# Patient Record
Sex: MALE | Race: WHITE | Employment: OTHER | RURAL
[De-identification: names, ages, dates, MRNs, and addresses within clinical notes are randomized per-mention and may not be internally consistent; named-entity substitution may affect disease eponyms.]

---

## 2017-01-31 ENCOUNTER — OFFICE VISIT (OUTPATIENT)
Dept: FAMILY MEDICINE CLINIC | Age: 62
End: 2017-01-31

## 2017-01-31 VITALS
DIASTOLIC BLOOD PRESSURE: 90 MMHG | TEMPERATURE: 98.2 F | HEART RATE: 88 BPM | RESPIRATION RATE: 22 BRPM | HEIGHT: 73 IN | BODY MASS INDEX: 41.75 KG/M2 | SYSTOLIC BLOOD PRESSURE: 149 MMHG | WEIGHT: 315 LBS | OXYGEN SATURATION: 91 %

## 2017-01-31 DIAGNOSIS — J01.41 ACUTE RECURRENT PANSINUSITIS: ICD-10-CM

## 2017-01-31 DIAGNOSIS — E78.5 HYPERLIPIDEMIA, UNSPECIFIED HYPERLIPIDEMIA TYPE: ICD-10-CM

## 2017-01-31 DIAGNOSIS — J02.9 PHARYNGITIS, UNSPECIFIED ETIOLOGY: ICD-10-CM

## 2017-01-31 DIAGNOSIS — I10 ESSENTIAL HYPERTENSION: ICD-10-CM

## 2017-01-31 DIAGNOSIS — E66.01 MORBID OBESITY DUE TO EXCESS CALORIES (HCC): ICD-10-CM

## 2017-01-31 DIAGNOSIS — R05.9 COUGH: ICD-10-CM

## 2017-01-31 RX ORDER — CODEINE PHOSPHATE AND GUAIFENESIN 10; 100 MG/5ML; MG/5ML
5 SOLUTION ORAL
Qty: 180 ML | Refills: 0 | Status: SHIPPED | OUTPATIENT
Start: 2017-01-31 | End: 2017-10-27

## 2017-01-31 RX ORDER — VERAPAMIL HYDROCHLORIDE 180 MG/1
180 TABLET, EXTENDED RELEASE ORAL
Qty: 90 TAB | Refills: 1 | Status: SHIPPED | OUTPATIENT
Start: 2017-01-31 | End: 2017-04-20 | Stop reason: SDUPTHER

## 2017-01-31 RX ORDER — AMOXICILLIN AND CLAVULANATE POTASSIUM 875; 125 MG/1; MG/1
1 TABLET, FILM COATED ORAL 2 TIMES DAILY
Qty: 20 TAB | Refills: 0 | Status: SHIPPED | OUTPATIENT
Start: 2017-01-31 | End: 2017-02-10

## 2017-01-31 RX ORDER — FLUTICASONE PROPIONATE 50 MCG
2 SPRAY, SUSPENSION (ML) NASAL DAILY
Qty: 1 BOTTLE | Refills: 3 | Status: SHIPPED | OUTPATIENT
Start: 2017-01-31 | End: 2018-01-25

## 2017-01-31 RX ORDER — METOPROLOL TARTRATE 50 MG/1
50 TABLET ORAL 2 TIMES DAILY
Qty: 180 TAB | Refills: 2 | Status: SHIPPED | OUTPATIENT
Start: 2017-01-31 | End: 2018-02-27 | Stop reason: SDUPTHER

## 2017-01-31 RX ORDER — LISINOPRIL AND HYDROCHLOROTHIAZIDE 20; 25 MG/1; MG/1
1 TABLET ORAL DAILY
Qty: 90 TAB | Refills: 1 | Status: SHIPPED | OUTPATIENT
Start: 2017-01-31 | End: 2017-10-27 | Stop reason: SDUPTHER

## 2017-01-31 RX ORDER — METFORMIN HYDROCHLORIDE 1000 MG/1
1000 TABLET ORAL 2 TIMES DAILY WITH MEALS
Qty: 180 TAB | Refills: 3 | Status: SHIPPED | OUTPATIENT
Start: 2017-01-31 | End: 2018-05-22 | Stop reason: SDUPTHER

## 2017-01-31 NOTE — PROGRESS NOTES
Jovanny Traore is an 63 y.o. male who presents with chief concern of IDDM, sick visit. URI:  For 1 month, he has had productive yellow cough with blood tinged. Bi-modal in nature after improvement 2 weeks ago. He admits pleurisy with cough, on occasion.   + Fevers (subjective with +chills), +nasal congestion, pain and sinus pressure. Doneta Citlaly Nyquil with some relief. Wife is also sick. Diabetes:  Last seen 1 year ago, he has uncontrolled IDDM. He states he takes all of his medications, but one of his BP medications is . The refills were for daily x 180 tabswith 2 refills. This may have  prior to completion due to large volume of medications. No denies any mention of hypoglycemia or symptoms. He needs HCTZ/lisinopril, metoprolol, metformin, verapamil. Diet: Little of everything, Eats fried foods. Admits some baked foods. Fruits and vegetables (1-2 per day) . Exercise: Work as a , welding. No Regular exercise. Detailed ROS below. Review of Systems, positives are bolded, strike through if denied. GEN:    /Fever/Chills/CV:      Pulm:    Cough/ExpectorationAbd/GI:   Psych:    Neuro:     ENT:    Sinus pain,   Endocrine:     :      MSK:      Skin:    Lower Ext:           Current and past medical information:  I personally reviewed and included updated list below.     Past Medical History   Diagnosis Date    Hypercholesterolemia     Hypertension        Allergies   Allergen Reactions    Cyclobenzaprine Other (comments)     Leg cramps    Percocet [Oxycodone-Acetaminophen] Nausea Only       Past Surgical History   Procedure Laterality Date    Hx orthopaedic  2004     rt shoulder surgery    Hx orthopaedic       fx lft arm       Social History     Social History    Marital status:      Spouse name: N/A    Number of children: N/A    Years of education: N/A     Social History Main Topics    Smoking status: Former Smoker     Quit date: 1992    Smokeless tobacco: Never Used    Alcohol use No    Drug use: No    Sexual activity: Yes     Partners: Female     Other Topics Concern    None     Social History Narrative           Physical Exam, Abnormal/pertinent findings bolded,     Visit Vitals    /90    Pulse 88    Temp 98.2 °F (36.8 °C) (Oral)    Resp 22    Ht 6' 1\" (1.854 m)    Wt 317 lb (143.8 kg)    SpO2 91%    BMI 41.82 kg/m2          GEN:    Alert and Oriented, No acute distress  Psych:  Mood appropriate, No pressured speech, linear thoughts  CV:    Regular Rhythm, S1 and S2 audible, no MRG, no palpable thrills  Pulm:    CTA B/L, no wheezes/rubs, Cough with deep inspiration. GI/Abd:   Non-tender to palpation, normal bowel sounds x4, no masses, (-) involuntary or voluntary guarding  Neuro:   Lucid, No focal deficits  ENT:    Tender and boggy frontal and maxillary sinuses, nasal turbinates inflamed with drainage. Throat erythematous, voice hoarse, TM mildly retracted and hyperemic, injected bilaterally. EOMI, Non-icteric sclera, MMM  Neck:   Trachea midline, no lymphadenopathy  :    No suprapubic tenderness  MSK:  Normal gait, FROM in all four extremities  Skin:   No visible Rash, Ecchymosis, or Excoriations  Lower Ext: No edema, No tenderness to palpation, No palpable cords        Assessment/PLAN    1. Uncontrolled type 2 diabetes mellitus without complication, with long-term current use of insulin ([address]  - he is taking his NPH over the counter BID 23 units without checking glucose. He denies any lows. His last A1c was >9 but he has not been seen in ~1 year. He endorses taking his medications as prescribed but due to self pay has not been able to make visits. He is not on any diet and is active at work but not on a routine exercise plan.   These were discussed in detail.   - CBC W/O DIFF  - METABOLIC PANEL, COMPREHENSIVE  - HEMOGLOBIN A1C WITH EAG  - LIPID PANEL  - TSH 3RD GENERATION  - MICROALBUMIN, UR, RAND W/ MICROALBUMIN/CREA RATIO  - metFORMIN (GLUCOPHAGE) 1,000 mg tablet; Take 1 Tab by mouth two (2) times daily (with meals). Dispense: 180 Tab; Refill: 3    2. Acute recurrent pansinusitis  - Bi-modal improvement and worsening. Fevers and chills with jaw pain. Considering his diabetes with treat with Abx.   - fluticasone (FLONASE) 50 mcg/actuation nasal spray; 2 Sprays by Both Nostrils route daily. Dispense: 1 Bottle; Refill: 3  - amoxicillin-clavulanate (AUGMENTIN) 875-125 mg per tablet; Take 1 Tab by mouth two (2) times a day for 10 days. Dispense: 20 Tab; Refill: 0    3. Morbid obesity due to excess calories (HCC)  Body mass index is 41.82 kg/(m^2). Discussion of diet and exercise. 4. Essential hypertension  Not at goal, refill his medications. Goal <140/90.  - verapamil ER (CALAN-SR) 180 mg CR tablet; Take 1 Tab by mouth nightly. Dispense: 90 Tab; Refill: 1  - lisinopril-hydroCHLOROthiazide (PRINZIDE, ZESTORETIC) 20-25 mg per tablet; Take 1 Tab by mouth daily. Dispense: 90 Tab; Refill: 1  - metoprolol tartrate (LOPRESSOR) 50 mg tablet; Take 1 Tab by mouth two (2) times a day. Dispense: 180 Tab; Refill: 2    5. Hyperlipidemia, unspecified hyperlipidemia type  - Follow up lipid panel. 6. Pharyngitis, unspecified etiology  - URI and sinusitis likely multi factorial of post nasal, sinus infection.   - guaiFENesin-codeine (ROBITUSSIN AC) 100-10 mg/5 mL solution; Take 5 mL by mouth three (3) times daily as needed for Cough. Max Daily Amount: 15 mL. Indications: COUGH  Dispense: 180 mL; Refill: 0    7. Cough  As above.   - guaiFENesin-codeine (ROBITUSSIN AC) 100-10 mg/5 mL solution; Take 5 mL by mouth three (3) times daily as needed for Cough. Max Daily Amount: 15 mL. Indications: COUGH  Dispense: 180 mL; Refill: 0    8. Uncontrolled type 2 diabetes mellitus with other specified complication, with long-term current use of insulin ([address]  As above. - metFORMIN (GLUCOPHAGE) 1,000 mg tablet;  Take 1 Tab by mouth two (2) times daily (with meals). Dispense: 180 Tab; Refill: 3      Body mass index is 41.82 kg/(m^2). I have reviewed/discussed the above normal BMI with the patient and spouse. I have recommended the following interventions: lifestyle education regarding diet, nutrition/feeding management and prescribed activity/exercise education . The plan is as follows: I have counseled this patient on diet and exercise regimens. .      Follow-up Disposition:  Return in about 3 months (around 4/30/2017). For next visit follow up diabetes and weight management    Plan of care:  Discussed diagnoses in detail with patient. Medication risks/benefits/side effects discussed with patient. All of the patient's questions were addressed. The patient understands and agrees with our plan of care. The patient knows to call back if they are unsure of or forget any changes we discussed today or if the symptoms change. The patient received an After-Visit Summary which contains VS, orders, medication list and allergy list. This can be used as a \"mini-medical record\" should they have to seek medical care while out of town. Warden Quinn DO  Resident note, PGY-3  Patient discussed with Precept Physician, Rae Burleson MD    No future appointments. Current Medications after this visit[de-identified]       Current Outpatient Prescriptions   Medication Sig    fluticasone (FLONASE) 50 mcg/actuation nasal spray 2 Sprays by Both Nostrils route daily.  amoxicillin-clavulanate (AUGMENTIN) 875-125 mg per tablet Take 1 Tab by mouth two (2) times a day for 10 days.  guaiFENesin-codeine (ROBITUSSIN AC) 100-10 mg/5 mL solution Take 5 mL by mouth three (3) times daily as needed for Cough. Max Daily Amount: 15 mL. Indications: COUGH    verapamil ER (CALAN-SR) 180 mg CR tablet Take 1 Tab by mouth nightly.  lisinopril-hydroCHLOROthiazide (PRINZIDE, ZESTORETIC) 20-25 mg per tablet Take 1 Tab by mouth daily.     metFORMIN (GLUCOPHAGE) 1,000 mg tablet Take 1 Tab by mouth two (2) times daily (with meals).  metoprolol tartrate (LOPRESSOR) 50 mg tablet Take 1 Tab by mouth two (2) times a day.  pravastatin (PRAVACHOL) 40 mg tablet Take 1 Tab by mouth nightly.  insulin NPH (NOVOLIN N, HUMULIN N) 100 unit/mL injection Inject 23 units twice a day for DM-2 (250.00)    insulin syringe-needle U-100 Dispense microfine 1 mL syringes with 30 gauge needle. Inject 23 units NPH twice a day for DM-2 (250.00)    Insulin Needles, Disposable, 31 X 5/16 \" ndle Inject hs for DM-2 250.00    glucose blood VI test strips (BLOOD GLUCOSE TEST) strip Test TID for DM-2 treated with insulin. (Patient taking differently: Test once a day for DM-2 treated with insulin.)    Lancets misc Test TID for DM-2 treated with insulin (250.00)    triamcinolone (KENALOG) 0.1 % lotion Apply  to affected area two (2) times daily as needed for Skin Irritation or Itching. use thin layer    omega-3 fatty acids-vitamin e (FISH OIL) 1,000 mg cap Take 2 Caps by mouth two (2) times a day.  nicotinic acid (NIACIN) 500 mg tablet Take 1 Tab by mouth Daily (before breakfast).  aspirin (ASPIRIN) 325 mg tablet Take 1 Tab by mouth daily. No current facility-administered medications for this visit.

## 2017-01-31 NOTE — PATIENT INSTRUCTIONS

## 2017-01-31 NOTE — MR AVS SNAPSHOT
Visit Information Date & Time Provider Department Dept. Phone Encounter #  
 1/31/2017  9:30 AM Rikki Galindo, [address] [phone] [id number] Upcoming Health Maintenance Date Due Pneumococcal 19-64 Medium Risk (1 of 1 - PPSV23) 6/7/1976 DTaP/Tdap/Td series (1 - Tdap) 1/22/2011 FOBT Q 1 YEAR AGE 50-75 9/26/2015 FOOT EXAM Q1 6/24/2016 HEMOGLOBIN A1C Q6M 8/19/2016 MICROALBUMIN Q1 2/19/2017 LIPID PANEL Q1 2/19/2017 EYE EXAM RETINAL OR DILATED Q1 12/28/2017 Allergies as of 1/31/2017  Review Complete On: 1/31/2017 By: Dipak Rosa Severity Noted Reaction Type Reactions Cyclobenzaprine  09/26/2014   Side Effect Other (comments) Leg cramps Percocet [Oxycodone-acetaminophen]  02/25/2010    Nausea Only Current Immunizations  Reviewed on 12/22/2010 Name Date Influenza Vaccine Split 12/22/2010 TD Vaccine 1/21/2011 Not reviewed this visit You Were Diagnosed With   
  
 Codes Comments Uncontrolled type 2 diabetes mellitus without complication, with long-term current use of insulin ([facility]    -  Primary ICD-10-CM: E11.65, Z79.4 ICD-9-CM: 250.02, V58.67 Acute recurrent pansinusitis     ICD-10-CM: J01.41 
ICD-9-CM: 461.8 Morbid obesity due to excess calories (HCC)     ICD-10-CM: E66.01 
ICD-9-CM: 278.01 Essential hypertension     ICD-10-CM: I10 
ICD-9-CM: 401.9 Hyperlipidemia, unspecified hyperlipidemia type     ICD-10-CM: E78.5 ICD-9-CM: 272.4 Pharyngitis, unspecified etiology     ICD-10-CM: J02.9 ICD-9-CM: 423 Cough     ICD-10-CM: R05 ICD-9-CM: 786.2 Uncontrolled type 2 diabetes mellitus with other specified complication, with long-term current use of insulin (HCC)     ICD-10-CM: E11.69, E11.65, Z79.4 Vitals BP Pulse Temp Resp Height(growth percentile) Weight(growth percentile) 149/90 88 98.2 °F (36.8 °C) (Oral) 22 6' 1\" (1.854 m) 317 lb (143.8 kg) SpO2 BMI Smoking Status 91% 41.82 kg/m2 Former Smoker Vitals History BMI and BSA Data Body Mass Index Body Surface Area  
 41.82 kg/m 2 2.72 m 2 Preferred Pharmacy Pharmacy Name Phone [facility] [address] [address] [phone] Your Updated Medication List  
  
   
This list is accurate as of: 1/31/17 10:15 AM.  Always use your most recent med list.  
  
  
  
  
 amoxicillin-clavulanate 875-125 mg per tablet Commonly known as:  AUGMENTIN Take 1 Tab by mouth two (2) times a day for 10 days. aspirin 325 mg tablet Commonly known as:  ASPIRIN Take 1 Tab by mouth daily. fluticasone 50 mcg/actuation nasal spray Commonly known as:  [place] 2 Sprays by Both Nostrils route daily. glucose blood VI test strips strip Commonly known as:  blood glucose test  
Test TID for DM-2 treated with insulin. guaiFENesin-codeine 100-10 mg/5 mL solution Commonly known as:  ROBITUSSIN AC Take 5 mL by mouth three (3) times daily as needed for Cough. Max Daily Amount: 15 mL. Indications: COUGH Insulin Needles (Disposable) 31 gauge x 5/16\" Ndle Inject hs for DM-2 250.00  
  
 insulin  unit/mL injection Commonly known as:  Nanine Able Inject 23 units twice a day for DM-2 (250.00) insulin syringe-needle U-100 Dispense microfine 1 mL syringes with 30 gauge needle. Inject 23 units NPH twice a day for DM-2 (250.00) Lancets Misc Test TID for DM-2 treated with insulin (250.00)  
  
 lisinopril-hydroCHLOROthiazide 20-25 mg per tablet Commonly known as:  Tim Jimenez Take 1 Tab by mouth daily. metFORMIN 1,000 mg tablet Commonly known as:  GLUCOPHAGE Take 1 Tab by mouth two (2) times daily (with meals). metoprolol tartrate 50 mg tablet Commonly known as:  LOPRESSOR Take 1 Tab by mouth two (2) times a day. nicotinic acid 500 mg tablet Commonly known as:  NIACIN Take 1 Tab by mouth Daily (before breakfast). omega-3 fatty acids-vitamin e 1,000 mg Cap Commonly known as:  FISH OIL Take 2 Caps by mouth two (2) times a day. pravastatin 40 mg tablet Commonly known as:  PRAVACHOL Take 1 Tab by mouth nightly. triamcinolone 0.1 % lotion Commonly known as:  KENALOG Apply  to affected area two (2) times daily as needed for Skin Irritation or Itching. use thin layer  
  
 verapamil  mg CR tablet Commonly known as:  CALAN-SR Take 1 Tab by mouth nightly. Prescriptions Printed Refills  
 guaiFENesin-codeine (ROBITUSSIN AC) 100-10 mg/5 mL solution 0 Sig: Take 5 mL by mouth three (3) times daily as needed for Cough. Max Daily Amount: 15 mL. Indications: COUGH Class: Print Route: Oral  
  
Prescriptions Sent to Pharmacy Refills  
 fluticasone (FLONASE) 50 mcg/actuation nasal spray 3 Si Sprays by Both Nostrils route daily. Class: Normal  
 Pharmacy: [place] [address] Ph #: [phone] Route: Both Nostrils  
 amoxicillin-clavulanate (AUGMENTIN) 875-125 mg per tablet 0 Sig: Take 1 Tab by mouth two (2) times a day for 10 days. Class: Normal  
 Pharmacy: [place] [address] Ph #: [phone] Route: Oral  
 verapamil ER (CALAN-SR) 180 mg CR tablet 1 Sig: Take 1 Tab by mouth nightly. Class: Normal  
 Pharmacy: [place] [address] Ph #: [phone] Route: Oral  
 lisinopril-hydroCHLOROthiazide (PRINZIDE, ZESTORETIC) 20-25 mg per tablet 1 Sig: Take 1 Tab by mouth daily. Class: Normal  
 Pharmacy: [place] [address] Ph #: [phone] Route: Oral  
 metFORMIN (GLUCOPHAGE) 1,000 mg tablet 3 Sig: Take 1 Tab by mouth two (2) times daily (with meals).   
 Class: Normal  
 Pharmacy: [id number] [place] [address] Ph #: [phone] Route: Oral  
 metoprolol tartrate (LOPRESSOR) 50 mg tablet 2 Sig: Take 1 Tab by mouth two (2) times a day. Class: Normal  
 Pharmacy: [id number] [place] [address] Ph #: [phone] Route: Oral  
  
We Performed the Following CBC W/O DIFF [29251 CPT(R)] HEMOGLOBIN A1C WITH EAG [23851 CPT(R)] LIPID PANEL [88253 CPT(R)] METABOLIC PANEL, COMPREHENSIVE [51275 CPT(R)] MICROALBUMIN, UR, RAND W/ MICROALBUMIN/CREA RATIO [id number] CPT(R)] TSH 3RD GENERATION [14969 CPT(R)] Patient Instructions Nutrition Tips for Diabetes: After Your Visit Your Care Instructions A healthy diet is important to manage diabetes. It helps you lose weight (if you need to) and keep it off. It gives you the nutrition and energy your body needs and helps prevent heart disease. But a diet for diabetes does not mean that you have to eat special foods. You can eat what your family eats, including occasional sweets and other favorites. But you do have to pay attention to how often you eat and how much you eat of certain foods. The right plan for you will give you meals that help you keep your blood sugar at healthy levels. Try to eat a variety of foods and to spread carbohydrate throughout the day. Carbohydrate raises blood sugar higher and more quickly than any other nutrient does. Carbohydrate is found in sugar, breads and cereals, fruit, starchy vegetables such as potatoes and corn, and milk and yogurt. You may want to work with a dietitian or diabetes educator to help you plan meals and snacks. A dietitian or diabetes educator also can help you lose weight if that is one of your goals. The following tips can help you enjoy your meals and stay healthy. Follow-up care is a key part of your treatment and safety.  Be sure to make and go to all appointments, and call your doctor if you are having problems. Its also a good idea to know your test results and keep a list of the medicines you take. How can you care for yourself at home? · Learn which foods have carbohydrate and how much carbohydrate to eat. A dietitian or diabetes educator can help you learn to keep track of how much carbohydrate you eat. · Spread carbohydrate throughout the day. Eat some carbohydrate at all meals, but do not eat too much at any one time. · Plan meals to include food from all the food groups. These are the food groups and some example portion sizes: ¨ Grains: 1 slice of bread (1 ounce), ½ cup of cooked cereal, and 1/3 cup of cooked pasta or rice. These have about 15 grams of carbohydrate in a serving. Choose whole grains such as whole wheat bread or crackers, oatmeal, and brown rice more often than refined grains. ¨ Fruit: 1 small fresh fruit, such as an apple or orange; ½ of a banana; ½ cup of chopped, cooked, or canned fruit; ½ cup of fruit juice; 1 cup of melon or raspberries; and 2 tablespoons of dried fruit. These have about 15 grams of carbohydrate in a serving. ¨ Dairy: 1 cup of nonfat or low-fat milk and 2/3 cup of plain yogurt. These have about 15 grams of carbohydrate in a serving. ¨ Protein foods: Beef, chicken, turkey, fish, eggs, tofu, cheese, cottage cheese, and peanut butter. A serving size of meat is 3 ounces, which is about the size of a deck of cards. Examples of meat substitute serving sizes (equal to 1 ounce of meat) are 1/4 cup of cottage cheese, 1 egg, 1 tablespoon of peanut butter, and ½ cup of tofu. These have very little or no carbohydrate per serving. ¨ Vegetables: Starchy vegetables such as ½ cup of cooked dried beans, peas, potatoes, or corn have about 15 grams of carbohydrate.  Nonstarchy vegetables have very little carbohydrate, such as 1 cup of raw leafy vegetables (such as spinach), ½ cup of other vegetables (cooked or chopped), and 3/4 cup of vegetable juice. · Use the plate format to plan meals. It is a good, quick way to make sure that you have a balanced meal. It also helps you spread carbohydrate throughout the day. You divide your plate by types of foods. Put vegetables on half the plate, meat or meat substitutes on one-quarter of the plate, and a grain or starchy vegetable (such as brown rice or a potato) in the final quarter of the plate. To this you can add a small piece of fruit and 1 cup of milk or yogurt, depending on how much carbohydrate you are supposed to eat at a meal. 
· Talk to your dietitian or diabetes educator about ways to add limited amounts of sweets into your meal plan. You can eat these foods now and then, as long as you include the amount of carbohydrate they have in your daily carbohydrate allowance. · If you drink alcohol, limit it to no more than 1 drink a day for women and 2 drinks a day for men. If you are pregnant, no amount of alcohol is known to be safe. · Protein, fat, and fiber do not raise blood sugar as much as carbohydrate does. If you eat a lot of these nutrients in a meal, your blood sugar will rise more slowly than it would otherwise. · Limit saturated fats, such as those from meat and dairy products. Try to replace it with monounsaturated fat, such as olive oil. This is a healthier choice because people who have diabetes are at higher-than-average risk of heart disease. But use a modest amount of olive oil. A tablespoon of olive oil has 14 grams of fat and 120 calories. · Exercise lowers blood sugar. If you take insulin by shots or pump, you can use less than you would if you were not exercising. Keep in mind that timing matters. If you exercise within 1 hour after a meal, your body may need less insulin for that meal than it would if you exercised 3 hours after the meal. Test your blood sugar to find out how exercise affects your need for insulin. · Exercise on most days of the week. Aim for at least 30 minutes. Exercise helps you stay at a healthy weight and helps your body use insulin. Walking is an easy way to get exercise. Gradually increase the amount you walk every day. You also may want to swim, bike, or do other activities. When you eat out · Learn to estimate the serving sizes of foods that have carbohydrate. If you measure food at home, it will be easier to estimate the amount in a serving of restaurant food. · If the meal you order has too much carbohydrate (such as potatoes, corn, or baked beans), ask to have a low-carbohydrate food instead. Ask for a salad or green vegetables. · If you use insulin, check your blood sugar before and after eating out to help you plan how much to eat in the future. · If you eat more carbohydrate at a meal than you had planned, take a walk or do other exercise. This will help lower your blood sugar. Where can you learn more? Go to [employer] Enter O654 in the search box to learn more about \"Nutrition Tips for Diabetes: After Your Visit. \"  
© 6585-1849 [employer]wise, Incorporated. Care instructions adapted under license by Riky Hirsch (which disclaims liability or warranty for this information). This care instruction is for use with your licensed healthcare professional. If you have questions about a medical condition or this instruction, always ask your healthcare professional. [address] any warranty or liability for your use of this information. Content Version: 02.5.622191; Current as of: June 4, 2014 Introducing [address]! Riky Hirsch introduces [email or web address] patient portal. Now you can access parts of your medical record, email your doctor's office, and request medication refills online. 1. In your internet browser, go to [email or web address] 2. Click on the First Time User? Click Here link in the Sign In box.  You will see the New Member Sign Up page. 3. Enter your [employer] Access Code exactly as it appears below. You will not need to use this code after youve completed the sign-up process. If you do not sign up before the expiration date, you must request a new code. · [employer] Access Code: [email or web address] Expires: 5/1/2017  9:13 AM 
 
4. Enter the last four digits of your Social Security Number (xxxx) and Date of Birth (mm/dd/yyyy) as indicated and click Submit. You will be taken to the next sign-up page. 5. Create a [employer] ID. This will be your [employer] login ID and cannot be changed, so think of one that is secure and easy to remember. 6. Create a [employer] password. You can change your password at any time. 7. Enter your Password Reset Question and Answer. This can be used at a later time if you forget your password. 8. Enter your e-mail address. You will receive e-mail notification when new information is available in [address]. 9. Click Sign Up. You can now view and download portions of your medical record. 10. Click the Download Summary menu link to download a portable copy of your medical information. If you have questions, please visit the Frequently Asked Questions section of the [employer] website. Remember, [employer] is NOT to be used for urgent needs. For medical emergencies, dial 911. Now available from your iPhone and Android! Please provide this summary of care documentation to your next provider. Your primary care clinician is listed as Cayla Pena. If you have any questions after today's visit, please call [phone].

## 2017-01-31 NOTE — PROGRESS NOTES
Reviewed record in preparation for visit and have necessary documentation    Body mass index is 41.82 kg/(m^2).     Health Maintenance Due   Topic Date Due    Pneumococcal 19-64 Medium Risk (1 of 1 - PPSV23) 06/07/1976    DTaP/Tdap/Td series (1 - Tdap) 01/22/2011    FOBT Q 1 YEAR AGE 50-75  09/26/2015    FOOT EXAM Q1  06/24/2016    HEMOGLOBIN A1C Q6M  08/19/2016    MICROALBUMIN Q1  02/19/2017    LIPID PANEL Q1  02/19/2017

## 2017-02-01 LAB
ALBUMIN SERPL-MCNC: 3.8 G/DL (ref 3.5–5.5)
ALBUMIN/CREAT UR: 78.6 MG/G CREAT (ref 0–30)
ALBUMIN/GLOB SERPL: 1.2 {RATIO} (ref 1.1–2.5)
ALP SERPL-CCNC: 86 IU/L (ref 39–117)
ALT SERPL-CCNC: 27 IU/L (ref 0–44)
AST SERPL-CCNC: 22 IU/L (ref 0–40)
BILIRUB SERPL-MCNC: 0.2 MG/DL (ref 0–1.2)
BUN SERPL-MCNC: 14 MG/DL (ref 6–24)
BUN/CREAT SERPL: 14 (ref 9–20)
CALCIUM SERPL-MCNC: 9.9 MG/DL (ref 8.7–10.2)
CHLORIDE SERPL-SCNC: 98 MMOL/L (ref 96–106)
CHOLEST SERPL-MCNC: 163 MG/DL (ref 100–199)
CO2 SERPL-SCNC: 23 MMOL/L (ref 18–29)
CREAT SERPL-MCNC: 1 MG/DL (ref 0.76–1.27)
CREAT UR-MCNC: 193.7 MG/DL
ERYTHROCYTE [DISTWIDTH] IN BLOOD BY AUTOMATED COUNT: 13.4 % (ref 12.3–15.4)
EST. AVERAGE GLUCOSE BLD GHB EST-MCNC: 286 MG/DL
GLOBULIN SER CALC-MCNC: 3.1 G/DL (ref 1.5–4.5)
GLUCOSE SERPL-MCNC: 222 MG/DL (ref 65–99)
HBA1C MFR BLD: 11.6 % (ref 4.8–5.6)
HCT VFR BLD AUTO: 45.8 % (ref 37.5–51)
HDLC SERPL-MCNC: 41 MG/DL
HGB BLD-MCNC: 15.6 G/DL (ref 12.6–17.7)
LDLC SERPL CALC-MCNC: 94 MG/DL (ref 0–99)
MCH RBC QN AUTO: 30.4 PG (ref 26.6–33)
MCHC RBC AUTO-ENTMCNC: 34.1 G/DL (ref 31.5–35.7)
MCV RBC AUTO: 89 FL (ref 79–97)
MICROALBUMIN UR-MCNC: 152.2 UG/ML
PLATELET # BLD AUTO: 387 X10E3/UL (ref 150–379)
POTASSIUM SERPL-SCNC: 4.3 MMOL/L (ref 3.5–5.2)
PROT SERPL-MCNC: 6.9 G/DL (ref 6–8.5)
RBC # BLD AUTO: 5.14 X10E6/UL (ref 4.14–5.8)
SODIUM SERPL-SCNC: 138 MMOL/L (ref 134–144)
TRIGL SERPL-MCNC: 142 MG/DL (ref 0–149)
TSH SERPL DL<=0.005 MIU/L-ACNC: 0.58 UIU/ML (ref 0.45–4.5)
VLDLC SERPL CALC-MCNC: 28 MG/DL (ref 5–40)
WBC # BLD AUTO: 11.1 X10E3/UL (ref 3.4–10.8)

## 2017-02-07 NOTE — PROGRESS NOTES
CBC stable  CMP without renal or liver damage, elevated fasting glucose. Hemoglobin A1c worse but stable for him. Taking 23 units NPH over the counter BID, not checking POC glucoses. Will advise to increase to 30 mg BID. Microalbumin showing signs of early kidney disease. Lipid panel is stable. TSH stable.

## 2017-04-20 DIAGNOSIS — I10 ESSENTIAL HYPERTENSION: ICD-10-CM

## 2017-04-21 RX ORDER — VERAPAMIL HYDROCHLORIDE 180 MG/1
180 TABLET, EXTENDED RELEASE ORAL
Qty: 90 TAB | Refills: 1 | Status: SHIPPED | OUTPATIENT
Start: 2017-04-21 | End: 2018-01-25 | Stop reason: SDUPTHER

## 2017-10-18 DIAGNOSIS — I10 ESSENTIAL HYPERTENSION: ICD-10-CM

## 2017-10-19 RX ORDER — LISINOPRIL AND HYDROCHLOROTHIAZIDE 20; 25 MG/1; MG/1
1 TABLET ORAL DAILY
Qty: 90 TAB | Refills: 1 | OUTPATIENT
Start: 2017-10-19

## 2017-10-27 ENCOUNTER — OFFICE VISIT (OUTPATIENT)
Dept: FAMILY MEDICINE CLINIC | Age: 62
End: 2017-10-27

## 2017-10-27 VITALS
HEIGHT: 73 IN | BODY MASS INDEX: 41.75 KG/M2 | TEMPERATURE: 97.7 F | RESPIRATION RATE: 20 BRPM | WEIGHT: 315 LBS | OXYGEN SATURATION: 92 % | HEART RATE: 76 BPM | DIASTOLIC BLOOD PRESSURE: 90 MMHG | SYSTOLIC BLOOD PRESSURE: 142 MMHG

## 2017-10-27 DIAGNOSIS — L08.9 SKIN INFECTION, BACTERIAL: Primary | ICD-10-CM

## 2017-10-27 DIAGNOSIS — B96.89 SKIN INFECTION, BACTERIAL: Primary | ICD-10-CM

## 2017-10-27 DIAGNOSIS — E78.5 HYPERLIPIDEMIA, UNSPECIFIED HYPERLIPIDEMIA TYPE: ICD-10-CM

## 2017-10-27 DIAGNOSIS — I10 ESSENTIAL HYPERTENSION: ICD-10-CM

## 2017-10-27 LAB — HBA1C MFR BLD HPLC: 9.9 %

## 2017-10-27 RX ORDER — CEPHALEXIN 500 MG/1
500 CAPSULE ORAL 4 TIMES DAILY
Qty: 40 CAP | Refills: 0 | Status: SHIPPED | OUTPATIENT
Start: 2017-10-27 | End: 2017-11-06

## 2017-10-27 RX ORDER — LISINOPRIL AND HYDROCHLOROTHIAZIDE 20; 25 MG/1; MG/1
1 TABLET ORAL DAILY
Qty: 90 TAB | Refills: 0 | Status: SHIPPED | OUTPATIENT
Start: 2017-10-27 | End: 2018-01-22 | Stop reason: SDUPTHER

## 2017-10-27 NOTE — MR AVS SNAPSHOT
Visit Information Date & Time Provider Department Dept. Phone Encounter #  
 10/27/2017  8:40 AM Diane Frank MD  [facility] [place] [id number] Upcoming Health Maintenance Date Due Pneumococcal 19-64 Medium Risk (1 of 1 - PPSV23) 6/7/1976 DTaP/Tdap/Td series (1 - Tdap) 1/22/2011 FOBT Q 1 YEAR AGE 50-75 9/26/2015 FOOT EXAM Q1 6/24/2016 ZOSTER VACCINE AGE 60> 4/7/2017 HEMOGLOBIN A1C Q6M 7/31/2017 EYE EXAM RETINAL OR DILATED Q1 12/28/2017 MICROALBUMIN Q1 1/31/2018 LIPID PANEL Q1 1/31/2018 Allergies as of 10/27/2017  Review Complete On: 10/27/2017 By: Jean-Paul Camarena Severity Noted Reaction Type Reactions Cyclobenzaprine  09/26/2014   Side Effect Other (comments) Leg cramps Percocet [Oxycodone-acetaminophen]  02/25/2010    Nausea Only Current Immunizations  Reviewed on 12/22/2010 Name Date Influenza Vaccine Split 12/22/2010 TD Vaccine 1/21/2011 Not reviewed this visit You Were Diagnosed With   
  
 Codes Comments Skin infection, bacterial    -  Primary ICD-10-CM: L08.9, B96.89 
ICD-9-CM: 686.9, 041.9 Uncontrolled type 2 diabetes mellitus without complication, with long-term current use of insulin (HCC)     ICD-10-CM: E11.65, Z79.4 ICD-9-CM: 250.02, V58.67 Essential hypertension     ICD-10-CM: I10 
ICD-9-CM: 401.9 Hyperlipidemia, unspecified hyperlipidemia type     ICD-10-CM: E78.5 ICD-9-CM: 272.4 Vitals BP Pulse Temp Resp Height(growth percentile) Weight(growth percentile) 142/90 (BP 1 Location: Right arm, BP Patient Position: Sitting) 76 97.7 °F (36.5 °C) (Oral) 20 6' 1\" (1.854 m) 325 lb (147.4 kg) SpO2 BMI Smoking Status 92% 42.88 kg/m2 Former Smoker Vitals History BMI and BSA Data Body Mass Index Body Surface Area  
 42.88 kg/m 2 2.76 m 2 Preferred Pharmacy Pharmacy Name Phone [facility] [address] [address] [phone] Your Updated Medication List  
  
   
This list is accurate as of: 10/27/17  9:36 AM.  Always use your most recent med list.  
  
  
  
  
 aspirin 325 mg tablet Commonly known as:  ASPIRIN Take 1 Tab by mouth daily. cephALEXin 500 mg capsule Commonly known as:  Addie Officer Take 1 Cap by mouth four (4) times daily for 10 days. fluticasone 50 mcg/actuation nasal spray Commonly known as:  Ginna Reges 2 Sprays by Both Nostrils route daily. glucose blood VI test strips strip Commonly known as:  blood glucose test  
Test TID for DM-2 treated with insulin. Insulin Needles (Disposable) 31 gauge x 5/16\" Ndle Inject hs for DM-2 250.00  
  
 insulin  unit/mL injection Commonly known as:  Bronwyn Gains Inject 23 units twice a day for DM-2 (250.00) insulin syringe-needle U-100 Dispense microfine 1 mL syringes with 30 gauge needle. Inject 23 units NPH twice a day for DM-2 (250.00) Lancets Misc Test TID for DM-2 treated with insulin (250.00)  
  
 lisinopril-hydroCHLOROthiazide 20-25 mg per tablet Commonly known as:  Katie Mcardle Take 1 Tab by mouth daily. metFORMIN 1,000 mg tablet Commonly known as:  GLUCOPHAGE Take 1 Tab by mouth two (2) times daily (with meals). metoprolol tartrate 50 mg tablet Commonly known as:  LOPRESSOR Take 1 Tab by mouth two (2) times a day. omega-3 fatty acids-vitamin e 1,000 mg Cap Commonly known as:  FISH OIL Take 2 Caps by mouth two (2) times a day. pravastatin 40 mg tablet Commonly known as:  PRAVACHOL Take 1 Tab by mouth nightly. triamcinolone 0.1 % lotion Commonly known as:  KENALOG Apply  to affected area two (2) times daily as needed for Skin Irritation or Itching. use thin layer  
  
 verapamil  mg CR tablet Commonly known as:  CALAN-SR Take 1 Tab by mouth nightly. Prescriptions Sent to Pharmacy Refills  
 lisinopril-hydroCHLOROthiazide (PRINZIDE, ZESTORETIC) 20-25 mg per tablet 0 Sig: Take 1 Tab by mouth daily. Class: Normal  
 Pharmacy: [place] [address] Ph #: [phone] Route: Oral  
 cephALEXin (KEFLEX) 500 mg capsule 0 Sig: Take 1 Cap by mouth four (4) times daily for 10 days. Class: Normal  
 Pharmacy: [place] [address] Ph #: [phone] Route: Oral  
  
We Performed the Following AMB POC HEMOGLOBIN A1C [31999 CPT(R)] LIPID PANEL [65311 CPT(R)] METABOLIC PANEL, COMPREHENSIVE [00389 CPT(R)] TSH 3RD GENERATION [54258 CPT(R)] Patient Instructions Diabetes Foot Health: Care Instructions Your Care Instructions When you have diabetes, your feet need extra care and attention. Diabetes can damage the nerve endings and blood vessels in your feet, making you less likely to notice when your feet are injured. Diabetes also limits your body's ability to fight infection and get blood to areas that need it. If you get a minor foot injury, it could become an ulcer or a serious infection. With good foot care, you can prevent most of these problems. Caring for your feet can be quick and easy. Most of the care can be done when you are bathing or getting ready for bed. Follow-up care is a key part of your treatment and safety. Be sure to make and go to all appointments, and call your doctor if you are having problems. It's also a good idea to know your test results and keep a list of the medicines you take. How can you care for yourself at home? · Keep your blood sugar close to normal by watching what and how much you eat, monitoring blood sugar, taking medicines if prescribed, and getting regular exercise. · Do not smoke.  Smoking affects blood flow and can make foot problems worse. If you need help quitting, talk to your doctor about stop-smoking programs and medicines. These can increase your chances of quitting for good. · Eat a diet that is low in fats. High fat intake can cause fat to build up in your blood vessels and decrease blood flow. · Inspect your feet daily for blisters, cuts, cracks, or sores. If you cannot see well, use a mirror or have someone help you. · Take care of your feet: 
[facility] your feet every day. Use warm (not hot) water. Check the water temperature with your wrists or other part of your body, not your feet. ¨ Dry your feet well. Pat them dry. Do not rub the skin on your feet too hard. Dry well between your toes. If the skin on your feet stays moist, bacteria or a fungus can grow, which can lead to infection. ¨ Keep your skin soft. Use moisturizing skin cream to keep the skin on your feet soft and prevent calluses and cracks. But do not put the cream between your toes, and stop using any cream that causes a rash. ¨ Clean underneath your toenails carefully. Do not use a sharp object to clean underneath your toenails. Use the blunt end of a nail file or other rounded tool. ¨ Trim and file your toenails straight across to prevent ingrown toenails. Use a nail clipper, not scissors. Use an emery board to smooth the edges. · Change socks daily. Socks without seams are best, because seams often rub the feet. You can find socks for people with diabetes from specialty catalogs. · Look inside your shoes every day for things like gravel or torn linings, which could cause blisters or sores. · Buy shoes that fit well: 
¨ Look for shoes that have plenty of space around the toes. This helps prevent bunions and blisters. ¨ Try on shoes while wearing the kind of socks you will usually wear with the shoes. ¨ Avoid plastic shoes. They may rub your feet and cause blisters.  Good shoes should be made of materials that are flexible and breathable, such as leather or cloth. ¨ Break in new shoes slowly by wearing them for no more than an hour a day for several days. Take extra time to check your feet for red areas, blisters, or other problems after you wear new shoes. · Do not go barefoot. Do not wear sandals, and do not wear shoes with very thin soles. Thin soles are easy to puncture. They also do not protect your feet from hot pavement or cold weather. · Have your doctor check your feet during each visit. If you have a foot problem, see your doctor. Do not try to treat an early foot problem at home. Home remedies or treatments that you can buy without a prescription (such as corn removers) can be harmful. · Always get early treatment for foot problems. A minor irritation can lead to a major problem if not properly cared for early. When should you call for help? Call your doctor now or seek immediate medical care if: 
? · You have a foot sore, an ulcer or break in the skin that is not healing after 4 days, bleeding corns or calluses, or an ingrown toenail. ? · You have blue or black areas, which can mean bruising or blood flow problems. ? · You have peeling skin or tiny blisters between your toes or cracking or oozing of the skin. ? · You have a fever for more than 24 hours and a foot sore. ? · You have new numbness or tingling in your feet that does not go away after you move your feet or change positions. ? · You have unexplained or unusual swelling of the foot or ankle. ? Watch closely for changes in your health, and be sure to contact your doctor if: 
? · You cannot do proper foot care. Where can you learn more? Go to http://live-carmen.info/. Enter A739 in the search box to learn more about \"Diabetes Foot Health: Care Instructions. \" Current as of: March 13, 2017 Content Version: 11.4 © 0554-1673 [employer].  Care instructions adapted under license by [address] (which disclaims liability or warranty for this information). If you have questions about a medical condition or this instruction, always ask your healthcare professional. [address] any warranty or liability for your use of this information. Introducing [facility]! [facility] introduces SupportLocal patient portal. Now you can access parts of your medical record, email your doctor's office, and request medication refills online. 1. In your internet browser, go to [email or web address] 2. Click on the First Time User? Click Here link in the Sign In box. You will see the New Member Sign Up page. 3. Enter your SupportLocal Access Code exactly as it appears below. You will not need to use this code after youve completed the sign-up process. If you do not sign up before the expiration date, you must request a new code. · SupportLocal Access Code: [email or web address] Expires: 1/25/2018  9:35 AM 
 
4. Enter the last four digits of your Social Security Number (xxxx) and Date of Birth (mm/dd/yyyy) as indicated and click Submit. You will be taken to the next sign-up page. 5. Create a SupportLocal ID. This will be your SupportLocal login ID and cannot be changed, so think of one that is secure and easy to remember. 6. Create a SupportLocal password. You can change your password at any time. 7. Enter your Password Reset Question and Answer. This can be used at a later time if you forget your password. 8. Enter your e-mail address. You will receive e-mail notification when new information is available in [address] 9. Click Sign Up. You can now view and download portions of your medical record. 10. Click the Download Summary menu link to download a portable copy of your medical information. If you have questions, please visit the Frequently Asked Questions section of the SupportLocal website.  Remember, SupportLocal is NOT to be used for urgent needs. For medical emergencies, dial 911. Now available from your iPhone and Android! Please provide this summary of care documentation to your next provider. If you have any questions after today's visit, please call [phone].

## 2017-10-27 NOTE — PATIENT INSTRUCTIONS
Diabetes Foot Health: Care Instructions  Your Care Instructions    When you have diabetes, your feet need extra care and attention. Diabetes can damage the nerve endings and blood vessels in your feet, making you less likely to notice when your feet are injured. Diabetes also limits your body's ability to fight infection and get blood to areas that need it. If you get a minor foot injury, it could become an ulcer or a serious infection. With good foot care, you can prevent most of these problems. Caring for your feet can be quick and easy. Most of the care can be done when you are bathing or getting ready for bed. Follow-up care is a key part of your treatment and safety. Be sure to make and go to all appointments, and call your doctor if you are having problems. It's also a good idea to know your test results and keep a list of the medicines you take. How can you care for yourself at home? · Keep your blood sugar close to normal by watching what and how much you eat, monitoring blood sugar, taking medicines if prescribed, and getting regular exercise. · Do not smoke. Smoking affects blood flow and can make foot problems worse. If you need help quitting, talk to your doctor about stop-smoking programs and medicines. These can increase your chances of quitting for good. · Eat a diet that is low in fats. High fat intake can cause fat to build up in your blood vessels and decrease blood flow. · Inspect your feet daily for blisters, cuts, cracks, or sores. If you cannot see well, use a mirror or have someone help you. · Take care of your feet:  [facility] your feet every day. Use warm (not hot) water. Check the water temperature with your wrists or other part of your body, not your feet. ¨ Dry your feet well. Pat them dry. Do not rub the skin on your feet too hard. Dry well between your toes. If the skin on your feet stays moist, bacteria or a fungus can grow, which can lead to infection.   ¨ Keep your skin soft. Use moisturizing skin cream to keep the skin on your feet soft and prevent calluses and cracks. But do not put the cream between your toes, and stop using any cream that causes a rash. ¨ Clean underneath your toenails carefully. Do not use a sharp object to clean underneath your toenails. Use the blunt end of a nail file or other rounded tool. ¨ Trim and file your toenails straight across to prevent ingrown toenails. Use a nail clipper, not scissors. Use an emery board to smooth the edges. · Change socks daily. Socks without seams are best, because seams often rub the feet. You can find socks for people with diabetes from specialty catalogs. · Look inside your shoes every day for things like gravel or torn linings, which could cause blisters or sores. · Buy shoes that fit well:  ¨ Look for shoes that have plenty of space around the toes. This helps prevent bunions and blisters. ¨ Try on shoes while wearing the kind of socks you will usually wear with the shoes. ¨ Avoid plastic shoes. They may rub your feet and cause blisters. Good shoes should be made of materials that are flexible and breathable, such as leather or cloth. ¨ Break in new shoes slowly by wearing them for no more than an hour a day for several days. Take extra time to check your feet for red areas, blisters, or other problems after you wear new shoes. · Do not go barefoot. Do not wear sandals, and do not wear shoes with very thin soles. Thin soles are easy to puncture. They also do not protect your feet from hot pavement or cold weather. · Have your doctor check your feet during each visit. If you have a foot problem, see your doctor. Do not try to treat an early foot problem at home. Home remedies or treatments that you can buy without a prescription (such as corn removers) can be harmful. · Always get early treatment for foot problems. A minor irritation can lead to a major problem if not properly cared for early.   When should you call for help? Call your doctor now or seek immediate medical care if:  ? · You have a foot sore, an ulcer or break in the skin that is not healing after 4 days, bleeding corns or calluses, or an ingrown toenail. ? · You have blue or black areas, which can mean bruising or blood flow problems. ? · You have peeling skin or tiny blisters between your toes or cracking or oozing of the skin. ? · You have a fever for more than 24 hours and a foot sore. ? · You have new numbness or tingling in your feet that does not go away after you move your feet or change positions. ? · You have unexplained or unusual swelling of the foot or ankle. ? Watch closely for changes in your health, and be sure to contact your doctor if:  ? · You cannot do proper foot care. Where can you learn more? Go to http://live-carmen.info/. Enter A739 in the search box to learn more about \"Diabetes Foot Health: Care Instructions. \"  Current as of: March 13, 2017  Content Version: 11.4  © 9112-9083 [employer]. Care instructions adapted under license by [employer] (which disclaims liability or warranty for this information). If you have questions about a medical condition or this instruction, always ask your healthcare professional. [address] any warranty or liability for your use of this information.

## 2017-10-27 NOTE — PROGRESS NOTES
Reviewed record in preparation for visit and have necessary documentation  Pt did not bring medication to office visit for review    Goals that were addressed and/or need to be completed during or after this appointment include   Health Maintenance Due   Topic Date Due    Pneumococcal 19-64 Medium Risk (1 of 1 - PPSV23) 06/07/1976    DTaP/Tdap/Td series (1 - Tdap) 01/22/2011    FOBT Q 1 YEAR AGE 50-75  09/26/2015    FOOT EXAM Q1  06/24/2016    ZOSTER VACCINE AGE 60>  04/07/2017    HEMOGLOBIN A1C Q6M  07/31/2017

## 2017-10-28 LAB
ALBUMIN SERPL-MCNC: 3.9 G/DL (ref 3.6–4.8)
ALBUMIN/GLOB SERPL: 1.4 {RATIO} (ref 1.2–2.2)
ALP SERPL-CCNC: 59 IU/L (ref 39–117)
ALT SERPL-CCNC: 35 IU/L (ref 0–44)
AST SERPL-CCNC: 17 IU/L (ref 0–40)
BILIRUB SERPL-MCNC: 0.3 MG/DL (ref 0–1.2)
BUN SERPL-MCNC: 17 MG/DL (ref 8–27)
BUN/CREAT SERPL: 17 (ref 10–24)
CALCIUM SERPL-MCNC: 9.5 MG/DL (ref 8.6–10.2)
CHLORIDE SERPL-SCNC: 100 MMOL/L (ref 96–106)
CHOLEST SERPL-MCNC: 176 MG/DL (ref 100–199)
CO2 SERPL-SCNC: 25 MMOL/L (ref 18–29)
CREAT SERPL-MCNC: 1.03 MG/DL (ref 0.76–1.27)
GFR SERPLBLD CREATININE-BSD FMLA CKD-EPI: 79 ML/MIN/1.73
GFR SERPLBLD CREATININE-BSD FMLA CKD-EPI: 91 ML/MIN/1.73
GLOBULIN SER CALC-MCNC: 2.7 G/DL (ref 1.5–4.5)
GLUCOSE SERPL-MCNC: 219 MG/DL (ref 65–99)
HDLC SERPL-MCNC: 44 MG/DL
LDLC SERPL CALC-MCNC: 101 MG/DL (ref 0–99)
POTASSIUM SERPL-SCNC: 4.8 MMOL/L (ref 3.5–5.2)
PROT SERPL-MCNC: 6.6 G/DL (ref 6–8.5)
SODIUM SERPL-SCNC: 141 MMOL/L (ref 134–144)
TRIGL SERPL-MCNC: 155 MG/DL (ref 0–149)
TSH SERPL DL<=0.005 MIU/L-ACNC: 0.58 UIU/ML (ref 0.45–4.5)
VLDLC SERPL CALC-MCNC: 31 MG/DL (ref 5–40)

## 2017-10-29 NOTE — PROGRESS NOTES
Patient: Angie Carter MRN: [id number]  SSN: [id number]    YOB: 1955  Age: 63 y.o. Sex: male        Subjective:     Chief Complaint   Patient presents with    Skin Problem     knot on right side of back       HPI: he is a 63y.o. year old male new to me who presents for painful lesion on right mid-back. Concerned about insect bite. Patient with hx of uncontrolled T2D, HLD, HTN and morbid obesity. Patient sans health insurance. He is past due for lab work. He does need medication refills. Patient denies HA, dizziness, SOB, CP, abdominal pain, dysuria, myalgias or arthralgias. Encounter Diagnoses   Name Primary?  Skin infection, bacterial Yes    Uncontrolled type 2 diabetes mellitus without complication, with long-term current use of insulin (HCC)     Essential hypertension     Hyperlipidemia, unspecified hyperlipidemia type      Diabetes: This patient is being treating under a comprehensive plan of care for diabetes. Overall the patient feels well with good energy level. Insulin dependence: no   Pertinent Labs:     Lab Results   Component Value Date/Time    Hemoglobin A1c 11.6 01/31/2017 10:20 AM    Hemoglobin A1c (POC) 9.9 10/27/2017 09:46 AM         Body mass index is 42.88 kg/(m^2). Lab Results   Component Value Date/Time    LDL, calculated 101 10/27/2017 12:39 PM           Wt Readings from Last 3 Encounters:   10/27/17 325 lb (147.4 kg)   01/31/17 317 lb (143.8 kg)   02/19/16 326 lb (147.9 kg)        History   Smoking Status    Former Smoker    Quit date: 6/1/1992   Smokeless Tobacco    Never Used        Medications, diet and exercise as means of diabetic control with a goal of an A1C of less than 7.0% discussed. Diabetic foot care and annual eye exam discussed as well. Check blood sugars while fasting just before breakfast on most days and occasionally before dinner. Write down readings in a diabetic log book and bring them to the next visit.     Call the office for fasting sugars over 200 or below 75 on two or more occasions. Call immediately if having symptoms of high sugar (frequent urination, always thirsty) or low sugar (dizzy, lethargic, sweaty, nauseated, headache). Our overall goal is to reduce or eliminate the long term consequences of poorly controlled diabetes. Patient expresses understanding and agreement with our plan of care. Hypertension:  The patient reports:  taking medications as instructed, no medication side effects noted, no TIA's, no chest pain on exertion, no dyspnea on exertion, no swelling of ankles. Lifestyle modification/social history: nonsmoker     BP Readings from Last 3 Encounters:   10/27/17 142/90   01/31/17 149/90   02/19/16 131/79     Lab Results   Component Value Date/Time    Sodium 141 10/27/2017 12:39 PM    Potassium 4.8 10/27/2017 12:39 PM    Chloride 100 10/27/2017 12:39 PM    CO2 25 10/27/2017 12:39 PM    Anion gap 11 06/28/2010 11:03 AM    Glucose 219 10/27/2017 12:39 PM    BUN 17 10/27/2017 12:39 PM    Creatinine 1.03 10/27/2017 12:39 PM    BUN/Creatinine ratio 17 10/27/2017 12:39 PM    GFR est AA 91 10/27/2017 12:39 PM    GFR est non-AA 79 10/27/2017 12:39 PM    Calcium 9.5 10/27/2017 12:39 PM     Patient advised to log blood pressures at home weekly and bring to next visit. Call office as soon as possible if BP's over 140/90 on multiple occasions or with symptoms of dizziness, chest pain, shortness of breath, headache or ankle swelling. Our goal is to normalize the blood pressure to decrease the risks of strokes and heart attacks. The patient is in agreement with the plan. Current and past medical information:    Current Medications after this visit[de-identified]     Current Outpatient Prescriptions   Medication Sig    lisinopril-hydroCHLOROthiazide (PRINZIDE, ZESTORETIC) 20-25 mg per tablet Take 1 Tab by mouth daily.  cephALEXin (KEFLEX) 500 mg capsule Take 1 Cap by mouth four (4) times daily for 10 days.     verapamil ER (CALAN-SR) 180 mg CR tablet Take 1 Tab by mouth nightly.  metFORMIN (GLUCOPHAGE) 1,000 mg tablet Take 1 Tab by mouth two (2) times daily (with meals).  metoprolol tartrate (LOPRESSOR) 50 mg tablet Take 1 Tab by mouth two (2) times a day.  pravastatin (PRAVACHOL) 40 mg tablet Take 1 Tab by mouth nightly.  insulin NPH (NOVOLIN N, HUMULIN N) 100 unit/mL injection Inject 23 units twice a day for DM-2 (250.00)    insulin syringe-needle U-100 Dispense microfine 1 mL syringes with 30 gauge needle. Inject 23 units NPH twice a day for DM-2 (250.00)    Insulin Needles, Disposable, 31 X 5/16 \" ndle Inject hs for DM-2 250.00    glucose blood VI test strips (BLOOD GLUCOSE TEST) strip Test TID for DM-2 treated with insulin. (Patient taking differently: Test once a day for DM-2 treated with insulin.)    Lancets misc Test TID for DM-2 treated with insulin (250.00)    triamcinolone (KENALOG) 0.1 % lotion Apply  to affected area two (2) times daily as needed for Skin Irritation or Itching. use thin layer    omega-3 fatty acids-vitamin e (FISH OIL) 1,000 mg cap Take 2 Caps by mouth two (2) times a day.  aspirin (ASPIRIN) 325 mg tablet Take 1 Tab by mouth daily.  fluticasone (FLONASE) 50 mcg/actuation nasal spray 2 Sprays by Both Nostrils route daily. No current facility-administered medications for this visit.         Patient Active Problem List    Diagnosis Date Noted    Diabetes type 2, uncontrolled ([facility] [address] 05/28/2014    Lumbar compression fracture ([facility] [address] 06/15/2011    HTN (hypertension) 06/18/2010    Hyperlipidemia 06/18/2010    Obesity 16/98/0746    Dysmetabolic syndrome X 74/93/0736       Past Medical History:   Diagnosis Date    Hypercholesterolemia     Hypertension        Allergies   Allergen Reactions    Cyclobenzaprine Other (comments)     Leg cramps    Percocet [Oxycodone-Acetaminophen] Nausea Only       Past Surgical History:   Procedure Laterality Date    HX ORTHOPAEDIC  2004    rt shoulder surgery    HX ORTHOPAEDIC      fx lft arm       Social History     Social History    Marital status:      Spouse name: N/A    Number of children: N/A    Years of education: N/A     Social History Main Topics    Smoking status: Former Smoker     Quit date: 6/1/1992    Smokeless tobacco: Never Used    Alcohol use No    Drug use: No    Sexual activity: Yes     Partners: Female     Other Topics Concern    None     Social History Narrative         Objective:     Review of Systems:  Constitutional: Negative for fatigue or malaise  Derm: see HPI  HEENT: Negative for acute hearing or vision changes  Cardiovascular: Negative for dizziness, chest pain or palpitations  Respiratory: Negative for cough, wheezing or SOB  Gastreintestinal: Negative for nausea or abdominal pain  Genital/urinary: Negative for dysuria or voiding dysfunction  Muscoloskeletal: Negative for acute myalgias or arthralgias   Neurological: Negative for headache, weakness or paresthesia  Psychological: Negative for depression or anxiety      Vitals:    10/27/17 0834 10/27/17 0934   BP: (!) 164/95 142/90   Pulse: 76    Resp: 20    Temp: 97.7 °F (36.5 °C)    TempSrc: Oral    SpO2: 92%    Weight: 325 lb (147.4 kg)    Height: 6' 1\" (1.854 m)       Body mass index is 42.88 kg/(m^2).     Physical Exam:  Constitutional: Obese, in no acute distress  Skin: 2.0 cm area of erythema and edema right mid-back, TTP  Head: normocephalic, atraumatic  Eyes: sclera clear, EOMI  Neck: normal range of motion  Cardiovascular: normal S1, S2, regular rate and rhythm  Respiratory: clear to auscultation bilaterally with symmetrical effort  Abdomen: soft, BS normal  Extremities: full range of motion  Neurology: normal strength and sensation  Psych: active, alert and oriented, affect appropriate       Health Maintenance Due   Topic Date Due    Pneumococcal 19-64 Medium Risk (1 of 1 - PPSV23) 06/07/1976    DTaP/Tdap/Td series (1 - Tdap) 01/22/2011    FOBT Q 1 YEAR AGE 50-75  09/26/2015    FOOT EXAM Q1  06/24/2016    ZOSTER VACCINE AGE 60>  04/07/2017       Risk, benefits and potential costs of recommended health maintenance discussed. Patient expressed understanding and declined at this time. Assessment and orders:       ICD-10-CM ICD-9-CM    1. Skin infection, bacterial L08.9 686.9 cephALEXin (KEFLEX) 500 mg capsule    B96.89 041.9 COLLECTION CAPILLARY BLOOD SPECIMEN   2. Uncontrolled type 2 diabetes mellitus without complication, with long-term current use of insulin (HCC) E11.65 250.02 LIPID PANEL    O13.1 G46.57 METABOLIC PANEL, COMPREHENSIVE      TSH 3RD GENERATION      AMB POC HEMOGLOBIN A1C      COLLECTION CAPILLARY BLOOD SPECIMEN   3. Essential hypertension I10 401.9 lisinopril-hydroCHLOROthiazide (PRINZIDE, ZESTORETIC) 20-25 mg per tablet      METABOLIC PANEL, COMPREHENSIVE      TSH 3RD GENERATION   4. Hyperlipidemia, unspecified hyperlipidemia type E78.5 272.4 LIPID PANEL      METABOLIC PANEL, COMPREHENSIVE         Plan of care:  Diagnoses were discussed in detail with patient. Medication risks/benefits/side effects discussed with patient. All of the patient's questions were addressed and answered to apparent satisfaction. The patient understands and agrees with our plan of care. The patient knows to call back if they have questions about the plan of care or if symptoms change. The patient received an After-Visit Summary which contains VS, diagnoses, orders, allergy and medication lists. Patient Care Team:  Nai Gill RN as Ambulatory Care Navigator [facility])    Follow-up Disposition:  Return in about 3 months (around 1/27/2018), or if symptoms worsen or fail to improve.     Future Appointments  Date Time Provider Shay Carlos   1/26/2018 8:00 AM Walter Whitfield MD [facility] SCHED       Signed By: Jeferson Berrios MD     October 29, 2017

## 2017-11-27 DIAGNOSIS — E78.5 HYPERLIPIDEMIA, UNSPECIFIED HYPERLIPIDEMIA TYPE: ICD-10-CM

## 2017-11-29 RX ORDER — PRAVASTATIN SODIUM 40 MG/1
40 TABLET ORAL
Qty: 90 TAB | Refills: 0 | Status: SHIPPED | OUTPATIENT
Start: 2017-11-29 | End: 2018-01-25 | Stop reason: SDUPTHER

## 2018-01-22 DIAGNOSIS — I10 ESSENTIAL HYPERTENSION: ICD-10-CM

## 2018-01-24 RX ORDER — LISINOPRIL AND HYDROCHLOROTHIAZIDE 20; 25 MG/1; MG/1
TABLET ORAL
Qty: 90 TAB | Refills: 0 | Status: SHIPPED | OUTPATIENT
Start: 2018-01-24 | End: 2018-01-25 | Stop reason: SDUPTHER

## 2018-01-25 ENCOUNTER — OFFICE VISIT (OUTPATIENT)
Dept: FAMILY MEDICINE CLINIC | Age: 63
End: 2018-01-25

## 2018-01-25 VITALS
HEART RATE: 72 BPM | WEIGHT: 315 LBS | SYSTOLIC BLOOD PRESSURE: 160 MMHG | DIASTOLIC BLOOD PRESSURE: 93 MMHG | HEIGHT: 73 IN | RESPIRATION RATE: 18 BRPM | OXYGEN SATURATION: 96 % | TEMPERATURE: 98.3 F | BODY MASS INDEX: 41.75 KG/M2

## 2018-01-25 DIAGNOSIS — I10 ESSENTIAL HYPERTENSION: ICD-10-CM

## 2018-01-25 DIAGNOSIS — Z12.11 SCREENING FOR MALIGNANT NEOPLASM OF COLON: ICD-10-CM

## 2018-01-25 DIAGNOSIS — E66.01 OBESITY, MORBID (HCC): ICD-10-CM

## 2018-01-25 DIAGNOSIS — E78.5 HYPERLIPIDEMIA, UNSPECIFIED HYPERLIPIDEMIA TYPE: ICD-10-CM

## 2018-01-25 PROBLEM — E11.21 TYPE 2 DIABETES MELLITUS WITH NEPHROPATHY (HCC): Status: RESOLVED | Noted: 2018-01-25 | Resolved: 2018-01-25

## 2018-01-25 PROBLEM — E11.21 TYPE 2 DIABETES MELLITUS WITH NEPHROPATHY (HCC): Status: ACTIVE | Noted: 2018-01-25

## 2018-01-25 RX ORDER — GLUCOSAMINE/CHONDR SU A SOD 167-133 MG
500 CAPSULE ORAL
COMMUNITY

## 2018-01-25 RX ORDER — PRAVASTATIN SODIUM 40 MG/1
40 TABLET ORAL
Qty: 90 TAB | Refills: 1 | Status: SHIPPED | OUTPATIENT
Start: 2018-01-25 | End: 2018-10-08 | Stop reason: SDUPTHER

## 2018-01-25 RX ORDER — VERAPAMIL HYDROCHLORIDE 180 MG/1
180 TABLET, EXTENDED RELEASE ORAL
Qty: 90 TAB | Refills: 1 | Status: SHIPPED | OUTPATIENT
Start: 2018-01-25 | End: 2018-10-08 | Stop reason: SDUPTHER

## 2018-01-25 RX ORDER — LISINOPRIL AND HYDROCHLOROTHIAZIDE 20; 25 MG/1; MG/1
1 TABLET ORAL DAILY
Qty: 90 TAB | Refills: 1 | Status: SHIPPED | OUTPATIENT
Start: 2018-01-25 | End: 2018-10-08 | Stop reason: SDUPTHER

## 2018-01-25 NOTE — PROGRESS NOTES
Lola Saxena  63 y.o. male  1955  [address] [facility]  [id number]     [id number]: Progress Note       Encounter Date: 1/25/2018    Chief Complaint   Patient presents with    Diabetes    Cholesterol Problem    Hypertension       Patient was accompanied by patient  History of Present Illness   Lola Saxena is a 63 y.o. male who presents to clinic today for:    Diabetes Follow up: Uncontrolled   Overall the patient feels he is well   Diabetic Consultants:Endocrinologist - N/A   Therapy:oral agent (monotherapy): metformin (generic)  insulin injections: NPH: 30 units BID   Medication compliance:Good   Frequency of home glucose testing: Daily   Blood Sugar range at home: 160-180   Polyuria, polyphagia or polydipsia: NO   Low blood sugar symptoms: No   Dietary compliance: Good   On ASA: No:   Pertinent Labs:      Hemoglobin A1c   Date Value Ref Range Status   01/31/2017 11.6 (H) 4.8 - 5.6 % Final     Comment:              Pre-diabetes: 5.7 - 6.4           Diabetes: >6.4           Glycemic control for adults with diabetes: <7.0     02/19/2016 9.3 (H) 4.8 - 5.6 % Final     Comment:              Pre-diabetes: 5.7 - 6.4           Diabetes: >6.4           Glycemic control for adults with diabetes: <7.0     06/24/2015 9.8 (H) 4.8 - 5.6 % Final     Comment:              Increased risk for diabetes: 5.7 - 6.4           Diabetes: >6.4           Glycemic control for adults with diabetes: <7.0       Estimated average glucose   Date Value Ref Range Status   01/31/2017 286 mg/dL Final     Cholesterol, total   Date Value Ref Range Status   10/27/2017 176 100 - 199 mg/dL Final     HDL Cholesterol   Date Value Ref Range Status   10/27/2017 44 >39 mg/dL Final        Wt Readings from Last 3 Encounters:   01/25/18 324 lb (147 kg)   10/27/17 325 lb (147.4 kg)   01/31/17 317 lb (143.8 kg)        History   Smoking Status    Former Smoker    Quit date: 6/1/1992   Smokeless Tobacco    Never Used Hypertension: Uncontrolled   BP Readings from Last 3 Encounters:   01/25/18 (!) 160/93   10/27/17 142/90   01/31/17 149/90     The patient reports:  taking medications as instructed (patient had run out of lisinopril-HCTZ for 1 week prior), no medication side effects noted, no TIA's, no chest pain on exertion, no dyspnea on exertion, no swelling of ankles. Sodium   Date Value Ref Range Status   10/27/2017 141 134 - 144 mmol/L Final     Potassium   Date Value Ref Range Status   10/27/2017 4.8 3.5 - 5.2 mmol/L Final     Chloride   Date Value Ref Range Status   10/27/2017 100 96 - 106 mmol/L Final     Creatinine   Date Value Ref Range Status   10/27/2017 1.03 0.76 - 1.27 mg/dL Final   01/31/2017 1.00 0.76 - 1.27 mg/dL Final   02/19/2016 1.22 0.76 - 1.27 mg/dL Final     GFR est AA   Date Value Ref Range Status   10/27/2017 91 >59 mL/min/1.73 Final   01/31/2017 95 >59 mL/min/1.73 Final   02/19/2016 75 >59 mL/min/1.73 Final     GFR est non-AA   Date Value Ref Range Status   10/27/2017 79 >59 mL/min/1.73 Final   01/31/2017 82 >59 mL/min/1.73 Final   02/19/2016 65 >59 mL/min/1.73 Final       Our goal is to normalize the blood pressure to decrease the risks of strokes and heart attacks. The patient is in agreement with the plan. Hyperlipidemia:  Stable  Cardiovascular risks for him are: diabetic  hypertension  hyperlipidemia  obese. Currently he takes Pravachol (pravastatin)   Myalgias: No    Cholesterol, total   Date Value Ref Range Status   10/27/2017 176 100 - 199 mg/dL Final   01/31/2017 163 100 - 199 mg/dL Final   02/19/2016 186 100 - 199 mg/dL Final     HDL Cholesterol   Date Value Ref Range Status   10/27/2017 44 >39 mg/dL Final   01/31/2017 41 >39 mg/dL Final   02/19/2016 30 (L) >39 mg/dL Final     Comment:     According to ATP-III Guidelines, HDL-C >59 mg/dL is considered a  negative risk factor for CHD.        Triglyceride   Date Value Ref Range Status   10/27/2017 155 (H) 0 - 149 mg/dL Final 01/31/2017 142 0 - 149 mg/dL Final   02/19/2016 553 (HH) 0 - 149 mg/dL Final     ALT (SGPT)   Date Value Ref Range Status   10/27/2017 35 0 - 44 IU/L Final     AST (SGOT)   Date Value Ref Range Status   10/27/2017 17 0 - 40 IU/L Final     Alk. phosphatase   Date Value Ref Range Status   10/27/2017 59 39 - 117 IU/L Final       Wt Readings from Last 3 Encounters:   01/25/18 324 lb (147 kg)   10/27/17 325 lb (147.4 kg)   01/31/17 317 lb (143.8 kg)             Health Maintenance  Release signed for records    Health Maintenance Due   Topic Date Due    Pneumococcal 19-64 Medium Risk (1 of 1 - PPSV23) 06/07/1976    DTaP/Tdap/Td series (1 - Tdap) 01/22/2011    FOBT Q 1 YEAR AGE 50-75  09/26/2015    FOOT EXAM Q1  06/24/2016    ZOSTER VACCINE AGE 60>  04/07/2017    EYE EXAM RETINAL OR DILATED Q1  12/28/2017    MICROALBUMIN Q1  01/31/2018     Review of Systems   Review of Systems   Constitutional: Negative for chills, diaphoresis and fever. HENT: Negative for congestion, sinus pain and sore throat. Eyes: Negative for discharge and redness. Respiratory: Negative for cough, sputum production and shortness of breath. Cardiovascular: Negative for chest pain, palpitations and leg swelling. Gastrointestinal: Negative for abdominal pain, blood in stool, constipation, diarrhea, nausea and vomiting. Musculoskeletal: Negative for falls. Skin: Negative for itching and rash. Neurological: Negative for dizziness, focal weakness, seizures, loss of consciousness, weakness and headaches. Vitals/Objective:     Vitals:    01/25/18 0804 01/25/18 0826   BP: (!) 165/95 (!) 160/93   Pulse: 71 72   Resp: 18    Temp: 98.3 °F (36.8 °C)    TempSrc: Oral    SpO2: 96%    Weight: 324 lb (147 kg)    Height: 6' 1\" (1.854 m)      Body mass index is 42.75 kg/(m^2). Physical Exam   Constitutional: He is oriented to person, place, and time. He appears well-developed and well-nourished. No distress.    HENT:   Head: Normocephalic and atraumatic. Eyes: Right eye exhibits no discharge. Left eye exhibits no discharge. No scleral icterus. Neck: Normal range of motion. Neck supple. Carotid bruit is not present. Cardiovascular: Normal rate and regular rhythm. No murmur heard. Pulmonary/Chest: Effort normal and breath sounds normal. No respiratory distress. He has no wheezes. He has no rales. Musculoskeletal: He exhibits no edema or tenderness. Lymphadenopathy:     He has no cervical adenopathy. Neurological: He is alert and oriented to person, place, and time. Skin: Skin is warm and dry. No rash noted. He is not diaphoretic. No erythema. Diabetic foot exam:     Left foot:   Reflexes  present  Vibratory sensation  present   Proprioception  present  Sharp/dull discrimination  present  Filament test normal sensation with micro filament   Pulse DP: 2+   Pulse PT: 1+ (weak)    Deformities: normal toes without deformities  Right foot:   Reflexes  present  Vibratory sensation  present   Proprioception  present  Sharp/dull discrimination  present  Filament test normal sensation with micro filament   Pulse DP: 2+   Pulse PT: 1+ (weak)    Deformities:normal toes without deformities        No results found for this or any previous visit (from the past 24 hour(s)). Assessment and Plan:     Encounter Diagnoses     ICD-10-CM ICD-9-CM   1. Uncontrolled type 2 diabetes mellitus with diabetic nephropathy, with long-term current use of insulin (HCC) E11.21 250.42    E11.65 583.81    Z79.4 V58.67   2. Essential hypertension I10 401.9   3. Hyperlipidemia, unspecified hyperlipidemia type E78.5 272.4   4. Obesity, morbid ([facility] [address] E66.01 278.01   5. Screening for malignant neoplasm of colon Z12.11 V76.51       1. Uncontrolled type 2 diabetes mellitus with diabetic nephropathy, with long-term current use of insulin (HCC)  Last HbA1c was much worse though reports home BS much improved.  Will recheck A1c today.   - HEMOGLOBIN A1C WITH EAG  - MICROALBUMIN, UR, RAND W/ MICROALBUMIN/CREA RATIO  -  DIABETES FOOT EXAM  - insulin NPH (NOVOLIN N, HUMULIN N) 100 unit/mL injection; Inject 30 units twice a day for DM-2 (250.00)  Indications: type 2 diabetes mellitus  Dispense: 3 Vial; Refill: 3    2. Essential hypertension  Not at goal as patient has been without medication. He does not have a home monitor.   - lisinopril-hydroCHLOROthiazide (PRINZIDE, ZESTORETIC) 20-25 mg per tablet; Take 1 Tab by mouth daily. Dispense: 90 Tab; Refill: 1  - verapamil ER (CALAN-SR) 180 mg CR tablet; Take 1 Tab by mouth nightly. Dispense: 90 Tab; Refill: 1  - METABOLIC PANEL, BASIC    3. Hyperlipidemia, unspecified hyperlipidemia type  Tolerating well without issues. - pravastatin (PRAVACHOL) 40 mg tablet; Take 1 Tab by mouth nightly. Dispense: 90 Tab; Refill: 1  - LIPID PANEL    4. Obesity, morbid ([facility] [address]  Discussed the patient's BMI with him. The BMI follow up plan is as follows:     dietary management education, guidance, and counseling  encourage exercise  monitor weight  prescribed dietary intake    An After Visit Summary was printed and given to the patient. 5. Screening for malignant neoplasm of colon  - OCCULT BLOOD, IMMUNOASSAY (FIT)      I have discussed the diagnosis with the patient and the intended plan as seen in the above orders. he has expressed understanding. The patient has received an after-visit summary and questions were answered concerning future plans. I have discussed medication side effects and warnings with the patient as well. Electronically Signed: Reyes Blanks, MD     History/Allergies   Patients past medical, surgical and family histories were reviewed and updated.     Past Medical History:   Diagnosis Date    Hypercholesterolemia     Hypertension       Past Surgical History:   Procedure Laterality Date    HX ORTHOPAEDIC  2004    rt shoulder surgery    HX ORTHOPAEDIC      fx lft arm     Family History   Problem Relation Age of Onset    Diabetes Father     Hypertension Father      Social History     Social History    Marital status:      Spouse name: N/A    Number of children: N/A    Years of education: N/A     Occupational History    Not on file. Social History Main Topics    Smoking status: Former Smoker     Quit date: 6/1/1992    Smokeless tobacco: Never Used    Alcohol use No    Drug use: No    Sexual activity: Yes     Partners: Female     Other Topics Concern    Not on file     Social History Narrative         Allergies   Allergen Reactions    Cyclobenzaprine Other (comments)     Leg cramps    Percocet [Oxycodone-Acetaminophen] Nausea Only       Disposition     Follow-up Disposition:  Return in about 3 months (around 4/25/2018) for Routine. .    No future appointments. Current Medications after this visit     Current Outpatient Prescriptions   Medication Sig    nicotinic acid (NIACIN) 500 mg tablet Take 500 mg by mouth Daily (before breakfast).  lisinopril-hydroCHLOROthiazide (PRINZIDE, ZESTORETIC) 20-25 mg per tablet Take 1 Tab by mouth daily.  verapamil ER (CALAN-SR) 180 mg CR tablet Take 1 Tab by mouth nightly.  pravastatin (PRAVACHOL) 40 mg tablet Take 1 Tab by mouth nightly.  insulin NPH (NOVOLIN N, HUMULIN N) 100 unit/mL injection Inject 30 units twice a day for DM-2 (250.00)  Indications: type 2 diabetes mellitus    metFORMIN (GLUCOPHAGE) 1,000 mg tablet Take 1 Tab by mouth two (2) times daily (with meals).  metoprolol tartrate (LOPRESSOR) 50 mg tablet Take 1 Tab by mouth two (2) times a day.  insulin syringe-needle U-100 Dispense microfine 1 mL syringes with 30 gauge needle. Inject 23 units NPH twice a day for DM-2 (250.00)    Insulin Needles, Disposable, 31 X 5/16 \" ndle Inject hs for DM-2 250.00    glucose blood VI test strips (BLOOD GLUCOSE TEST) strip Test TID for DM-2 treated with insulin.  (Patient taking differently: Test once a day for DM-2 treated with insulin.)  Lancets misc Test TID for DM-2 treated with insulin (250.00)    omega-3 fatty acids-vitamin e (FISH OIL) 1,000 mg cap Take 2 Caps by mouth two (2) times a day.  aspirin (ASPIRIN) 325 mg tablet Take 1 Tab by mouth daily. No current facility-administered medications for this visit.       Medications Discontinued During This Encounter   Medication Reason    triamcinolone (KENALOG) 0.1 % lotion Not A Current Medication    fluticasone (FLONASE) 50 mcg/actuation nasal spray Not A Current Medication    lisinopril-hydroCHLOROthiazide (PRINZIDE, ZESTORETIC) 20-25 mg per tablet Reorder    verapamil ER (CALAN-SR) 180 mg CR tablet Reorder    pravastatin (PRAVACHOL) 40 mg tablet Reorder    insulin NPH (NOVOLIN N, HUMULIN N) 100 unit/mL injection Reorder

## 2018-01-25 NOTE — PATIENT INSTRUCTIONS
Fecal Immunochemical Test (FIT): About This Test  What is it? A fecal immunochemical test, or FIT, checks for hidden blood in the stool. Your doctor gives you a kit that contains everything you need. At home, you follow simple steps to collect a small amount of stool. You return the kit to the doctor or to a lab. Why is this test done? This test is done to check for colorectal cancer and other types of gastrointestinal problems, such as hemorrhoids, anal fissures, and colon polyps, that can cause blood in the stools. How can you prepare for the test?  · Don't do the test during your menstrual period or if you are having bleeding from hemorrhoids. What happens during the test?  There are different types of home tests available. It is important to follow the instructions provided with any test.  Here are some general instructions:  · Check the expiration date on the package. Don't use a test kit after its expiration date. · Follow the instructions exactly. Do all the steps, in order, without skipping any of them. · After you finish your test, follow the instructions that you were given for returning the test.  There is a FIT test that shows the results right away. If your test shows that blood was found in your stool sample, call your doctor as soon as possible. Follow-up care is a key part of your treatment and safety. Be sure to make and go to all appointments, and call your doctor if you are having problems. It's also a good idea to keep a list of the medicines you take. Ask your doctor when you can expect to have your test results. Where can you learn more? Go to http://live-carmen.info/. Enter U300 in the search box to learn more about \"Fecal Immunochemical Test (FIT): About This Test.\"  Current as of: May 12, 2017  Content Version: 11.4  © 3728-2047 [employer].  Care instructions adapted under license by [employer] (which disclaims liability or warranty for this information). If you have questions about a medical condition or this instruction, always ask your healthcare professional. [address] any warranty or liability for your use of this information. Body Mass Index: Care Instructions  Your Care Instructions    Body mass index (BMI) can help you see if your weight is raising your risk for health problems. It uses a formula to compare how much you weigh with how tall you are. · A BMI lower than 18.5 is considered underweight. · A BMI between 18.5 and 24.9 is considered healthy. · A BMI between 25 and 29.9 is considered overweight. A BMI of 30 or higher is considered obese. If your BMI is in the normal range, it means that you have a lower risk for weight-related health problems. If your BMI is in the overweight or obese range, you may be at increased risk for weight-related health problems, such as high blood pressure, heart disease, stroke, arthritis or joint pain, and diabetes. If your BMI is in the underweight range, you may be at increased risk for health problems such as fatigue, lower protection (immunity) against illness, muscle loss, bone loss, hair loss, and hormone problems. BMI is just one measure of your risk for weight-related health problems. You may be at higher risk for health problems if you are not active, you eat an unhealthy diet, or you drink too much alcohol or use tobacco products. Follow-up care is a key part of your treatment and safety. Be sure to make and go to all appointments, and call your doctor if you are having problems. It's also a good idea to know your test results and keep a list of the medicines you take. How can you care for yourself at home? · Practice healthy eating habits. This includes eating plenty of fruits, vegetables, whole grains, lean protein, and low-fat dairy. · If your doctor recommends it, get more exercise. Walking is a good choice.  Bit by bit, increase the amount you walk every day. Try for at least 30 minutes on most days of the week. · Do not smoke. Smoking can increase your risk for health problems. If you need help quitting, talk to your doctor about stop-smoking programs and medicines. These can increase your chances of quitting for good. · Limit alcohol to 2 drinks a day for men and 1 drink a day for women. Too much alcohol can cause health problems. If you have a BMI higher than 25  · Your doctor may do other tests to check your risk for weight-related health problems. This may include measuring the distance around your waist. A waist measurement of more than 40 inches in men or 35 inches in women can increase the risk of weight-related health problems. · Talk with your doctor about steps you can take to stay healthy or improve your health. You may need to make lifestyle changes to lose weight and stay healthy, such as changing your diet and getting regular exercise. If you have a BMI lower than 18.5  · Your doctor may do other tests to check your risk for health problems. · Talk with your doctor about steps you can take to stay healthy or improve your health. You may need to make lifestyle changes to gain or maintain weight and stay healthy, such as getting more healthy foods in your diet and doing exercises to build muscle. Where can you learn more? Go to http://live-carmen.info/. Enter S176 in the search box to learn more about \"Body Mass Index: Care Instructions. \"  Current as of: October 13, 2016  Content Version: 11.4  © 7356-6047 [employer]. Care instructions adapted under license by [employer] (which disclaims liability or warranty for this information). If you have questions about a medical condition or this instruction, always ask your healthcare professional. [place] [address] any warranty or liability for your use of this information.

## 2018-01-25 NOTE — MR AVS SNAPSHOT
[facility] 
 
 
 [address] [address] 
[phone] Patient: Julissa Boland MRN: [id number] ZKM:[birth year] Visit Information Date & Time Provider Department Dept. Phone Encounter #  
 1/25/2018  8:00 AM Cailin Rich MD [address] [id number] Follow-up Instructions Return in about 3 months (around 4/25/2018) for Routine. [place] Upcoming Health Maintenance Date Due Pneumococcal 19-64 Medium Risk (1 of 1 - PPSV23) 6/7/1976 DTaP/Tdap/Td series (1 - Tdap) 1/22/2011 FOBT Q 1 YEAR AGE 50-75 9/26/2015 FOOT EXAM Q1 6/24/2016 ZOSTER VACCINE AGE 60> 4/7/2017 EYE EXAM RETINAL OR DILATED Q1 12/28/2017 MICROALBUMIN Q1 1/31/2018 HEMOGLOBIN A1C Q6M 4/27/2018 LIPID PANEL Q1 10/27/2018 Allergies as of 1/25/2018  Review Complete On: 1/25/2018 By: Leonor Capellan LPN Severity Noted Reaction Type Reactions Cyclobenzaprine  09/26/2014   Side Effect Other (comments) Leg cramps Percocet [Oxycodone-acetaminophen]  02/25/2010    Nausea Only Current Immunizations  Reviewed on 12/22/2010 Name Date Influenza Vaccine Split 12/22/2010 TD Vaccine 1/21/2011 Not reviewed this visit You Were Diagnosed With   
  
 Codes Comments Uncontrolled type 2 diabetes mellitus with diabetic nephropathy, with long-term current use of insulin ([facility].)    -  Primary ICD-10-CM: E11.21, E11.65, Z79.4 ICD-9-CM: 250.42, 583.81, V58.67 Essential hypertension     ICD-10-CM: I10 
ICD-9-CM: 401.9 Hyperlipidemia, unspecified hyperlipidemia type     ICD-10-CM: E78.5 ICD-9-CM: 272.4 Obesity, morbid ([facility]     ICD-10-CM: E66.01 
ICD-9-CM: 278.01 Screening for malignant neoplasm of colon     ICD-10-CM: Z12.11 ICD-9-CM: V76.51 Vitals BP Pulse Temp Resp Height(growth percentile) Weight(growth percentile) (!) 165/95 71 98.3 °F (36.8 °C) (Oral) 18 6' 1\" (1.854 m) 324 lb (147 kg) SpO2 BMI Smoking Status 96% 42.75 kg/m2 Former Smoker Vitals History BMI and BSA Data Body Mass Index Body Surface Area 42.75 kg/m 2 2.75 m 2 Preferred Pharmacy Pharmacy Name Phone [address] [place] [address] [phone] Your Updated Medication List  
  
   
This list is accurate as of: 1/25/18  8:26 AM.  Always use your most recent med list.  
  
  
  
  
 aspirin 325 mg tablet Commonly known as:  ASPIRIN Take 1 Tab by mouth daily. glucose blood VI test strips strip Commonly known as:  blood glucose test  
Test TID for DM-2 treated with insulin. Insulin Needles (Disposable) 31 gauge x 5/16\" Ndle Inject hs for DM-2 250.00  
  
 insulin  unit/mL injection Commonly known as:  Jessica Ear Inject 30 units twice a day for DM-2 (250.00)  Indications: type 2 diabetes mellitus  
  
 insulin syringe-needle U-100 Dispense microfine 1 mL syringes with 30 gauge needle. Inject 23 units NPH twice a day for DM-2 (250.00) Lancets Misc Test TID for DM-2 treated with insulin (250.00)  
  
 lisinopril-hydroCHLOROthiazide 20-25 mg per tablet Commonly known as:  Cristian Hastings Take 1 Tab by mouth daily. metFORMIN 1,000 mg tablet Commonly known as:  GLUCOPHAGE Take 1 Tab by mouth two (2) times daily (with meals). metoprolol tartrate 50 mg tablet Commonly known as:  LOPRESSOR Take 1 Tab by mouth two (2) times a day. nicotinic acid 500 mg tablet Commonly known as:  NIACIN Take 500 mg by mouth Daily (before breakfast). omega-3 fatty acids-vitamin e 1,000 mg Cap Commonly known as:  FISH OIL Take 2 Caps by mouth two (2) times a day. pravastatin 40 mg tablet Commonly known as:  PRAVACHOL Take 1 Tab by mouth nightly. verapamil  mg CR tablet Commonly known as:  CALAN-SR Take 1 Tab by mouth nightly. Prescriptions Sent to Pharmacy Refills  
 lisinopril-hydroCHLOROthiazide (PRINZIDE, ZESTORETIC) 20-25 mg per tablet 1 Sig: Take 1 Tab by mouth daily. Class: Normal  
 Pharmacy: [address] [place] [address] Ph #: [phone] Route: Oral  
 verapamil ER (CALAN-SR) 180 mg CR tablet 1 Sig: Take 1 Tab by mouth nightly. Class: Normal  
 Pharmacy: [address] [place] [address] Ph #: [phone] Route: Oral  
 pravastatin (PRAVACHOL) 40 mg tablet 1 Sig: Take 1 Tab by mouth nightly. Class: Normal  
 Pharmacy: [address] [place] [address] Ph #: [phone] Route: Oral  
  
We Performed the Following HEMOGLOBIN A1C WITH EAG [39215 CPT(R)]  DIABETES FOOT EXAM [7 Custom] LIPID PANEL [28125 CPT(R)] METABOLIC PANEL, BASIC [42674 CPT(R)] MICROALBUMIN, UR, RAND W/ MICROALBUMIN/CREA RATIO [id number] CPT(R)] OCCULT BLOOD, IMMUNOASSAY (FIT) [id number] CPT(R)] Follow-up Instructions Return in about 3 months (around 4/25/2018) for Routine. .  
  
  
Patient Instructions Fecal Immunochemical Test (FIT): About This Test 
What is it? A fecal immunochemical test, or FIT, checks for hidden blood in the stool. Your doctor gives you a kit that contains everything you need. At home, you follow simple steps to collect a small amount of stool. You return the kit to the doctor or to a lab. Why is this test done? This test is done to check for colorectal cancer and other types of gastrointestinal problems, such as hemorrhoids, anal fissures, and colon polyps, that can cause blood in the stools. How can you prepare for the test? 
· Don't do the test during your menstrual period or if you are having bleeding from hemorrhoids.  
What happens during the test? 
 There are different types of home tests available. It is important to follow the instructions provided with any test. 
Here are some general instructions: 
· Check the expiration date on the package. Don't use a test kit after its expiration date. · Follow the instructions exactly. Do all the steps, in order, without skipping any of them. · After you finish your test, follow the instructions that you were given for returning the test. 
There is a FIT test that shows the results right away. If your test shows that blood was found in your stool sample, call your doctor as soon as possible. Follow-up care is a key part of your treatment and safety. Be sure to make and go to all appointments, and call your doctor if you are having problems. It's also a good idea to keep a list of the medicines you take. Ask your doctor when you can expect to have your test results. Where can you learn more? Go to http://live-carmen.info/. Enter Y914 in the search box to learn more about \"Fecal Immunochemical Test (FIT): About This Test.\" Current as of: May 12, 2017 Content Version: 11.4 © 6959-3715 [employer]. Care instructions adapted under license by [employer] (which disclaims liability or warranty for this information). If you have questions about a medical condition or this instruction, always ask your healthcare professional. [address] any warranty or liability for your use of this information. Introducing [facility]! [facility] introduces [employer] patient portal. Now you can access parts of your medical record, email your doctor's office, and request medication refills online. 1. In your internet browser, go to [email or web address] audience 2. Click on the First Time User? Click Here link in the Sign In box. You will see the New Member Sign Up page. 3. Enter your [employer] Access Code exactly as it appears below.  You will not need to use this code after youve completed the sign-up process. If you do not sign up before the expiration date, you must request a new code. · Sp[employer] Wave Access Code: [email or web address] Expires: 1/25/2018  8:35 AM 
 
4. Enter the last four digits of your Social Security Number (xxxx) and Date of Birth (mm/dd/yyyy) as indicated and click Submit. You will be taken to the next sign-up page. 5. Create a Spine Wave ID. This will be your Sp[employer] Wave login ID and cannot be changed, so think of one that is secure and easy to remember. 6. Create a Sp[employer] Wave password. You can change your password at any time. 7. Enter your Password Reset Question and Answer. This can be used at a later time if you forget your password. 8. Enter your e-mail address. You will receive e-mail notification when new information is available in [address]. 9. Click Sign Up. You can now view and download portions of your medical record. 10. Click the Download Summary menu link to download a portable copy of your medical information. If you have questions, please visit the Frequently Asked Questions section of the [employer] Wave website. Remember, [employer] Wave is NOT to be used for urgent needs. For medical emergencies, dial 911. Now available from your iPhone and Android! Please provide this summary of care documentation to your next provider. If you have any questions after today's visit, please call [phone].

## 2018-01-25 NOTE — PROGRESS NOTES
1. Have you been to the ER, urgent care clinic, or been hospitalized since your last visit? No     2. Have you seen or consulted any other health care providers outside of the [address] [place] [address] since your last visit?   No     Reviewed record in preparation for visit and have necessary documentation  Pt did not bring medication to office visit for review  Information was given to pt on Advanced Directives, Living Will  Information was given on Shingles Vaccine  opportunity was given for questions  Goals that were addressed and/or need to be completed during or after this appointment include   Health Maintenance Due   Topic Date Due    Pneumococcal 19-64 Medium Risk (1 of 1 - PPSV23) 06/07/1976    DTaP/Tdap/Td series (1 - Tdap) 01/22/2011    FOBT Q 1 YEAR AGE 50-75  09/26/2015    FOOT EXAM Q1  06/24/2016    ZOSTER VACCINE AGE 60>  04/07/2017    EYE EXAM RETINAL OR DILATED Q1  12/28/2017    MICROALBUMIN Q1  01/31/2018

## 2018-01-26 LAB
ALBUMIN/CREAT UR: 87.5 MG/G CREAT (ref 0–30)
BUN SERPL-MCNC: 18 MG/DL (ref 8–27)
BUN/CREAT SERPL: 18 (ref 10–24)
CALCIUM SERPL-MCNC: 9.6 MG/DL (ref 8.6–10.2)
CHLORIDE SERPL-SCNC: 101 MMOL/L (ref 96–106)
CHOLEST SERPL-MCNC: 130 MG/DL (ref 100–199)
CO2 SERPL-SCNC: 24 MMOL/L (ref 18–29)
CREAT SERPL-MCNC: 0.98 MG/DL (ref 0.76–1.27)
CREAT UR-MCNC: 74.5 MG/DL
EST. AVERAGE GLUCOSE BLD GHB EST-MCNC: 243 MG/DL
GFR SERPLBLD CREATININE-BSD FMLA CKD-EPI: 83 ML/MIN/1.73
GFR SERPLBLD CREATININE-BSD FMLA CKD-EPI: 96 ML/MIN/1.73
GLUCOSE SERPL-MCNC: 136 MG/DL (ref 65–99)
HBA1C MFR BLD: 10.1 % (ref 4.8–5.6)
HDLC SERPL-MCNC: 40 MG/DL
LDLC SERPL CALC-MCNC: 67 MG/DL (ref 0–99)
Lab: NORMAL
MICROALBUMIN UR-MCNC: 65.2 UG/ML
POTASSIUM SERPL-SCNC: 4.3 MMOL/L (ref 3.5–5.2)
SODIUM SERPL-SCNC: 141 MMOL/L (ref 134–144)
TRIGL SERPL-MCNC: 113 MG/DL (ref 0–149)
VLDLC SERPL CALC-MCNC: 23 MG/DL (ref 5–40)

## 2018-01-31 ENCOUNTER — TELEPHONE (OUTPATIENT)
Dept: FAMILY MEDICINE CLINIC | Age: 63
End: 2018-01-31

## 2018-01-31 NOTE — TELEPHONE ENCOUNTER
Advised per lab letter: Your HbA1c is improving but it is still not at goal. I recommend increasing your insulin to 35 units twice a day. And calling with your blood sugars 2 weeks later.      Verbalized understanding

## 2018-02-27 DIAGNOSIS — I10 ESSENTIAL HYPERTENSION: ICD-10-CM

## 2018-02-28 RX ORDER — METOPROLOL TARTRATE 50 MG/1
50 TABLET ORAL 2 TIMES DAILY
Qty: 180 TAB | Refills: 2 | Status: SHIPPED | OUTPATIENT
Start: 2018-02-28 | End: 2019-01-08 | Stop reason: SDUPTHER

## 2018-05-23 RX ORDER — METFORMIN HYDROCHLORIDE 1000 MG/1
1000 TABLET ORAL 2 TIMES DAILY WITH MEALS
Qty: 60 TAB | Refills: 0 | Status: SHIPPED | OUTPATIENT
Start: 2018-05-23 | End: 2018-05-29 | Stop reason: SDUPTHER

## 2018-05-29 ENCOUNTER — OFFICE VISIT (OUTPATIENT)
Dept: FAMILY MEDICINE CLINIC | Age: 63
End: 2018-05-29

## 2018-05-29 VITALS
BODY MASS INDEX: 41.75 KG/M2 | RESPIRATION RATE: 22 BRPM | HEART RATE: 75 BPM | DIASTOLIC BLOOD PRESSURE: 85 MMHG | OXYGEN SATURATION: 95 % | WEIGHT: 315 LBS | TEMPERATURE: 97.9 F | SYSTOLIC BLOOD PRESSURE: 133 MMHG | HEIGHT: 73 IN

## 2018-05-29 DIAGNOSIS — R53.83 FATIGUE, UNSPECIFIED TYPE: ICD-10-CM

## 2018-05-29 DIAGNOSIS — I10 ESSENTIAL HYPERTENSION: ICD-10-CM

## 2018-05-29 DIAGNOSIS — E66.01 OBESITY, MORBID (HCC): ICD-10-CM

## 2018-05-29 DIAGNOSIS — Z12.11 SCREENING FOR MALIGNANT NEOPLASM OF COLON: ICD-10-CM

## 2018-05-29 DIAGNOSIS — R61 ABNORMAL FLUSHING AND SWEATING: ICD-10-CM

## 2018-05-29 DIAGNOSIS — R23.2 ABNORMAL FLUSHING AND SWEATING: ICD-10-CM

## 2018-05-29 RX ORDER — METFORMIN HYDROCHLORIDE 1000 MG/1
1000 TABLET ORAL 2 TIMES DAILY WITH MEALS
Qty: 180 TAB | Refills: 1 | Status: SHIPPED | OUTPATIENT
Start: 2018-05-29 | End: 2018-10-08 | Stop reason: SDUPTHER

## 2018-05-29 NOTE — MR AVS SNAPSHOT
[address] [address] 
[phone] Patient: Sid Caban MRN: [id number] UDR:2/5/4435 Visit Information Date & Time Provider Department Dept. Phone Encounter #  
 5/29/2018  9:10 AM Lloyd Huang MD  [facility] [place] [id number] Follow-up Instructions Return in about 3 months (around 8/29/2018) for Routine. [place] Upcoming Health Maintenance Date Due Pneumococcal 19-64 Medium Risk (1 of 1 - PPSV23) 6/7/1976 DTaP/Tdap/Td series (1 - Tdap) 1/22/2011 FOBT Q 1 YEAR AGE 50-75 9/26/2015 ZOSTER VACCINE AGE 60> 4/7/2017 EYE EXAM RETINAL OR DILATED Q1 12/28/2017 HEMOGLOBIN A1C Q6M 7/25/2018 FOOT EXAM Q1 1/25/2019 MICROALBUMIN Q1 1/25/2019 LIPID PANEL Q1 1/25/2019 Allergies as of 5/29/2018  Review Complete On: 5/29/2018 By: Lloyd Huang MD  
  
 Severity Noted Reaction Type Reactions Cyclobenzaprine  09/26/2014   Side Effect Other (comments) Leg cramps Percocet [Oxycodone-acetaminophen]  02/25/2010    Nausea Only Current Immunizations  Reviewed on 12/22/2010 Name Date Influenza Vaccine Split 12/22/2010 TD Vaccine 1/21/2011 Not reviewed this visit You Were Diagnosed With   
  
 Codes Comments Uncontrolled type 2 diabetes mellitus with diabetic nephropathy, with long-term current use of insulin ([facility]    -  Primary ICD-10-CM: E11.21, E11.65, Z79.4 ICD-9-CM: 250.42, 583.81, V58.67 Screening for malignant neoplasm of colon     ICD-10-CM: Z12.11 ICD-9-CM: V76.51 Essential hypertension     ICD-10-CM: I10 
ICD-9-CM: 401.9 Abnormal flushing and sweating     ICD-10-CM: R23.2 ICD-9-CM: 782.62 Vitals BP Pulse Temp Resp Height(growth percentile) Weight(growth percentile)  133/85 (BP 1 Location: Left arm, BP Patient Position: Sitting) 75 97.9 °F (36.6 °C) (Oral) 22 6' 1\" (1.854 m) 316 lb (143.3 kg) SpO2 BMI Smoking Status 95% 41.69 kg/m2 Former Smoker Vitals History BMI and BSA Data Body Mass Index Body Surface Area  
 41.69 kg/m 2 2.72 m 2 Preferred Pharmacy Pharmacy Name Phone [address] [place] [address], [address] [place], [place] [address] [phone] Your Updated Medication List  
  
   
This list is accurate as of 5/29/18  9:48 AM.  Always use your most recent med list.  
  
  
  
  
 aspirin 325 mg tablet Commonly known as:  ASPIRIN Take 1 Tab by mouth daily. glucose blood VI test strips strip Commonly known as:  blood glucose test  
Test TID for DM-2 treated with insulin. Insulin Needles (Disposable) 31 gauge x 5/16\" Ndle Inject hs for DM-2 250.00  
  
 insulin  unit/mL injection Commonly known as:  Jacolyn Sheets Inject 35 units twice a day for DM-2 (250.00)  Indications: type 2 diabetes mellitus  
  
 insulin syringe-needle U-100 Dispense microfine 1 mL syringes with 30 gauge needle. Inject 23 units NPH twice a day for DM-2 (250.00) Lancets Misc Test TID for DM-2 treated with insulin (250.00)  
  
 lisinopril-hydroCHLOROthiazide 20-25 mg per tablet Commonly known as:  José Sinning Take 1 Tab by mouth daily. metFORMIN 1,000 mg tablet Commonly known as:  GLUCOPHAGE Take 1 Tab by mouth two (2) times daily (with meals). metoprolol tartrate 50 mg tablet Commonly known as:  LOPRESSOR Take 1 Tab by mouth two (2) times a day. nicotinic acid 500 mg tablet Commonly known as:  NIACIN Take 500 mg by mouth Daily (before breakfast). omega-3 fatty acids-vitamin e 1,000 mg Cap Commonly known as:  FISH OIL Take 2 Caps by mouth two (2) times a day. pravastatin 40 mg tablet Commonly known as:  PRAVACHOL Take 1 Tab by mouth nightly. verapamil  mg CR tablet Commonly known as:  CALAN-SR  
 Take 1 Tab by mouth nightly. Prescriptions Sent to Pharmacy Refills  
 metFORMIN (GLUCOPHAGE) 1,000 mg tablet 1 Sig: Take 1 Tab by mouth two (2) times daily (with meals). Class: Normal  
 Pharmacy: [facility] [address] RAMY ALVAREZ [address] [address] Ph #: [phone] Route: Oral  
 insulin NPH (NOVOLIN N, HUMULIN N) 100 unit/mL injection 3 Sig: Inject 35 units twice a day for DM-2 (250.00)  Indications: type 2 diabetes mellitus Class: Normal  
 Pharmacy: [facility] [address] RAMY LAVARE[address] [address] Ph #: [phone] We Performed the Following AMB POC EKG ROUTINE W/ 12 LEADS, INTER & REP [44036 CPT(R)] HEMOGLOBIN A1C WITH EAG [77286 CPT(R)]  DIABETES FOOT EXAM [7 Custom] METABOLIC PANEL, BASIC [95889 CPT(R)] OCCULT BLOOD, IMMUNOASSAY (FIT) W509084 CPT(R)] TSH 3RD GENERATION [21148 CPT(R)] VITAMIN B12 [id number] CPT(R)] VITAMIN D, 25 HYDROXY [id number] CPT(R)] Follow-up Instructions Return in about 3 months (around 8/29/2018) for Routine. .  
  
  
Patient Instructions Colon Cancer Screening: Care Instructions Your Care Instructions Colorectal cancer occurs in the colon or rectum. That's the lower part of your digestive system. It is the second-leading cause of cancer deaths in the [place]. It often starts with small growths called polyps in the colon or rectum. Polyps are usually found with screening tests. Depending on the type of test, any polyps found may be removed during the tests. Colorectal cancer usually does not cause symptoms at first. But regular tests can help find it early, before it spreads and becomes harder to treat. Experts advise routine tests for colon cancer for people starting at age 48. And they advise people with a higher risk of colon cancer to get tested sooner. Talk with your doctor about when you should start testing. Discuss which tests you need. Follow-up care is a key part of your treatment and safety. Be sure to make and go to all appointments, and call your doctor if you are having problems. It's also a good idea to know your test results and keep a list of the medicines you take. What are the main screening tests for colon cancer? · Stool tests. These include the fecal immunochemical test (FIT) and the fecal occult blood test (FOBT). These tests check stool samples for signs of cancer. If your test is positive, you will need to have a colonoscopy. · Sigmoidoscopy. This test lets your doctor look at the lining of your rectum and the lowest part of your colon. Your doctor uses a lighted tube called a sigmoidoscope. This test can't find cancers or polyps in the upper part of your colon. In some cases, polyps that are found can be removed. But if your doctor finds polyps, you will need to have a colonoscopy to check the upper part of your colon. · Colonoscopy. This test lets your doctor look at the lining of your rectum and your entire colon. The doctor uses a thin, flexible tool called a colonoscope. It can also be used to remove polyps or get a tissue sample (biopsy). What tests do you need? The following guidelines are for people age 48 and over who are not at high risk for colorectal cancer. You may have at least one of these tests as directed by your doctor. · Fecal immunochemical test (FIT) or fecal occult blood test (FOBT) every year · Sigmoidoscopy every 5 years · Colonoscopy every 10 years If you are age 68 to 80, you can work with your doctor to decide if screening is a good option. If you are age 80 or older, your doctor will likely advise that screening is not helpful. Talk with your doctor about when you need to be tested. And discuss which tests are right for you. Your doctor may recommend earlier or more frequent testing if you: 
· Have had colorectal cancer before. · Have had colon polyps. · Have symptoms of colorectal cancer. These include blood in your stool and changes in your bowel habits. · Have a parent, brother or sister, or child with colon polyps or colorectal cancer. · Have a bowel disease. This includes ulcerative colitis and Crohn's disease. · Have a rare polyp syndrome that runs in families, such as familial adenomatous polyposis (FAP). · Have had radiation treatments to the belly or pelvis. When should you call for help? Watch closely for changes in your health, and be sure to contact your doctor if: 
? · You have any changes in your bowel habits. ? · You have any problems. Where can you learn more? Go to http://live-carmen.info/. Enter M541 in the search box to learn more about \"Colon Cancer Screening: Care Instructions. \" Current as of: May 12, 2017 Content Version: 11.4 © 1724-9321 [employer]. Care instructions adapted under license by [employer] (which disclaims liability or warranty for this information). If you have questions about a medical condition or this instruction, always ask your healthcare professional. [address] [address] any warranty or liability for your use of this information. Introducing hospitals & HEALTH SERVICES! [facility] introduces Leonar3Do patient portal. Now you can access parts of your medical record, email your doctor's office, and request medication refills online. 1. In your internet browser, go to [email or web address] 2. Click on the First Time User? Click Here link in the Sign In box. You will see the New Member Sign Up page. 3. Enter your Leonar3Do Access Code exactly as it appears below. You will not need to use this code after youve completed the sign-up process. If you do not sign up before the expiration date, you must request a new code. · Leonar3Do Access Code: [email or web address] Expires: 8/27/2018  8:51 AM 
 
 4. Enter the last four digits of your Social Security Number (xxxx) and Date of Birth (mm/dd/yyyy) as indicated and click Submit. You will be taken to the next sign-up page. 5. Create a DataParenting ID. This will be your DataParenting login ID and cannot be changed, so think of one that is secure and easy to remember. 6. Create a DataParenting password. You can change your password at any time. 7. Enter your Password Reset Question and Answer. This can be used at a later time if you forget your password. 8. Enter your e-mail address. You will receive e-mail notification when new information is available in [address] 9. Click Sign Up. You can now view and download portions of your medical record. 10. Click the Download Summary menu link to download a portable copy of your medical information. If you have questions, please visit the Frequently Asked Questions section of the DataParenting website. Remember, DataParenting is NOT to be used for urgent needs. For medical emergencies, dial 911. Now available from your iPhone and Android! Please provide this summary of care documentation to your next provider. If you have any questions after today's visit, please call [phone].

## 2018-05-29 NOTE — PROGRESS NOTES
aDndy Tsai  63 y.o. male  1955 [address] [facility] [place] [address]  [id number]     [id number]: Progress Note       Encounter Date: 5/29/2018    Chief Complaint   Patient presents with    Labs     Fasting    Diabetes       History provided by patient  History of Present Illness   Dandy Tsai is a 63 y.o. male who presents to clinic today for:    Diabetes Follow up: Uncontrolled   Overall the patient feels he is fatigued.     Diabetic Consultants:Endocrinologist - N/A   Therapy:oral agent (monotherapy): metformin (generic)  insulin injections: NPH: 35 units BID   Medication compliance:Good   Frequency of home glucose testing: weekly   Blood Sugar range at home: 140-180   Polyuria, polyphagia or polydipsia: NO   Dietary compliance: Fair   On ASA: Yes   Pertinent Labs:      Hemoglobin A1c   Date Value Ref Range Status   01/25/2018 10.1 (H) 4.8 - 5.6 % Final     Comment:              Pre-diabetes: 5.7 - 6.4           Diabetes: >6.4           Glycemic control for adults with diabetes: <7.0     01/31/2017 11.6 (H) 4.8 - 5.6 % Final     Comment:              Pre-diabetes: 5.7 - 6.4           Diabetes: >6.4           Glycemic control for adults with diabetes: <7.0     02/19/2016 9.3 (H) 4.8 - 5.6 % Final     Comment:              Pre-diabetes: 5.7 - 6.4           Diabetes: >6.4           Glycemic control for adults with diabetes: <7.0       Estimated average glucose   Date Value Ref Range Status   01/25/2018 243 mg/dL Final   01/31/2017 286 mg/dL Final     Cholesterol, total   Date Value Ref Range Status   01/25/2018 130 100 - 199 mg/dL Final     HDL Cholesterol   Date Value Ref Range Status   01/25/2018 40 >39 mg/dL Final        Wt Readings from Last 3 Encounters:   05/29/18 316 lb (143.3 kg)   01/25/18 324 lb (147 kg)   10/27/17 325 lb (147.4 kg)        History   Smoking Status    Former Smoker    Quit date: 6/1/1992   Smokeless Tobacco    Never Used        Hypertension: Stable   BP Readings from Last 3 Encounters:   05/29/18 133/85   01/25/18 (!) 160/93   10/27/17 142/90     The patient reports:  taking medications as instructed, no medication side effects noted, no TIA's, no chest pain on exertion, no dyspnea on exertion, no swelling of ankles. Sodium   Date Value Ref Range Status   01/25/2018 141 134 - 144 mmol/L Final     Potassium   Date Value Ref Range Status   01/25/2018 4.3 3.5 - 5.2 mmol/L Final     Chloride   Date Value Ref Range Status   01/25/2018 101 96 - 106 mmol/L Final     Creatinine   Date Value Ref Range Status   01/25/2018 0.98 0.76 - 1.27 mg/dL Final   10/27/2017 1.03 0.76 - 1.27 mg/dL Final   01/31/2017 1.00 0.76 - 1.27 mg/dL Final     GFR est AA   Date Value Ref Range Status   01/25/2018 96 >59 mL/min/1.73 Final   10/27/2017 91 >59 mL/min/1.73 Final   01/31/2017 95 >59 mL/min/1.73 Final     GFR est non-AA   Date Value Ref Range Status   01/25/2018 83 >59 mL/min/1.73 Final   10/27/2017 79 >59 mL/min/1.73 Final   01/31/2017 82 >59 mL/min/1.73 Final       Our goal is to normalize the blood pressure to decrease the risks of strokes and heart attacks. The patient is in agreement with the plan. Episode of sweating and \"feel like I could melt. \" Reports that BP and BS were good. Denies CP, SOB, palpations. Has had 3-4 episodes in the past 2-3 years. Health Maintenance  Request sent for Eye Exam.     Health Maintenance Due   Topic Date Due    Pneumococcal 19-64 Medium Risk (1 of 1 - PPSV23) 06/07/1976    DTaP/Tdap/Td series (1 - Tdap) 01/22/2011    FOBT Q 1 YEAR AGE 50-75  09/26/2015    ZOSTER VACCINE AGE 60>  04/07/2017    EYE EXAM RETINAL OR DILATED Q1  12/28/2017     Review of Systems   Review of Systems   Constitutional: Positive for diaphoresis and malaise/fatigue. Negative for chills, fever and weight loss. Respiratory: Negative for cough, sputum production and shortness of breath.     Cardiovascular: Negative for chest pain, palpitations, leg swelling and PND. Gastrointestinal: Negative for abdominal pain, constipation, diarrhea, nausea and vomiting. Skin: Negative for itching and rash. Neurological: Negative for dizziness, tingling, focal weakness, seizures and headaches. Vitals/Objective:     Vitals:    05/29/18 0905   BP: 133/85   Pulse: 75   Resp: 22   Temp: 97.9 °F (36.6 °C)   TempSrc: Oral   SpO2: 95%   Weight: 316 lb (143.3 kg)   Height: 6' 1\" (1.854 m)     Body mass index is 41.69 kg/(m^2). Wt Readings from Last 3 Encounters:   05/29/18 316 lb (143.3 kg)   01/25/18 324 lb (147 kg)   10/27/17 325 lb (147.4 kg)       Physical Exam   Constitutional: He is oriented to person, place, and time. He appears well-developed and well-nourished. HENT:   Head: Normocephalic and atraumatic. Eyes: EOM are normal. Pupils are equal, round, and reactive to light. Cardiovascular: Normal rate and regular rhythm. No murmur heard. Pulmonary/Chest: Effort normal and breath sounds normal. No respiratory distress. He has no wheezes. He has no rales. Musculoskeletal: He exhibits no edema. Neurological: He is alert and oriented to person, place, and time. Skin: No rash noted. No erythema. No pallor. Diabetic foot exam:     Left Foot:   Visual Exam: normal    Pulse DP: 1+ (weak)   Filament test: normal sensation    Vibratory sensation: normal       Right Foot:   Visual Exam: normal    Pulse DP: 1+ (weak)   Filament test: normal sensation    Vibratory sensation: normal    No results found for this or any previous visit (from the past 24 hour(s)). Assessment and Plan:     Encounter Diagnoses     ICD-10-CM ICD-9-CM   1. Uncontrolled type 2 diabetes mellitus with diabetic nephropathy, with long-term current use of insulin ([facility]) E11.21 250.42    E11.65 583.81    Z79.4 V58.67   2. Essential hypertension I10 401.9   3. Abnormal flushing and sweating R23.2 782.62   4. Obesity, morbid ([address] E66.01 278.01   5. Fatigue, unspecified type R53.83 780.79   6. Screening for malignant neoplasm of colon Z12.11 V76.51       1. Uncontrolled type 2 diabetes mellitus with diabetic nephropathy, with long-term current use of insulin ([facility]  Recheck blood work. Per patient, level have been much improved. - metFORMIN (GLUCOPHAGE) 1,000 mg tablet; Take 1 Tab by mouth two (2) times daily (with meals). Dispense: 180 Tab; Refill: 1  - insulin NPH (NOVOLIN N, HUMULIN N) 100 unit/mL injection; Inject 35 units twice a day for DM-2 (250.00)  Indications: type 2 diabetes mellitus  Dispense: 3 Vial; Refill: 3  - HM DIABETES FOOT EXAM  - HEMOGLOBIN A1C WITH EAG  - METABOLIC PANEL, BASIC  - VITAMIN D, 25 HYDROXY  - VITAMIN B12  - FUNDUS PHOTOGRAPHY    2. Essential hypertension  3. Abnormal flushing and sweating  4. Obesity, morbid ([address]  5. Fatigue, unspecified type  I personally reviewed the EKG and noted NSR, no acute changes or abnormalities. Will check vitamin levels, TSH, and sent referral to [facility] to screen for JOHANA. - AMB POC EKG ROUTINE W/ 12 LEADS, INTER & REP  - TSH 3RD GENERATION    6. Screening for malignant neoplasm of colon  - OCCULT BLOOD, IMMUNOASSAY (FIT)    I have discussed the diagnosis with the patient and the intended plan as seen in the above orders. he has expressed understanding. The patient has received an after-visit summary and questions were answered concerning future plans. I have discussed medication side effects and warnings with the patient as well. Electronically Signed: Yoly Hendrix MD     History/Allergies   Patients past medical, surgical and family histories were reviewed and updated.     Past Medical History:   Diagnosis Date    Hypercholesterolemia     Hypertension       Past Surgical History:   Procedure Laterality Date    HX ORTHOPAEDIC  2004    rt shoulder surgery    HX ORTHOPAEDIC      fx lft arm     Family History   Problem Relation Age of Onset    Diabetes Father     Hypertension Father      Social History     Social History  Marital status:      Spouse name: N/A    Number of children: N/A    Years of education: N/A     Occupational History    Not on file. Social History Main Topics    Smoking status: Former Smoker     Quit date: 6/1/1992    Smokeless tobacco: Never Used    Alcohol use No    Drug use: No    Sexual activity: Yes     Partners: Female     Other Topics Concern    Not on file     Social History Narrative         Allergies   Allergen Reactions    Cyclobenzaprine Other (comments)     Leg cramps    Percocet [Oxycodone-Acetaminophen] Nausea Only       Disposition     Follow-up Disposition:  Return in about 3 months (around 8/29/2018) for Routine. .    Future Appointments  Date Time Provider Shay Breanna   7/2/2018 8:00 AM [facility]            Current Medications after this visit     Current Outpatient Prescriptions   Medication Sig    metFORMIN (GLUCOPHAGE) 1,000 mg tablet Take 1 Tab by mouth two (2) times daily (with meals).  insulin NPH (NOVOLIN N, HUMULIN N) 100 unit/mL injection Inject 35 units twice a day for DM-2 (250.00)  Indications: type 2 diabetes mellitus    metoprolol tartrate (LOPRESSOR) 50 mg tablet Take 1 Tab by mouth two (2) times a day.  nicotinic acid (NIACIN) 500 mg tablet Take 500 mg by mouth Daily (before breakfast).  lisinopril-hydroCHLOROthiazide (PRINZIDE, ZESTORETIC) 20-25 mg per tablet Take 1 Tab by mouth daily.  verapamil ER (CALAN-SR) 180 mg CR tablet Take 1 Tab by mouth nightly.  pravastatin (PRAVACHOL) 40 mg tablet Take 1 Tab by mouth nightly.  insulin syringe-needle U-100 Dispense microfine 1 mL syringes with 30 gauge needle. Inject 23 units NPH twice a day for DM-2 (250.00)    Insulin Needles, Disposable, 31 X 5/16 \" ndle Inject hs for DM-2 250.00    glucose blood VI test strips (BLOOD GLUCOSE TEST) strip Test TID for DM-2 treated with insulin.  (Patient taking differently: Test once a day for DM-2 treated with insulin.)    omega-3 fatty acids-vitamin e (FISH OIL) 1,000 mg cap Take 2 Caps by mouth two (2) times a day.  aspirin (ASPIRIN) 325 mg tablet Take 1 Tab by mouth daily.  Lancets misc Test TID for DM-2 treated with insulin (250.00)     No current facility-administered medications for this visit.       Medications Discontinued During This Encounter   Medication Reason    metFORMIN (GLUCOPHAGE) 1,000 mg tablet Reorder    insulin NPH (NOVOLIN N, HUMULIN N) 100 unit/mL injection Reorder

## 2018-05-29 NOTE — PROGRESS NOTES
Chief Complaint   Patient presents with    Labs     Fasting    Diabetes   . Body mass index is 41.69 kg/(m^2). 1. Have you been to the ER, urgent care clinic since your last visit? Hospitalized since your last visit? No    2. Have you seen or consulted any other health care providers outside of the [address] [place] [address] since your last visit? Include any pap smears or colon screening. No    Reviewed record in preparation for visit and have necessary documentation  Pt did not bring medication to office visit for review  Information was given to pt on Advanced Directives, Living Will  Opportunity was given for questions  Goals that were addressed and/or need to be completed after this appointment include:     Health Maintenance Due   Topic Date Due    Pneumococcal 19-64 Medium Risk (1 of 1 - PPSV23) 06/07/1976    DTaP/Tdap/Td series (1 - Tdap) 01/22/2011    FOBT Q 1 YEAR AGE 50-75  09/26/2015    ZOSTER VACCINE AGE 60>  04/07/2017    EYE EXAM RETINAL OR DILATED Q1  12/28/2017     - check for functional glucose monitor and record keeping system: Yes, Checks sugars once a week  Pt was given BS record log to document home readings and return to office for review  Diabetic Bundle:  LDL- 67  A1C- 10.1  BP- 133/85  Smoking? No  Anticoagulation medication? Yes  Eye exam dilated?  Good -- [facility]  Foot exam? Due

## 2018-05-29 NOTE — PATIENT INSTRUCTIONS
Colon Cancer Screening: Care Instructions  Your Care Instructions    Colorectal cancer occurs in the colon or rectum. That's the lower part of your digestive system. It is the second-leading cause of cancer deaths in the [place]. It often starts with small growths called polyps in the colon or rectum. Polyps are usually found with screening tests. Depending on the type of test, any polyps found may be removed during the tests. Colorectal cancer usually does not cause symptoms at first. But regular tests can help find it early, before it spreads and becomes harder to treat. Experts advise routine tests for colon cancer for people starting at age 48. And they advise people with a higher risk of colon cancer to get tested sooner. Talk with your doctor about when you should start testing. Discuss which tests you need. Follow-up care is a key part of your treatment and safety. Be sure to make and go to all appointments, and call your doctor if you are having problems. It's also a good idea to know your test results and keep a list of the medicines you take. What are the main screening tests for colon cancer? · Stool tests. These include the fecal immunochemical test (FIT) and the fecal occult blood test (FOBT). These tests check stool samples for signs of cancer. If your test is positive, you will need to have a colonoscopy. · Sigmoidoscopy. This test lets your doctor look at the lining of your rectum and the lowest part of your colon. Your doctor uses a lighted tube called a sigmoidoscope. This test can't find cancers or polyps in the upper part of your colon. In some cases, polyps that are found can be removed. But if your doctor finds polyps, you will need to have a colonoscopy to check the upper part of your colon. · Colonoscopy. This test lets your doctor look at the lining of your rectum and your entire colon. The doctor uses a thin, flexible tool called a colonoscope.  It can also be used to remove polyps or get a tissue sample (biopsy). What tests do you need? The following guidelines are for people age 48 and over who are not at high risk for colorectal cancer. You may have at least one of these tests as directed by your doctor. · Fecal immunochemical test (FIT) or fecal occult blood test (FOBT) every year  · Sigmoidoscopy every 5 years  · Colonoscopy every 10 years  If you are age 68 to 80, you can work with your doctor to decide if screening is a good option. If you are age 80 or older, your doctor will likely advise that screening is not helpful. Talk with your doctor about when you need to be tested. And discuss which tests are right for you. Your doctor may recommend earlier or more frequent testing if you:  · Have had colorectal cancer before. · Have had colon polyps. · Have symptoms of colorectal cancer. These include blood in your stool and changes in your bowel habits. · Have a parent, brother or sister, or child with colon polyps or colorectal cancer. · Have a bowel disease. This includes ulcerative colitis and Crohn's disease. · Have a rare polyp syndrome that runs in families, such as familial adenomatous polyposis (FAP). · Have had radiation treatments to the belly or pelvis. When should you call for help? Watch closely for changes in your health, and be sure to contact your doctor if:  ? · You have any changes in your bowel habits. ? · You have any problems. Where can you learn more? Go to http://live-carmen.info/. Enter M541 in the search box to learn more about \"Colon Cancer Screening: Care Instructions. \"  Current as of: May 12, 2017  Content Version: 11.4  © 4948-4611 [employer]. Care instructions adapted under license by [employer] (which disclaims liability or warranty for this information).  If you have questions about a medical condition or this instruction, always ask your healthcare professional. Alisa Laura disclaims any warranty or liability for your use of this information.

## 2018-05-30 LAB
25(OH)D3+25(OH)D2 SERPL-MCNC: 28.1 NG/ML (ref 30–100)
BUN SERPL-MCNC: 24 MG/DL (ref 8–27)
BUN/CREAT SERPL: 22 (ref 10–24)
CALCIUM SERPL-MCNC: 9.7 MG/DL (ref 8.6–10.2)
CHLORIDE SERPL-SCNC: 101 MMOL/L (ref 96–106)
CO2 SERPL-SCNC: 22 MMOL/L (ref 18–29)
CREAT SERPL-MCNC: 1.07 MG/DL (ref 0.76–1.27)
EST. AVERAGE GLUCOSE BLD GHB EST-MCNC: 240 MG/DL
GFR SERPLBLD CREATININE-BSD FMLA CKD-EPI: 75 ML/MIN/1.73
GFR SERPLBLD CREATININE-BSD FMLA CKD-EPI: 87 ML/MIN/1.73
GLUCOSE SERPL-MCNC: 179 MG/DL (ref 65–99)
HBA1C MFR BLD: 10 % (ref 4.8–5.6)
POTASSIUM SERPL-SCNC: 5 MMOL/L (ref 3.5–5.2)
SODIUM SERPL-SCNC: 139 MMOL/L (ref 134–144)
TSH SERPL DL<=0.005 MIU/L-ACNC: 0.56 UIU/ML (ref 0.45–4.5)
VIT B12 SERPL-MCNC: 399 PG/ML (ref 232–1245)

## 2018-05-31 ENCOUNTER — TELEPHONE (OUTPATIENT)
Dept: FAMILY MEDICINE CLINIC | Age: 63
End: 2018-05-31

## 2018-05-31 NOTE — TELEPHONE ENCOUNTER
I have reviewed the patient's latest blood work. \"Electrolyte and kidney function are normal. The vitamin D level is low and that can be supplemented with over the counter Vitamin D3 1,000 international units once a day. The hemoglobin A1c or average sugar is still elevated however. Please keep a log of your blood sugars mornings and 2 hours after eating.  Bring this log to an earlier appointment at the end of next month to evaluate your control\"    Sherwin Patel MD

## 2018-05-31 NOTE — TELEPHONE ENCOUNTER
Advised of lab results. Patient verbalized understanding. Offered earlier appointment for the end of June but patient states he already has appointment scheduled for July 2 and would like to keep that appointment.

## 2018-06-30 LAB — HEMOCCULT STL QL IA: NEGATIVE

## 2018-07-02 ENCOUNTER — OFFICE VISIT (OUTPATIENT)
Dept: FAMILY MEDICINE CLINIC | Age: 63
End: 2018-07-02

## 2018-07-02 VITALS
HEART RATE: 78 BPM | BODY MASS INDEX: 41.75 KG/M2 | WEIGHT: 315 LBS | DIASTOLIC BLOOD PRESSURE: 82 MMHG | HEIGHT: 73 IN | TEMPERATURE: 98.2 F | RESPIRATION RATE: 18 BRPM | SYSTOLIC BLOOD PRESSURE: 136 MMHG | OXYGEN SATURATION: 97 %

## 2018-07-02 DIAGNOSIS — I10 ESSENTIAL HYPERTENSION: ICD-10-CM

## 2018-07-02 DIAGNOSIS — E55.9 VITAMIN D DEFICIENCY: ICD-10-CM

## 2018-07-02 DIAGNOSIS — E78.5 HYPERLIPIDEMIA, UNSPECIFIED HYPERLIPIDEMIA TYPE: ICD-10-CM

## 2018-07-02 RX ORDER — GLIPIZIDE 5 MG/1
5 TABLET ORAL 2 TIMES DAILY
Qty: 30 TAB | Refills: 0 | Status: SHIPPED | OUTPATIENT
Start: 2018-07-02 | End: 2018-10-08 | Stop reason: DRUGHIGH

## 2018-07-02 RX ORDER — GLIPIZIDE 10 MG/1
10 TABLET ORAL 2 TIMES DAILY
Qty: 60 TAB | Refills: 2 | Status: SHIPPED | OUTPATIENT
Start: 2018-07-16 | End: 2018-10-08 | Stop reason: SDUPTHER

## 2018-07-02 RX ORDER — MELATONIN
1000 DAILY
Qty: 30 TAB | Refills: 3 | COMMUNITY
Start: 2018-07-02 | End: 2021-03-04

## 2018-07-02 NOTE — PROGRESS NOTES
Zafar King  66 y.o. male  1955  [address] [facility] [place] [address] [facility]  [id number]     [id number]: Progress Note       Encounter Date: 7/2/2018    Chief Complaint   Patient presents with    Diabetes    Cholesterol Problem    Hypertension       History provided by patient  History of Present Illness   Zafar King is a 66 y.o. male who presents to clinic today for:    Diabetes Follow up: Uncontrolled   Overall the patient feels he is fine. He has not noticed a difference in his blood sugar.    Diabetic Consultants:Endocrinologist - N/A   Therapy:oral agent (monotherapy): metformin (generic)  insulin injections: NPH: 35 units   Medication compliance:Good   Frequency of home glucose testing: Daily   Blood Sugar range at home: 140-200   Polyuria, polyphagia or polydipsia: NO   Retinopathy: NO   Neuropathy SX: NO   Low blood sugar symptoms: No   Dietary compliance: Fair   On ASA: Yes   Pertinent Labs:      Hemoglobin A1c   Date Value Ref Range Status   05/29/2018 10.0 (H) 4.8 - 5.6 % Final     Comment:              Pre-diabetes: 5.7 - 6.4           Diabetes: >6.4           Glycemic control for adults with diabetes: <7.0     01/25/2018 10.1 (H) 4.8 - 5.6 % Final     Comment:              Pre-diabetes: 5.7 - 6.4           Diabetes: >6.4           Glycemic control for adults with diabetes: <7.0     01/31/2017 11.6 (H) 4.8 - 5.6 % Final     Comment:              Pre-diabetes: 5.7 - 6.4           Diabetes: >6.4           Glycemic control for adults with diabetes: <7.0       Estimated average glucose   Date Value Ref Range Status   05/29/2018 240 mg/dL Final   01/25/2018 243 mg/dL Final   01/31/2017 286 mg/dL Final     Cholesterol, total   Date Value Ref Range Status   01/25/2018 130 100 - 199 mg/dL Final     HDL Cholesterol   Date Value Ref Range Status   01/25/2018 40 >39 mg/dL Final        Wt Readings from Last 3 Encounters:   07/02/18 319 lb (144.7 kg)   05/29/18 316 lb (143.3 kg)   01/25/18 324 lb (147 kg)        History   Smoking Status    Former Smoker    Quit date: 6/1/1992   Smokeless Tobacco    Never Used          Hypertension: Controlled   BP Readings from Last 3 Encounters:   07/02/18 136/82   05/29/18 133/85   01/25/18 (!) 160/93     The patient reports:  taking medications as instructed, no medication side effects noted, no TIA's, no chest pain on exertion, no dyspnea on exertion, no swelling of ankles. Sodium   Date Value Ref Range Status   05/29/2018 139 134 - 144 mmol/L Final     Potassium   Date Value Ref Range Status   05/29/2018 5.0 3.5 - 5.2 mmol/L Final     Chloride   Date Value Ref Range Status   05/29/2018 101 96 - 106 mmol/L Final     Creatinine   Date Value Ref Range Status   05/29/2018 1.07 0.76 - 1.27 mg/dL Final   01/25/2018 0.98 0.76 - 1.27 mg/dL Final   10/27/2017 1.03 0.76 - 1.27 mg/dL Final     GFR est AA   Date Value Ref Range Status   05/29/2018 87 >59 mL/min/1.73 Final   01/25/2018 96 >59 mL/min/1.73 Final   10/27/2017 91 >59 mL/min/1.73 Final     GFR est non-AA   Date Value Ref Range Status   05/29/2018 75 >59 mL/min/1.73 Final   01/25/2018 83 >59 mL/min/1.73 Final   10/27/2017 79 >59 mL/min/1.73 Final       Our goal is to normalize the blood pressure to decrease the risks of strokes and heart attacks. The patient is in agreement with the plan. Hyperlipidemia:  Improved  Cardiovascular risks for him are: diabetic  hypertension  hyperlipidemia  obese  sedentary lifestyle.    Currently he takes Pravachol (pravastatin)   Myalgias: No  Cholesterol, total   Date Value Ref Range Status   01/25/2018 130 100 - 199 mg/dL Final   10/27/2017 176 100 - 199 mg/dL Final   01/31/2017 163 100 - 199 mg/dL Final     HDL Cholesterol   Date Value Ref Range Status   01/25/2018 40 >39 mg/dL Final   10/27/2017 44 >39 mg/dL Final   01/31/2017 41 >39 mg/dL Final     Triglyceride   Date Value Ref Range Status   01/25/2018 113 0 - 149 mg/dL Final   10/27/2017 155 (H) 0 - 149 mg/dL Final 01/31/2017 142 0 - 149 mg/dL Final     ALT (SGPT)   Date Value Ref Range Status   10/27/2017 35 0 - 44 IU/L Final     AST (SGOT)   Date Value Ref Range Status   10/27/2017 17 0 - 40 IU/L Final     Alk. phosphatase   Date Value Ref Range Status   10/27/2017 59 39 - 117 IU/L Final       Wt Readings from Last 3 Encounters:   07/02/18 319 lb (144.7 kg)   05/29/18 316 lb (143.3 kg)   01/25/18 324 lb (147 kg)     Vitamin D Deficiency:Improved  Patient is currently taking Vitamin D3 1,000 units. Report that he is feeling well on current supplementation. He states that he is not at 100% yet but iverall is much improved. Vitamin D 25-Hydroxy   Date Value Ref Range Status   06/15/2011 29.5 (L) 32.0 - 100.0 ng/mL Final     Comment:     Recent studies consider the lower limit of 32.0 ng/mL to be a  threshold for optimal health. Dillon PADILLA. J Nutr. 2005 Feb;135(2):317-22. Performed At: [address]  [id number]  Maikel Duncan MD  [id number]     VITAMIN D, 25-HYDROXY   Date Value Ref Range Status   05/29/2018 28.1 (L) 30.0 - 100.0 ng/mL Final     Comment:     Vitamin D deficiency has been defined by the [place] of  Medicine and an Endocrine Society practice guideline as a  level of serum 25-OH vitamin D less than 20 ng/mL (1,2). The Endocrine Society went on to further define vitamin D  insufficiency as a level between 21 and 29 ng/mL (2). 1. IOM ([place] of Medicine). 2010. Dietary reference     intakes for calcium and D. [facility]: The     Protonet. 2. Valentina MF, Bernice NC, Natalia-Abe WORTHINGTON, et al.     Evaluation, treatment, and prevention of vitamin D     deficiency: an Endocrine Society clinical practice     guideline. JCEM.  2011 Jul; 96(7):1911-30.     01/05/2015 21.0 (L) 30.0 - 100.0 ng/mL Final     Comment:     Vitamin D deficiency has been defined by the [address] [place] [address] practice guideline as a  level of serum 25-OH vitamin D less than 20 ng/mL (1,2). The Endocrine Society went on to further define vitamin D  insufficiency as a level between 21 and 29 ng/mL (2). 1. IOM ([place] of Medicine). 2010. Dietary reference     intakes for calcium and D. [facility]: The     [employer]. 2. Valentina MF, Bernice NC, Aristeo WORTHINGTON, et al.     Evaluation, treatment, and prevention of vitamin D     deficiency: an Endocrine Society clinical practice     guideline. JCEM. 2011 Jul; 96(7):1911-30. Health Maintenance  Patient followed by Eye doctor was seen several months ago. '    Health Maintenance Due   Topic Date Due    Pneumococcal 19-64 Medium Risk (1 of 1 - PPSV23) 06/07/1976    DTaP/Tdap/Td series (1 - Tdap) 01/22/2011    ZOSTER VACCINE AGE 60>  04/07/2017    EYE EXAM RETINAL OR DILATED Q1  12/28/2017     Review of Systems   Review of Systems   Constitutional: Negative for chills, diaphoresis, fever, malaise/fatigue and weight loss. Respiratory: Negative for cough, sputum production and shortness of breath. Cardiovascular: Negative for chest pain, palpitations, leg swelling and PND. Gastrointestinal: Negative for abdominal pain, constipation, diarrhea, nausea and vomiting. Skin: Negative for itching and rash. Neurological: Negative for dizziness, tingling, focal weakness, seizures and headaches. Vitals/Objective:     Vitals:    07/02/18 0801 07/02/18 0817   BP: 144/82 136/82   Pulse: 78 78   Resp: 18    Temp: 98.2 °F (36.8 °C)    TempSrc: Oral    SpO2: 97%    Weight: 319 lb (144.7 kg)    Height: 6' 1\" (1.854 m)      Body mass index is 42.09 kg/(m^2). Wt Readings from Last 3 Encounters:   07/02/18 319 lb (144.7 kg)   05/29/18 316 lb (143.3 kg)   01/25/18 324 lb (147 kg)       Physical Exam   Constitutional: He is oriented to person, place, and time. He appears well-developed and well-nourished. HENT:   Head: Normocephalic and atraumatic.    Eyes: EOM are normal. Pupils are equal, round, and reactive to light. Cardiovascular: Normal rate and regular rhythm. No murmur heard. Pulmonary/Chest: Effort normal and breath sounds normal. No respiratory distress. He has no wheezes. He has no rales. Musculoskeletal: He exhibits no edema. Neurological: He is alert and oriented to person, place, and time. Skin: No rash noted. No erythema. No pallor. No results found for this or any previous visit (from the past 24 hour(s)). Assessment and Plan:     Encounter Diagnoses     ICD-10-CM ICD-9-CM   1. Uncontrolled type 2 diabetes mellitus with diabetic nephropathy, with long-term current use of insulin ([facility]) E11.21 250.42    E11.65 583.81    Z79.4 V58.67   2. Essential hypertension I10 401.9   3. Hyperlipidemia, unspecified hyperlipidemia type E78.5 272.4   4. Vitamin D deficiency E55.9 268.9       1. Uncontrolled type 2 diabetes mellitus with diabetic nephropathy, with long-term current use of insulin ([facility].)  Due to patient's financial situation, choice of medications is limited. Will start a second oral agent and titrate up to full dose of glipizide.   - glipiZIDE (GLUCOTROL) 5 mg tablet; Take 1 Tab by mouth two (2) times a day. Dispense: 30 Tab; Refill: 0  - glipiZIDE (GLUCOTROL) 10 mg tablet; Take 1 Tab by mouth two (2) times a day. Dispense: 60 Tab; Refill: 2    2. Essential hypertension  At goal. Tolerating medications well. 3. Hyperlipidemia, unspecified hyperlipidemia type  Continue statin. 4. Vitamin D deficiency  Patient doing well on supplementation. - cholecalciferol (VITAMIN D3) 1,000 unit tablet; Take 1 Tab by mouth daily. Indications: Vitamin D Deficiency  Dispense: 30 Tab; Refill: 3    I have discussed the diagnosis with the patient and the intended plan as seen in the above orders. he has expressed understanding. The patient has received an after-visit summary and questions were answered concerning future plans.   I have discussed medication side effects and warnings with the patient as well. Electronically Signed: Rosa Wheatley MD     History/Allergies   Patients past medical, surgical and family histories were reviewed and updated. Past Medical History:   Diagnosis Date    Hypercholesterolemia     Hypertension       Past Surgical History:   Procedure Laterality Date    HX ORTHOPAEDIC  2004    rt shoulder surgery    HX ORTHOPAEDIC      fx lft arm     Family History   Problem Relation Age of Onset    Diabetes Father     Hypertension Father      Social History     Social History    Marital status:      Spouse name: N/A    Number of children: N/A    Years of education: N/A     Occupational History    Not on file. Social History Main Topics    Smoking status: Former Smoker     Quit date: 6/1/1992    Smokeless tobacco: Never Used    Alcohol use No    Drug use: No    Sexual activity: Yes     Partners: Female     Other Topics Concern    Not on file     Social History Narrative         Allergies   Allergen Reactions    Cyclobenzaprine Other (comments)     Leg cramps    Percocet [Oxycodone-Acetaminophen] Nausea Only       Disposition     Follow-up Disposition:  Return in about 3 months (around 10/2/2018) for Routine. .    Future Appointments  Date Time Provider Shay Balesisti   10/2/2018 8:00 AM Rosa Wheatley MD [facility] SCHED            Current Medications after this visit     Current Outpatient Prescriptions   Medication Sig    glipiZIDE (GLUCOTROL) 5 mg tablet Take 1 Tab by mouth two (2) times a day.  [START ON 7/16/2018] glipiZIDE (GLUCOTROL) 10 mg tablet Take 1 Tab by mouth two (2) times a day.  cholecalciferol (VITAMIN D3) 1,000 unit tablet Take 1 Tab by mouth daily. Indications: Vitamin D Deficiency    metFORMIN (GLUCOPHAGE) 1,000 mg tablet Take 1 Tab by mouth two (2) times daily (with meals).     insulin NPH (NOVOLIN N, HUMULIN N) 100 unit/mL injection Inject 35 units twice a day for DM-2 (250.00) Indications: type 2 diabetes mellitus    metoprolol tartrate (LOPRESSOR) 50 mg tablet Take 1 Tab by mouth two (2) times a day.  nicotinic acid (NIACIN) 500 mg tablet Take 500 mg by mouth Daily (before breakfast).  lisinopril-hydroCHLOROthiazide (PRINZIDE, ZESTORETIC) 20-25 mg per tablet Take 1 Tab by mouth daily.  verapamil ER (CALAN-SR) 180 mg CR tablet Take 1 Tab by mouth nightly.  pravastatin (PRAVACHOL) 40 mg tablet Take 1 Tab by mouth nightly.  insulin syringe-needle U-100 Dispense microfine 1 mL syringes with 30 gauge needle. Inject 23 units NPH twice a day for DM-2 (250.00)    Insulin Needles, Disposable, 31 X 5/16 \" ndle Inject hs for DM-2 250.00    glucose blood VI test strips (BLOOD GLUCOSE TEST) strip Test TID for DM-2 treated with insulin. (Patient taking differently: Test once a day for DM-2 treated with insulin.)    Lancets misc Test TID for DM-2 treated with insulin (250.00)    omega-3 fatty acids-vitamin e (FISH OIL) 1,000 mg cap Take 2 Caps by mouth two (2) times a day.  aspirin (ASPIRIN) 325 mg tablet Take 1 Tab by mouth daily. No current facility-administered medications for this visit. There are no discontinued medications.

## 2018-07-02 NOTE — PROGRESS NOTES
1. Have you been to the ER, urgent care clinic, or been hospitalized since your last visit? No     2. Have you seen or consulted any other health care providers outside of the [address] [place] [address] since your last visit?  No     Reviewed record in preparation for visit and have necessary documentation  opportunity was given for questions  Goals that were addressed and/or need to be completed during or after this appointment include     Health Maintenance Due   Topic Date Due    Pneumococcal 19-64 Medium Risk (1 of 1 - PPSV23) 06/07/1976    DTaP/Tdap/Td series (1 - Tdap) 01/22/2011    ZOSTER VACCINE AGE 60>  04/07/2017    EYE EXAM RETINAL OR DILATED Q1  12/28/2017

## 2018-07-02 NOTE — PATIENT INSTRUCTIONS

## 2018-07-02 NOTE — MR AVS SNAPSHOT
[address] [address] 
[phone] Patient: Олег Soriano MRN: [id number] CDU:3/6/0218 Visit Information Date & Time Provider Department Dept. Phone Encounter #  
 7/2/2018  8:00 AM Emily Hooper  [facility] [phone] [id number] Follow-up Instructions Return in about 3 months (around 10/2/2018) for Routine. [facility] Upcoming Health Maintenance Date Due Pneumococcal 19-64 Medium Risk (1 of 1 - PPSV23) 6/7/1976 DTaP/Tdap/Td series (1 - Tdap) 1/22/2011 ZOSTER VACCINE AGE 60> 4/7/2017 EYE EXAM RETINAL OR DILATED Q1 12/28/2017 HEMOGLOBIN A1C Q6M 11/29/2018 MICROALBUMIN Q1 1/25/2019 LIPID PANEL Q1 1/25/2019 FOOT EXAM Q1 5/29/2019 FOBT Q 1 YEAR AGE 50-75 6/25/2019 Allergies as of 7/2/2018  Review Complete On: 7/2/2018 By: Emily Hooper MD  
  
 Severity Noted Reaction Type Reactions Cyclobenzaprine  09/26/2014   Side Effect Other (comments) Leg cramps Percocet [Oxycodone-acetaminophen]  02/25/2010    Nausea Only Current Immunizations  Reviewed on 12/22/2010 Name Date Influenza Vaccine Split 12/22/2010 TD Vaccine 1/21/2011 Not reviewed this visit You Were Diagnosed With   
  
 Codes Comments Uncontrolled type 2 diabetes mellitus with diabetic nephropathy, with long-term current use of insulin ([facility]    -  Primary ICD-10-CM: E11.21, E11.65, Z79.4 ICD-9-CM: 250.42, 583.81, V58.67 Essential hypertension     ICD-10-CM: I10 
ICD-9-CM: 401.9 Hyperlipidemia, unspecified hyperlipidemia type     ICD-10-CM: E78.5 ICD-9-CM: 272.4 Vitals BP Pulse Temp Resp Height(growth percentile) Weight(growth percentile) 136/82 78 98.2 °F (36.8 °C) (Oral) 18 6' 1\" (1.854 m) 319 lb (144.7 kg) SpO2 BMI Smoking Status 97% 42.09 kg/m2 Former Smoker Vitals History BMI and BSA Data Body Mass Index Body Surface Area 42.09 kg/m 2 2.73 m 2 Preferred Pharmacy Pharmacy Name Phone [address] [place] [address] [phone] Your Updated Medication List  
  
   
This list is accurate as of 7/2/18  8:18 AM.  Always use your most recent med list.  
  
  
  
  
 aspirin 325 mg tablet Commonly known as:  ASPIRIN Take 1 Tab by mouth daily. * glipiZIDE 5 mg tablet Commonly known as:  Lidia Shoe Take 1 Tab by mouth two (2) times a day. * glipiZIDE 10 mg tablet Commonly known as:  Lidia Shoe Take 1 Tab by mouth two (2) times a day. Start taking on:  7/16/2018  
  
 glucose blood VI test strips strip Commonly known as:  blood glucose test  
Test TID for DM-2 treated with insulin. Insulin Needles (Disposable) 31 gauge x 5/16\" Ndle Inject hs for DM-2 250.00  
  
 insulin  unit/mL injection Commonly known as:  Cody Saras Inject 35 units twice a day for DM-2 (250.00)  Indications: type 2 diabetes mellitus  
  
 insulin syringe-needle U-100 Dispense microfine 1 mL syringes with 30 gauge needle. Inject 23 units NPH twice a day for DM-2 (250.00) Lancets Misc Test TID for DM-2 treated with insulin (250.00)  
  
 lisinopril-hydroCHLOROthiazide 20-25 mg per tablet Commonly known as:  Floyce Plenty Take 1 Tab by mouth daily. metFORMIN 1,000 mg tablet Commonly known as:  GLUCOPHAGE Take 1 Tab by mouth two (2) times daily (with meals). metoprolol tartrate 50 mg tablet Commonly known as:  LOPRESSOR Take 1 Tab by mouth two (2) times a day. nicotinic acid 500 mg tablet Commonly known as:  NIACIN Take 500 mg by mouth Daily (before breakfast). omega-3 fatty acids-vitamin e 1,000 mg Cap Commonly known as:  FISH OIL Take 2 Caps by mouth two (2) times a day. pravastatin 40 mg tablet Commonly known as:  PRAVACHOL Take 1 Tab by mouth nightly. verapamil  mg CR tablet Commonly known as:  CALAN-SR Take 1 Tab by mouth nightly. * Notice: This list has 2 medication(s) that are the same as other medications prescribed for you. Read the directions carefully, and ask your doctor or other care provider to review them with you. Prescriptions Sent to Pharmacy Refills  
 glipiZIDE (GLUCOTROL) 5 mg tablet 0 Sig: Take 1 Tab by mouth two (2) times a day. Class: Normal  
 Pharmacy: [facility] [address] [address] Ph #: [phone] Route: Oral  
 glipiZIDE (GLUCOTROL) 10 mg tablet 2 Starting on: 7/16/2018 Sig: Take 1 Tab by mouth two (2) times a day. Class: Normal  
 Pharmacy: [facility] [address] [address] Ph #: [phone] Route: Oral  
  
Follow-up Instructions Return in about 3 months (around 10/2/2018) for Routine. .  
  
  
Patient Instructions Learning About Diabetes Food Guidelines Your Care Instructions Meal planning is important to manage diabetes. It helps keep your blood sugar at a target level (which you set with your doctor). You don't have to eat special foods. You can eat what your family eats, including sweets once in a while. But you do have to pay attention to how often you eat and how much you eat of certain foods. You may want to work with a dietitian or a certified diabetes educator (CDE) to help you plan meals and snacks. A dietitian or CDE can also help you lose weight if that is one of your goals. What should you know about eating carbs? Managing the amount of carbohydrate (carbs) you eat is an important part of healthy meals when you have diabetes. Carbohydrate is found in many foods. · Learn which foods have carbs. And learn the amounts of carbs in different foods. ¨ Bread, cereal, pasta, and rice have about 15 grams of carbs in a serving.  A serving is 1 slice of bread (1 ounce), ½ cup of cooked cereal, or 1/3 cup of cooked pasta or rice. ¨ Fruits have 15 grams of carbs in a serving. A serving is 1 small fresh fruit, such as an apple or orange; ½ of a banana; ½ cup of cooked or canned fruit; ½ cup of fruit juice; 1 cup of melon or raspberries; or 2 tablespoons of dried fruit. ¨ Milk and no-sugar-added yogurt have 15 grams of carbs in a serving. A serving is 1 cup of milk or 2/3 cup of no-sugar-added yogurt. ¨ Starchy vegetables have 15 grams of carbs in a serving. A serving is ½ cup of mashed potatoes or sweet potato; 1 cup winter squash; ½ of a small baked potato; ½ cup of cooked beans; or ½ cup cooked corn or green peas. · Learn how much carbs to eat each day and at each meal. A dietitian or CDE can teach you how to keep track of the amount of carbs you eat. This is called carbohydrate counting. · If you are not sure how to count carbohydrate grams, use the Plate Method to plan meals. It is a good, quick way to make sure that you have a balanced meal. It also helps you spread carbs throughout the day. ¨ Divide your plate by types of foods. Put non-starchy vegetables on half the plate, meat or other protein food on one-quarter of the plate, and a grain or starchy vegetable in the final quarter of the plate. To this you can add a small piece of fruit and 1 cup of milk or yogurt, depending on how many carbs you are supposed to eat at a meal. 
· Try to eat about the same amount of carbs at each meal. Do not \"save up\" your daily allowance of carbs to eat at one meal. 
· Proteins have very little or no carbs per serving. Examples of proteins are beef, chicken, turkey, fish, eggs, tofu, cheese, cottage cheese, and peanut butter. A serving size of meat is 3 ounces, which is about the size of a deck of cards. Examples of meat substitute serving sizes (equal to 1 ounce of meat) are 1/4 cup of cottage cheese, 1 egg, 1 tablespoon of peanut butter, and ½ cup of tofu. How can you eat out and still eat healthy? · Learn to estimate the serving sizes of foods that have carbohydrate. If you measure food at home, it will be easier to estimate the amount in a serving of restaurant food. · If the meal you order has too much carbohydrate (such as potatoes, corn, or baked beans), ask to have a low-carbohydrate food instead. Ask for a salad or green vegetables. · If you use insulin, check your blood sugar before and after eating out to help you plan how much to eat in the future. · If you eat more carbohydrate at a meal than you had planned, take a walk or do other exercise. This will help lower your blood sugar. What else should you know? · Limit saturated fat, such as the fat from meat and dairy products. This is a healthy choice because people who have diabetes are at higher risk of heart disease. So choose lean cuts of meat and nonfat or low-fat dairy products. Use olive or canola oil instead of butter or shortening when cooking. · Don't skip meals. Your blood sugar may drop too low if you skip meals and take insulin or certain medicines for diabetes. · Check with your doctor before you drink alcohol. Alcohol can cause your blood sugar to drop too low. Alcohol can also cause a bad reaction if you take certain diabetes medicines. Follow-up care is a key part of your treatment and safety. Be sure to make and go to all appointments, and call your doctor if you are having problems. It's also a good idea to know your test results and keep a list of the medicines you take. Where can you learn more? Go to http://live-carmen.info/. Enter G361 in the search box to learn more about \"Learning About Diabetes Food Guidelines. \" Current as of: March 13, 2017 Content Version: 11.4 © 9184-4775 [employer]. Care instructions adapted under license by [employer] (which disclaims liability or warranty for this information).  If you have questions about a medical condition or this instruction, always ask your healthcare professional. [address] [address] any warranty or liability for your use of this information. Introducing [facility]! [employer] introduces MemberConnection patient portal. Now you can access parts of your medical record, email your doctor's office, and request medication refills online. 1. In your internet browser, go to [email or web address] 2. Click on the First Time User? Click Here link in the Sign In box. You will see the New Member Sign Up page. 3. Enter your MemberConnection Access Code exactly as it appears below. You will not need to use this code after youve completed the sign-up process. If you do not sign up before the expiration date, you must request a new code. · MemberConnection Access Code: [email or web address] Expires: 8/27/2018  8:51 AM 
 
4. Enter the last four digits of your Social Security Number (xxxx) and Date of Birth (mm/dd/yyyy) as indicated and click Submit. You will be taken to the next sign-up page. 5. Create a MemberConnection ID. This will be your MemberConnection login ID and cannot be changed, so think of one that is secure and easy to remember. 6. Create a MemberConnection password. You can change your password at any time. 7. Enter your Password Reset Question and Answer. This can be used at a later time if you forget your password. 8. Enter your e-mail address. You will receive e-mail notification when new information is available in [address] 9. Click Sign Up. You can now view and download portions of your medical record. 10. Click the Download Summary menu link to download a portable copy of your medical information. If you have questions, please visit the Frequently Asked Questions section of the MemberConnection website. Remember, MemberConnection is NOT to be used for urgent needs. For medical emergencies, dial 911. Now available from your iPhone and Android! Please provide this summary of care documentation to your next provider. Your primary care clinician is listed as Travis Ulloa. If you have any questions after today's visit, please call [phone].

## 2018-10-08 ENCOUNTER — OFFICE VISIT (OUTPATIENT)
Dept: FAMILY MEDICINE CLINIC | Age: 63
End: 2018-10-08

## 2018-10-08 VITALS
TEMPERATURE: 98.4 F | SYSTOLIC BLOOD PRESSURE: 155 MMHG | RESPIRATION RATE: 18 BRPM | BODY MASS INDEX: 41.75 KG/M2 | DIASTOLIC BLOOD PRESSURE: 82 MMHG | OXYGEN SATURATION: 97 % | WEIGHT: 315 LBS | HEART RATE: 86 BPM | HEIGHT: 73 IN

## 2018-10-08 DIAGNOSIS — E78.5 HYPERLIPIDEMIA, UNSPECIFIED HYPERLIPIDEMIA TYPE: ICD-10-CM

## 2018-10-08 DIAGNOSIS — G89.29 CHRONIC BILATERAL LOW BACK PAIN WITHOUT SCIATICA: ICD-10-CM

## 2018-10-08 DIAGNOSIS — M54.50 CHRONIC BILATERAL LOW BACK PAIN WITHOUT SCIATICA: ICD-10-CM

## 2018-10-08 DIAGNOSIS — F41.0 PANIC ATTACK: ICD-10-CM

## 2018-10-08 DIAGNOSIS — I10 ESSENTIAL HYPERTENSION: ICD-10-CM

## 2018-10-08 LAB — HBA1C MFR BLD HPLC: 9.1 %

## 2018-10-08 RX ORDER — METFORMIN HYDROCHLORIDE 1000 MG/1
1000 TABLET ORAL 2 TIMES DAILY WITH MEALS
Qty: 180 TAB | Refills: 1 | Status: SHIPPED | OUTPATIENT
Start: 2018-10-08 | End: 2019-06-14 | Stop reason: SDUPTHER

## 2018-10-08 RX ORDER — LISINOPRIL AND HYDROCHLOROTHIAZIDE 20; 25 MG/1; MG/1
1 TABLET ORAL DAILY
Qty: 90 TAB | Refills: 1 | Status: SHIPPED | OUTPATIENT
Start: 2018-10-08 | End: 2019-04-22 | Stop reason: SDUPTHER

## 2018-10-08 RX ORDER — VERAPAMIL HYDROCHLORIDE 180 MG/1
180 TABLET, EXTENDED RELEASE ORAL
Qty: 90 TAB | Refills: 1 | Status: SHIPPED | OUTPATIENT
Start: 2018-10-08 | End: 2019-01-08 | Stop reason: SDUPTHER

## 2018-10-08 RX ORDER — GLIPIZIDE 10 MG/1
10 TABLET ORAL 2 TIMES DAILY
Qty: 90 TAB | Refills: 1 | Status: SHIPPED | OUTPATIENT
Start: 2018-10-08 | End: 2019-04-22 | Stop reason: SDUPTHER

## 2018-10-08 RX ORDER — PRAVASTATIN SODIUM 40 MG/1
40 TABLET ORAL
Qty: 90 TAB | Refills: 1 | Status: SHIPPED | OUTPATIENT
Start: 2018-10-08 | End: 2019-04-22 | Stop reason: SDUPTHER
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
## 2018-10-08 NOTE — PROGRESS NOTES
1. Have you been to the ER, urgent care clinic, or been hospitalized since your last visit? No     2. Have you seen or consulted any other health care providers outside of the [address] [place] [address] since your last visit?   No     Reviewed record in preparation for visit and have necessary documentation  opportunity was given for questions  Goals that were addressed and/or need to be completed during or after this appointment include     Health Maintenance Due   Topic Date Due    Pneumococcal 19-64 Medium Risk (1 of 1 - PPSV23) 06/07/1976    Shingrix Vaccine Age 50> (1 of 2) 06/07/2007    DTaP/Tdap/Td series (1 - Tdap) 01/22/2011

## 2018-10-08 NOTE — PATIENT INSTRUCTIONS
Learning About Diabetes Food Guidelines  Your Care Instructions    Meal planning is important to manage diabetes. It helps keep your blood sugar at a target level (which you set with your doctor). You don't have to eat special foods. You can eat what your family eats, including sweets once in a while. But you do have to pay attention to how often you eat and how much you eat of certain foods. You may want to work with a dietitian or a certified diabetes educator (CDE) to help you plan meals and snacks. A dietitian or CDE can also help you lose weight if that is one of your goals. What should you know about eating carbs? Managing the amount of carbohydrate (carbs) you eat is an important part of healthy meals when you have diabetes. Carbohydrate is found in many foods. · Learn which foods have carbs. And learn the amounts of carbs in different foods. ¨ Bread, cereal, pasta, and rice have about 15 grams of carbs in a serving. A serving is 1 slice of bread (1 ounce), ½ cup of cooked cereal, or 1/3 cup of cooked pasta or rice. ¨ Fruits have 15 grams of carbs in a serving. A serving is 1 small fresh fruit, such as an apple or orange; ½ of a banana; ½ cup of cooked or canned fruit; ½ cup of fruit juice; 1 cup of melon or raspberries; or 2 tablespoons of dried fruit. ¨ Milk and no-sugar-added yogurt have 15 grams of carbs in a serving. A serving is 1 cup of milk or 2/3 cup of no-sugar-added yogurt. ¨ Starchy vegetables have 15 grams of carbs in a serving. A serving is ½ cup of mashed potatoes or sweet potato; 1 cup winter squash; ½ of a small baked potato; ½ cup of cooked beans; or ½ cup cooked corn or green peas. · Learn how much carbs to eat each day and at each meal. A dietitian or CDE can teach you how to keep track of the amount of carbs you eat. This is called carbohydrate counting. · If you are not sure how to count carbohydrate grams, use the Plate Method to plan meals.  It is a good, quick way to make sure that you have a balanced meal. It also helps you spread carbs throughout the day. ¨ Divide your plate by types of foods. Put non-starchy vegetables on half the plate, meat or other protein food on one-quarter of the plate, and a grain or starchy vegetable in the final quarter of the plate. To this you can add a small piece of fruit and 1 cup of milk or yogurt, depending on how many carbs you are supposed to eat at a meal.  · Try to eat about the same amount of carbs at each meal. Do not \"save up\" your daily allowance of carbs to eat at one meal.  · Proteins have very little or no carbs per serving. Examples of proteins are beef, chicken, turkey, fish, eggs, tofu, cheese, cottage cheese, and peanut butter. A serving size of meat is 3 ounces, which is about the size of a deck of cards. Examples of meat substitute serving sizes (equal to 1 ounce of meat) are 1/4 cup of cottage cheese, 1 egg, 1 tablespoon of peanut butter, and ½ cup of tofu. How can you eat out and still eat healthy? · Learn to estimate the serving sizes of foods that have carbohydrate. If you measure food at home, it will be easier to estimate the amount in a serving of restaurant food. · If the meal you order has too much carbohydrate (such as potatoes, corn, or baked beans), ask to have a low-carbohydrate food instead. Ask for a salad or green vegetables. · If you use insulin, check your blood sugar before and after eating out to help you plan how much to eat in the future. · If you eat more carbohydrate at a meal than you had planned, take a walk or do other exercise. This will help lower your blood sugar. What else should you know? · Limit saturated fat, such as the fat from meat and dairy products. This is a healthy choice because people who have diabetes are at higher risk of heart disease. So choose lean cuts of meat and nonfat or low-fat dairy products.  Use olive or canola oil instead of butter or shortening when cooking. · Don't skip meals. Your blood sugar may drop too low if you skip meals and take insulin or certain medicines for diabetes. · Check with your doctor before you drink alcohol. Alcohol can cause your blood sugar to drop too low. Alcohol can also cause a bad reaction if you take certain diabetes medicines. Follow-up care is a key part of your treatment and safety. Be sure to make and go to all appointments, and call your doctor if you are having problems. It's also a good idea to know your test results and keep a list of the medicines you take. Where can you learn more? Go to http://live64 Pixelscarmen.info/. Enter T084 in the search box to learn more about \"Learning About Diabetes Food Guidelines. \"  Current as of: December 7, 2017  Content Version: 11.8  © 3023-2190 [employer]. Care instructions adapted under license by [employer] (which disclaims liability or warranty for this information). If you have questions about a medical condition or this instruction, always ask your healthcare professional. [place] [address] any warranty or liability for your use of this information. Back Pain: Care Instructions  Your Care Instructions    Back pain has many possible causes. It is often related to problems with muscles and ligaments of the back. It may also be related to problems with the nerves, discs, or bones of the back. Moving, lifting, standing, sitting, or sleeping in an awkward way can strain the back. Sometimes you don't notice the injury until later. Arthritis is another common cause of back pain. Although it may hurt a lot, back pain usually improves on its own within several weeks. Most people recover in 12 weeks or less. Using good home treatment and being careful not to stress your back can help you feel better sooner. Follow-up care is a key part of your treatment and safety.  Be sure to make and go to all appointments, and call your doctor if you are having problems. It's also a good idea to know your test results and keep a list of the medicines you take. How can you care for yourself at home? · Sit or lie in positions that are most comfortable and reduce your pain. Try one of these positions when you lie down:  ¨ Lie on your back with your knees bent and supported by large pillows. ¨ Lie on the floor with your legs on the seat of a sofa or chair. Alka Braulio on your side with your knees and hips bent and a pillow between your legs. ¨ Lie on your stomach if it does not make pain worse. · Do not sit up in bed, and avoid soft couches and twisted positions. Bed rest can help relieve pain at first, but it delays healing. Avoid bed rest after the first day of back pain. · Change positions every 30 minutes. If you must sit for long periods of time, take breaks from sitting. Get up and walk around, or lie in a comfortable position. · Try using a heating pad on a low or medium setting for 15 to 20 minutes every 2 or 3 hours. Try a warm shower in place of one session with the heating pad. · You can also try an ice pack for 10 to 15 minutes every 2 to 3 hours. Put a thin cloth between the ice pack and your skin. · Take pain medicines exactly as directed. ¨ If the doctor gave you a prescription medicine for pain, take it as prescribed. ¨ If you are not taking a prescription pain medicine, ask your doctor if you can take an over-the-counter medicine. · Take short walks several times a day. You can start with 5 to 10 minutes, 3 or 4 times a day, and work up to longer walks. Walk on level surfaces and avoid hills and stairs until your back is better. · Return to work and other activities as soon as you can. Continued rest without activity is usually not good for your back. · To prevent future back pain, do exercises to stretch and strengthen your back and stomach.  Learn how to use good posture, safe lifting techniques, and proper body mechanics. When should you call for help? Call your doctor now or seek immediate medical care if:    · You have new or worsening numbness in your legs.     · You have new or worsening weakness in your legs. (This could make it hard to stand up.)     · You lose control of your bladder or bowels.    Watch closely for changes in your health, and be sure to contact your doctor if:    · You have a fever, lose weight, or don't feel well.     · You do not get better as expected. Where can you learn more? Go to http://live-carmen.info/. Enter V049 in the search box to learn more about \"Back Pain: Care Instructions. \"  Current as of: November 29, 2017  Content Version: 11.8  © 2214-9889 [employer]. Care instructions adapted under license by [employer] (which disclaims liability or warranty for this information). If you have questions about a medical condition or this instruction, always ask your healthcare professional. [place] [address] any warranty or liability for your use of this information. Lidocaine Patch (On the skin)   Lidocaine ([id number])  Treats nerve pain that is caused by herpes zoster or shingles. Brand Name(s): Aspercreme, DermacinRx PHN [place], DermacinRx ZRM [place], Dermazyl, Lidocaine Novaplus, [place], [place], [place], [place]   There may be other brand names for this medicine. When This Medicine Should Not Be Used: This medicine is not right for everyone. Do not use it if you had an allergic reaction to lidocaine or similar medicines. How to Use This Medicine:   Patch  · Your doctor will tell you how many patches to use, where to apply them, and how often to apply them. Do not use more patches or apply them more often than your doctor tells you to. · This medicine should be used only on the skin. Do not get it in your eyes, nose, or mouth. If it does get on these areas, rinse it off with water or saline right away.   · Keep the patch in its envelope until you are ready to put it on. You may cut the patch into a smaller size with scissors before you take off the patch liner. · Wash your hands with soap and water before and after applying a patch. · Apply the patch to clean, dry skin. Do not put the patch over burns, cuts, or irritated skin. · The patch will not stick if it gets wet. Do not wear it when you take a bath or shower, or when you go swimming. · Do not wear the patch for longer than 12 hours in any 24-hour period. · Store the patches at room temperature in a closed container, away from heat, moisture, and direct light. · Fold the used patch in half with the sticky sides together. Throw any used patch away so that children or pets cannot get to it. You will also need to throw away old patches after the expiration date has passed. Drugs and Foods to Avoid:   Ask your doctor or pharmacist before using any other medicine, including over-the-counter medicines, vitamins, and herbal products. · Some foods and medicines can affect how lidocaine works. Tell your doctor if you are using the followin [email or web address] for heart rhythm problems, such as mexiletine  ¨ Other topical medicine  Warnings While Using This Medicine:   · Tell your doctor if you are pregnant or breastfeeding, or if you have liver disease. Tell your doctor if you are allergic to any other medicine. · Do not use a heating pad, electric blanket, or other heat source over the patch. .  · Keep all medicine out of the reach of children. Never share your medicine with anyone.   Possible Side Effects While Using This Medicine:   Call your doctor right away if you notice any of these side effects:  · Allergic reaction: Itching or hives, swelling in your face or hands, swelling or tingling in your mouth or throat, chest tightness, trouble breathing  · Redness, itching, burning, swelling, or blisters where the patch is applied  If you notice other side effects that you think are caused by this medicine, tell your doctor. Call your doctor for medical advice about side effects. You may report side effects to FDA at 6-574-CAH-3960  © 2017 [facility] Information is for End User's use only and may not be sold, redistributed or otherwise used for commercial purposes. The above information is an  only. It is not intended as medical advice for individual conditions or treatments. Talk to your doctor, nurse or pharmacist before following any medical regimen to see if it is safe and effective for you.

## 2018-10-08 NOTE — MR AVS SNAPSHOT
[address] [address] 
[phone] Patient: Chely Boucher MRN: [id number] OCA:[birth year] Visit Information Date & Time Provider Department Dept. Phone Encounter #  
 10/8/2018  8:00 AM Alba Weinberg  [facility] [phone] [id number] Follow-up Instructions Return in about 3 months (around 1/8/2019) for Routine with blood work. Upcoming Health Maintenance Date Due Pneumococcal 19-64 Medium Risk (1 of 1 - PPSV23) 6/7/1976 Shingrix Vaccine Age 50> (1 of 2) 6/7/2007 DTaP/Tdap/Td series (1 - Tdap) 1/22/2011 HEMOGLOBIN A1C Q6M 11/29/2018 MICROALBUMIN Q1 1/25/2019 LIPID PANEL Q1 1/25/2019 EYE EXAM RETINAL OR DILATED Q1 5/18/2019 FOOT EXAM Q1 5/29/2019 FOBT Q 1 YEAR AGE 50-75 6/25/2019 Allergies as of 10/8/2018  Review Complete On: 10/8/2018 By: Dory Huffman LPN Severity Noted Reaction Type Reactions Cyclobenzaprine  09/26/2014   Side Effect Other (comments) Leg cramps Percocet [Oxycodone-acetaminophen]  02/25/2010    Nausea Only Current Immunizations  Reviewed on 12/22/2010 Name Date Influenza Vaccine Split 12/22/2010 TD Vaccine 1/21/2011 Not reviewed this visit You Were Diagnosed With   
  
 Codes Comments Uncontrolled type 2 diabetes mellitus with diabetic nephropathy, with long-term current use of insulin ([facility] [address]    -  Primary ICD-10-CM: E11.21, E11.65, Z79.4 ICD-9-CM: 250.42, 583.81, V58.67 Hyperlipidemia, unspecified hyperlipidemia type     ICD-10-CM: E78.5 ICD-9-CM: 272.4 Essential hypertension     ICD-10-CM: I10 
ICD-9-CM: 401.9 Panic attack     ICD-10-CM: F41.0 ICD-9-CM: 300.01 Chronic bilateral low back pain without sciatica     ICD-10-CM: M54.5, G89.29 ICD-9-CM: 724.2, 338.29 Vitals BP Pulse Temp Resp Height(growth percentile) Weight(growth percentile) 155/82 86 98.4 °F (36.9 °C) (Oral) 18 6' 1\" (1.854 m) 331 lb (150.1 kg) SpO2 BMI Smoking Status 97% 43.67 kg/m2 Former Smoker Vitals History BMI and BSA Data Body Mass Index Body Surface Area  
 43.67 kg/m 2 2.78 m 2 Preferred Pharmacy Pharmacy Name Phone [address] [place] [address] [phone] Your Updated Medication List  
  
   
This list is accurate as of 10/8/18  8:31 AM.  Always use your most recent med list.  
  
  
  
  
 aspirin 325 mg tablet Commonly known as:  ASPIRIN Take 1 Tab by mouth daily. glipiZIDE 10 mg tablet Commonly known as:  Ruba Stalls Take 1 Tab by mouth two (2) times a day. glucose blood VI test strips strip Commonly known as:  blood glucose test  
Test TID for DM-2 treated with insulin. Insulin Needles (Disposable) 31 gauge x 5/16\" Ndle Inject hs for DM-2 250.00  
  
 insulin  unit/mL injection Commonly known as:  Kranthi Oxford Inject 35 units twice a day for DM-2 (250.00)  Indications: type 2 diabetes mellitus  
  
 insulin syringe-needle U-100 Dispense microfine 1 mL syringes with 30 gauge needle. Inject 23 units NPH twice a day for DM-2 (250.00) Lancets Misc Test TID for DM-2 treated with insulin (250.00)  
  
 lisinopril-hydroCHLOROthiazide 20-25 mg per tablet Commonly known as:  Richerd Benes Take 1 Tab by mouth daily. metFORMIN 1,000 mg tablet Commonly known as:  GLUCOPHAGE Take 1 Tab by mouth two (2) times daily (with meals). metoprolol tartrate 50 mg tablet Commonly known as:  LOPRESSOR Take 1 Tab by mouth two (2) times a day. nicotinic acid 500 mg tablet Commonly known as:  NIACIN Take 500 mg by mouth Daily (before breakfast). omega-3 fatty acids-vitamin e 1,000 mg Cap Commonly known as:  FISH OIL Take 2 Caps by mouth two (2) times a day. pravastatin 40 mg tablet Commonly known as:  PRAVACHOL Take 1 Tab by mouth nightly. verapamil  mg CR tablet Commonly known as:  CALAN-SR Take 1 Tab by mouth nightly. VITAMIN D3 1,000 unit tablet Generic drug:  cholecalciferol Take 1 Tab by mouth daily. Indications: Vitamin D Deficiency Prescriptions Sent to Pharmacy Refills  
 glipiZIDE (GLUCOTROL) 10 mg tablet 1 Sig: Take 1 Tab by mouth two (2) times a day. Class: Normal  
 Pharmacy: [facility] [address] [address] Ph #: [phone] Route: Oral  
 pravastatin (PRAVACHOL) 40 mg tablet 1 Sig: Take 1 Tab by mouth nightly. Class: Normal  
 Pharmacy: [facility] [address] [address] Ph #: [phone] Route: Oral  
 verapamil ER (CALAN-SR) 180 mg CR tablet 1 Sig: Take 1 Tab by mouth nightly. Class: Normal  
 Pharmacy: [facility] [address] [address] Ph #: [phone] Route: Oral  
 lisinopril-hydroCHLOROthiazide (PRINZIDE, ZESTORETIC) 20-25 mg per tablet 1 Sig: Take 1 Tab by mouth daily. Class: Normal  
 Pharmacy: [facility] [address] [place] [address] Ph #: [phone] Route: Oral  
 metFORMIN (GLUCOPHAGE) 1,000 mg tablet 1 Sig: Take 1 Tab by mouth two (2) times daily (with meals). Class: Normal  
 Pharmacy: [facility] [address] [address] Ph #: [phone] Route: Oral  
  
We Performed the Following HEMOGLOBIN A1C WITH EAG [74006 CPT(R)] LIPID PANEL [04621 CPT(R)] METABOLIC PANEL, BASIC [47976 CPT(R)] Follow-up Instructions Return in about 3 months (around 1/8/2019) for Routine with blood work. Patient Instructions Learning About Diabetes Food Guidelines Your Care Instructions Meal planning is important to manage diabetes.  It helps keep your blood sugar at a target level (which you set with your doctor). You don't have to eat special foods. You can eat what your family eats, including sweets once in a while. But you do have to pay attention to how often you eat and how much you eat of certain foods. You may want to work with a dietitian or a certified diabetes educator (CDE) to help you plan meals and snacks. A dietitian or CDE can also help you lose weight if that is one of your goals. What should you know about eating carbs? Managing the amount of carbohydrate (carbs) you eat is an important part of healthy meals when you have diabetes. Carbohydrate is found in many foods. · Learn which foods have carbs. And learn the amounts of carbs in different foods. ¨ Bread, cereal, pasta, and rice have about 15 grams of carbs in a serving. A serving is 1 slice of bread (1 ounce), ½ cup of cooked cereal, or 1/3 cup of cooked pasta or rice. ¨ Fruits have 15 grams of carbs in a serving. A serving is 1 small fresh fruit, such as an apple or orange; ½ of a banana; ½ cup of cooked or canned fruit; ½ cup of fruit juice; 1 cup of melon or raspberries; or 2 tablespoons of dried fruit. ¨ Milk and no-sugar-added yogurt have 15 grams of carbs in a serving. A serving is 1 cup of milk or 2/3 cup of no-sugar-added yogurt. ¨ Starchy vegetables have 15 grams of carbs in a serving. A serving is ½ cup of mashed potatoes or sweet potato; 1 cup winter squash; ½ of a small baked potato; ½ cup of cooked beans; or ½ cup cooked corn or green peas. · Learn how much carbs to eat each day and at each meal. A dietitian or CDE can teach you how to keep track of the amount of carbs you eat. This is called carbohydrate counting. · If you are not sure how to count carbohydrate grams, use the Plate Method to plan meals. It is a good, quick way to make sure that you have a balanced meal. It also helps you spread carbs throughout the day. ¨ Divide your plate by types of foods. Put non-starchy vegetables on half the plate, meat or other protein food on one-quarter of the plate, and a grain or starchy vegetable in the final quarter of the plate. To this you can add a small piece of fruit and 1 cup of milk or yogurt, depending on how many carbs you are supposed to eat at a meal. 
· Try to eat about the same amount of carbs at each meal. Do not \"save up\" your daily allowance of carbs to eat at one meal. 
· Proteins have very little or no carbs per serving. Examples of proteins are beef, chicken, turkey, fish, eggs, tofu, cheese, cottage cheese, and peanut butter. A serving size of meat is 3 ounces, which is about the size of a deck of cards. Examples of meat substitute serving sizes (equal to 1 ounce of meat) are 1/4 cup of cottage cheese, 1 egg, 1 tablespoon of peanut butter, and ½ cup of tofu. How can you eat out and still eat healthy? · Learn to estimate the serving sizes of foods that have carbohydrate. If you measure food at home, it will be easier to estimate the amount in a serving of restaurant food. · If the meal you order has too much carbohydrate (such as potatoes, corn, or baked beans), ask to have a low-carbohydrate food instead. Ask for a salad or green vegetables. · If you use insulin, check your blood sugar before and after eating out to help you plan how much to eat in the future. · If you eat more carbohydrate at a meal than you had planned, take a walk or do other exercise. This will help lower your blood sugar. What else should you know? · Limit saturated fat, such as the fat from meat and dairy products. This is a healthy choice because people who have diabetes are at higher risk of heart disease. So choose lean cuts of meat and nonfat or low-fat dairy products. Use olive or canola oil instead of butter or shortening when cooking. · Don't skip meals.  Your blood sugar may drop too low if you skip meals and take insulin or certain medicines for diabetes. · Check with your doctor before you drink alcohol. Alcohol can cause your blood sugar to drop too low. Alcohol can also cause a bad reaction if you take certain diabetes medicines. Follow-up care is a key part of your treatment and safety. Be sure to make and go to all appointments, and call your doctor if you are having problems. It's also a good idea to know your test results and keep a list of the medicines you take. Where can you learn more? Go to http://live-carmen.info/. Enter I980 in the search box to learn more about \"Learning About Diabetes Food Guidelines. \" Current as of: December 7, 2017 Content Version: 11.8 © 2382-9304 [employer]. Care instructions adapted under license by [employer] (which disclaims liability or warranty for this information). If you have questions about a medical condition or this instruction, always ask your healthcare professional. [address] [address] any warranty or liability for your use of this information. Back Pain: Care Instructions Your Care Instructions Back pain has many possible causes. It is often related to problems with muscles and ligaments of the back. It may also be related to problems with the nerves, discs, or bones of the back. Moving, lifting, standing, sitting, or sleeping in an awkward way can strain the back. Sometimes you don't notice the injury until later. Arthritis is another common cause of back pain. Although it may hurt a lot, back pain usually improves on its own within several weeks. Most people recover in 12 weeks or less. Using good home treatment and being careful not to stress your back can help you feel better sooner. Follow-up care is a key part of your treatment and safety.  Be sure to make and go to all appointments, and call your doctor if you are having problems. It's also a good idea to know your test results and keep a list of the medicines you take. How can you care for yourself at home? · Sit or lie in positions that are most comfortable and reduce your pain. Try one of these positions when you lie down: ¨ Lie on your back with your knees bent and supported by large pillows. ¨ Lie on the floor with your legs on the seat of a sofa or chair. Karen Terry on your side with your knees and hips bent and a pillow between your legs. ¨ Lie on your stomach if it does not make pain worse. · Do not sit up in bed, and avoid soft couches and twisted positions. Bed rest can help relieve pain at first, but it delays healing. Avoid bed rest after the first day of back pain. · Change positions every 30 minutes. If you must sit for long periods of time, take breaks from sitting. Get up and walk around, or lie in a comfortable position. · Try using a heating pad on a low or medium setting for 15 to 20 minutes every 2 or 3 hours. Try a warm shower in place of one session with the heating pad. · You can also try an ice pack for 10 to 15 minutes every 2 to 3 hours. Put a thin cloth between the ice pack and your skin. · Take pain medicines exactly as directed. ¨ If the doctor gave you a prescription medicine for pain, take it as prescribed. ¨ If you are not taking a prescription pain medicine, ask your doctor if you can take an over-the-counter medicine. · Take short walks several times a day. You can start with 5 to 10 minutes, 3 or 4 times a day, and work up to longer walks. Walk on level surfaces and avoid hills and stairs until your back is better. · Return to work and other activities as soon as you can. Continued rest without activity is usually not good for your back. · To prevent future back pain, do exercises to stretch and strengthen your back and stomach. Learn how to use good posture, safe lifting techniques, and proper body mechanics. When should you call for help? Call your doctor now or seek immediate medical care if: 
  · You have new or worsening numbness in your legs.  
  · You have new or worsening weakness in your legs. (This could make it hard to stand up.)  
  · You lose control of your bladder or bowels.  
 Watch closely for changes in your health, and be sure to contact your doctor if: 
  · You have a fever, lose weight, or don't feel well.  
  · You do not get better as expected. Where can you learn more? Go to http://live-carmen.info/. Enter G471 in the search box to learn more about \"Back Pain: Care Instructions. \" Current as of: November 29, 2017 Content Version: 11.8 © 1191-2806 [employer]. Care instructions adapted under license by [employer] (which disclaims liability or warranty for this information). If you have questions about a medical condition or this instruction, always ask your healthcare professional. [place] [address] any warranty or liability for your use of this information. Lidocaine Patch (On the skin) Lidocaine ([id number]) Treats nerve pain that is caused by herpes zoster or shingles. Brand Name(s): Aspercreme, DermacinRx PHN [place], DermacinRx ZRM [place], Dermazyl, Lidocaine Novaplus, [place], [place], [place], [place] There may be other brand names for this medicine. When This Medicine Should Not Be Used: This medicine is not right for everyone. Do not use it if you had an allergic reaction to lidocaine or similar medicines. How to Use This Medicine:  
Patch · Your doctor will tell you how many patches to use, where to apply them, and how often to apply them. Do not use more patches or apply them more often than your doctor tells you to. · This medicine should be used only on the skin. Do not get it in your eyes, nose, or mouth. If it does get on these areas, rinse it off with water or saline right away. · Keep the patch in its envelope until you are ready to put it on. You may cut the patch into a smaller size with scissors before you take off the patch liner. · Wash your hands with soap and water before and after applying a patch. · Apply the patch to clean, dry skin. Do not put the patch over burns, cuts, or irritated skin. · The patch will not stick if it gets wet. Do not wear it when you take a bath or shower, or when you go swimming. · Do not wear the patch for longer than 12 hours in any 24-hour period. · Store the patches at room temperature in a closed container, away from heat, moisture, and direct light. · Fold the used patch in half with the sticky sides together. Throw any used patch away so that children or pets cannot get to it. You will also need to throw away old patches after the expiration date has passed. Drugs and Foods to Avoid: Ask your doctor or pharmacist before using any other medicine, including over-the-counter medicines, vitamins, and herbal products. · Some foods and medicines can affect how lidocaine works. Tell your doctor if you are using the following: ¨ Medicine for heart rhythm problems, such as mexiletine ¨ Other topical medicine Warnings While Using This Medicine: · Tell your doctor if you are pregnant or breastfeeding, or if you have liver disease. Tell your doctor if you are allergic to any other medicine. · Do not use a heating pad, electric blanket, or other heat source over the patch. . 
· Keep all medicine out of the reach of children. Never share your medicine with anyone. Possible Side Effects While Using This Medicine:  
Call your doctor right away if you notice any of these side effects: · Allergic reaction: Itching or hives, swelling in your face or hands, swelling or tingling in your mouth or throat, chest tightness, trouble breathing · Redness, itching, burning, swelling, or blisters where the patch is applied If you notice other side effects that you think are caused by this medicine, tell your doctor. Call your doctor for medical advice about side effects. You may report side effects to FDA at 3-545-EJB-4541 © 2017 [facility] Information is for End User's use only and may not be sold, redistributed or otherwise used for commercial purposes. The above information is an  only. It is not intended as medical advice for individual conditions or treatments. Talk to your doctor, nurse or pharmacist before following any medical regimen to see if it is safe and effective for you. Introducing [facility]! Ashlee Gusman introduces [employer] patient portal. Now you can access parts of your medical record, email your doctor's office, and request medication refills online. 1. In your internet browser, go to [email or web address]) 2. Click on the First Time User? Click Here link in the Sign In box. You will see the New Member Sign Up page. 3. Enter your [employer] Access Code exactly as it appears below. You will not need to use this code after youve completed the sign-up process. If you do not sign up before the expiration date, you must request a new code. · [employer] Access Code: [email or web address] Expires: 1/6/2019  8:26 AM 
 
4. Enter the last four digits of your Social Security Number (xxxx) and Date of Birth (mm/dd/yyyy) as indicated and click Submit. You will be taken to the next sign-up page. 5. Create a [employer] ID. This will be your [employer] login ID and cannot be changed, so think of one that is secure and easy to remember. 6. Create a [employer] password. You can change your password at any time. 7. Enter your Password Reset Question and Answer. This can be used at a later time if you forget your password. 8. Enter your e-mail address. You will receive e-mail notification when new information is available in [address] 9. Click Sign Up. You can now view and download portions of your medical record. 10. Click the Download Summary menu link to download a portable copy of your medical information. If you have questions, please visit the Frequently Asked Questions section of the [employer] website. Remember, [employer] is NOT to be used for urgent needs. For medical emergencies, dial 911. Now available from your iPhone and Android! Please provide this summary of care documentation to your next provider. Your primary care clinician is listed as Kaleigh Prieto. If you have any questions after today's visit, please call [phone].

## 2019-01-08 ENCOUNTER — OFFICE VISIT (OUTPATIENT)
Dept: FAMILY MEDICINE CLINIC | Age: 64
End: 2019-01-08

## 2019-01-08 VITALS
BODY MASS INDEX: 41.75 KG/M2 | SYSTOLIC BLOOD PRESSURE: 139 MMHG | OXYGEN SATURATION: 97 % | WEIGHT: 315 LBS | HEIGHT: 73 IN | HEART RATE: 85 BPM | TEMPERATURE: 98.3 F | RESPIRATION RATE: 16 BRPM | DIASTOLIC BLOOD PRESSURE: 87 MMHG

## 2019-01-08 DIAGNOSIS — I10 ESSENTIAL HYPERTENSION: Primary | ICD-10-CM

## 2019-01-08 DIAGNOSIS — E78.5 HYPERLIPIDEMIA, UNSPECIFIED HYPERLIPIDEMIA TYPE: ICD-10-CM

## 2019-01-08 RX ORDER — METOPROLOL TARTRATE 50 MG/1
50 TABLET ORAL 2 TIMES DAILY
Qty: 180 TAB | Refills: 2 | Status: SHIPPED | OUTPATIENT
Start: 2019-01-08 | End: 2019-10-30 | Stop reason: SDUPTHER

## 2019-01-08 RX ORDER — VERAPAMIL HYDROCHLORIDE 180 MG/1
180 TABLET, EXTENDED RELEASE ORAL
Qty: 90 TAB | Refills: 1 | Status: SHIPPED | OUTPATIENT
Start: 2019-01-08 | End: 2020-01-31 | Stop reason: SDUPTHER

## 2019-01-08 NOTE — PROGRESS NOTES
1. Have you been to the ER, urgent care clinic, or been hospitalized since your last visit? No  
 
2. Have you seen or consulted any other health care providers outside of the [address] [place] [address] since your last visit? No  
 
Reviewed record in preparation for visit and have necessary documentation Opportunity was given for questions Goals that were addressed and/or need to be completed during or after this appointment include Health Maintenance Due Topic Date Due  Pneumococcal 19-64 Medium Risk (1 of 1 - PPSV23) 06/07/1976  Shingrix Vaccine Age 50> (1 of 2) 06/07/2007  DTaP/Tdap/Td series (1 - Tdap) 01/22/2011  MICROALBUMIN Q1  01/25/2019  LIPID PANEL Q1  01/25/2019

## 2019-01-08 NOTE — PATIENT INSTRUCTIONS
Diabetes Foot Health: Care Instructions Your Care Instructions When you have diabetes, your feet need extra care and attention. Diabetes can damage the nerve endings and blood vessels in your feet, making you less likely to notice when your feet are injured. Diabetes also limits your body's ability to fight infection and get blood to areas that need it. If you get a minor foot injury, it could become an ulcer or a serious infection. With good foot care, you can prevent most of these problems. Caring for your feet can be quick and easy. Most of the care can be done when you are bathing or getting ready for bed. Follow-up care is a key part of your treatment and safety. Be sure to make and go to all appointments, and call your doctor if you are having problems. It's also a good idea to know your test results and keep a list of the medicines you take. How can you care for yourself at home? · Keep your blood sugar close to normal by watching what and how much you eat, monitoring blood sugar, taking medicines if prescribed, and getting regular exercise. · Do not smoke. Smoking affects blood flow and can make foot problems worse. If you need help quitting, talk to your doctor about stop-smoking programs and medicines. These can increase your chances of quitting for good. · Eat a diet that is low in fats. High fat intake can cause fat to build up in your blood vessels and decrease blood flow. · Inspect your feet daily for blisters, cuts, cracks, or sores. If you cannot see well, use a mirror or have someone help you. · Take care of your feet: 
? Wash your feet every day. Use warm (not hot) water. Check the water temperature with your wrists or other part of your body, not your feet. ? Dry your feet well. Pat them dry. Do not rub the skin on your feet too hard. Dry well between your toes. If the skin on your feet stays moist, bacteria or a fungus can grow, which can lead to infection. ? Keep your skin soft. Use moisturizing skin cream to keep the skin on your feet soft and prevent calluses and cracks. But do not put the cream between your toes, and stop using any cream that causes a rash. ? Clean underneath your toenails carefully. Do not use a sharp object to clean underneath your toenails. Use the blunt end of a nail file or other rounded tool. ? Trim and file your toenails straight across to prevent ingrown toenails. Use a nail clipper, not scissors. Use an emery board to smooth the edges. · Change socks daily. Socks without seams are best, because seams often rub the feet. You can find socks for people with diabetes from specialty catalogs. · Look inside your shoes every day for things like gravel or torn linings, which could cause blisters or sores. · Buy shoes that fit well: 
? Look for shoes that have plenty of space around the toes. This helps prevent bunions and blisters. ? Try on shoes while wearing the kind of socks you will usually wear with the shoes. ? Avoid plastic shoes. They may rub your feet and cause blisters. Good shoes should be made of materials that are flexible and breathable, such as leather or cloth. ? Break in new shoes slowly by wearing them for no more than an hour a day for several days. Take extra time to check your feet for red areas, blisters, or other problems after you wear new shoes. · Do not go barefoot. Do not wear sandals, and do not wear shoes with very thin soles. Thin soles are easy to puncture. They also do not protect your feet from hot pavement or cold weather. · Have your doctor check your feet during each visit. If you have a foot problem, see your doctor. Do not try to treat an early foot problem at home. Home remedies or treatments that you can buy without a prescription (such as corn removers) can be harmful. · Always get early treatment for foot problems. A minor irritation can lead to a major problem if not properly cared for early. When should you call for help? Call your doctor now or seek immediate medical care if: 
  · You have a foot sore, an ulcer or break in the skin that is not healing after 4 days, bleeding corns or calluses, or an ingrown toenail.  
  · You have blue or black areas, which can mean bruising or blood flow problems.  
  · You have peeling skin or tiny blisters between your toes or cracking or oozing of the skin.  
  · You have a fever for more than 24 hours and a foot sore.  
  · You have new numbness or tingling in your feet that does not go away after you move your feet or change positions.  
  · You have unexplained or unusual swelling of the foot or ankle.  
 Watch closely for changes in your health, and be sure to contact your doctor if: 
  · You cannot do proper foot care. Where can you learn more? Go to http://live-carmen.info/. Enter A739 in the search box to learn more about \"Diabetes Foot Health: Care Instructions. \" Current as of: December 7, 2017 Content Version: 11.8 © 8313-0963 [employer]. Care instructions adapted under license by [employer] (which disclaims liability or warranty for this information). If you have questions about a medical condition or this instruction, always ask your healthcare professional. [address] any warranty or liability for your use of this information.

## 2019-01-08 NOTE — PROGRESS NOTES
Marten Siemens 66 y.o. male 1955 
[address] [place] [address] 
[id number] [facility] Practice: Progress Note Encounter Date: 1/8/2019 Chief Complaint Patient presents with  Diabetes  Cholesterol Problem  Hypertension History provided by patient History of Present Illness Marten Siemens is a 66 y.o. male who presents to clinic today for: 
  
 
Diabetes Follow up: Uncontrolled Overall the patient feels he is well. Therapy:oral agents (dual therapy): glipizide (generic), metformin (generic) 
insulin injections: NPH: 35 BID Medication compliance:Good Frequency of home glucose testing: Daily Blood Sugar range at home: 150-180 Pertinent Labs:  
  
Hemoglobin A1c Date Value Ref Range Status 05/29/2018 10.0 (H) 4.8 - 5.6 % Final  
  Comment:  
           Pre-diabetes: 5.7 - 6.4 Diabetes: >6.4 Glycemic control for adults with diabetes: <7.0 Estimated average glucose Date Value Ref Range Status 05/29/2018 240 mg/dL Final  
 
  
  
 
Hypertension: Controlled BP Readings from Last 3 Encounters:  
01/08/19 139/87  
10/08/18 155/82  
07/02/18 136/82 The patient reports:  taking medications as instructed, no medication side effects noted, no TIA's, no chest pain on exertion, no dyspnea on exertion, no swelling of ankles. Home monitoring:No 
Sodium Date Value Ref Range Status 05/29/2018 139 134 - 144 mmol/L Final  
 
Potassium Date Value Ref Range Status 05/29/2018 5.0 3.5 - 5.2 mmol/L Final  
 
Creatinine Date Value Ref Range Status 05/29/2018 1.07 0.76 - 1.27 mg/dL Final  
01/25/2018 0.98 0.76 - 1.27 mg/dL Final  
 
 
Our goal is to normalize the blood pressure to decrease the risks of strokes and heart attacks. The patient is in agreement with the plan. Hyperlipidemia:  Stable Cardiovascular risks for him are: diabetic 
hypertension 
hyperlipidemia. Currently he takes Pravachol (pravastatin) Myalgias: No 
Cholesterol, total  
Date Value Ref Range Status 01/25/2018 130 100 - 199 mg/dL Final  
 
HDL Cholesterol Date Value Ref Range Status 01/25/2018 40 >39 mg/dL Final  
 
LDL, calculated Date Value Ref Range Status 01/25/2018 67 0 - 99 mg/dL Final  
 
Triglyceride Date Value Ref Range Status 01/25/2018 113 0 - 149 mg/dL Final  
 
ALT (SGPT) Date Value Ref Range Status 10/27/2017 35 0 - 44 IU/L Final  
 
AST (SGOT) Date Value Ref Range Status 10/27/2017 17 0 - 40 IU/L Final  
 
Alk. phosphatase Date Value Ref Range Status 10/27/2017 59 39 - 117 IU/L Final  
 
 
Review of Systems Review of Systems Constitutional: Positive for weight loss (4 lbs). Negative for chills and fever. Respiratory: Negative for cough, sputum production and shortness of breath. Cardiovascular: Negative for chest pain, palpitations and leg swelling. Gastrointestinal: Negative for abdominal pain, constipation, diarrhea, nausea and vomiting. Vitals/Objective:  
 
Vitals:  
 01/08/19 0805 BP: 139/87 Pulse: 85 Resp: 16 Temp: 98.3 °F (36.8 °C) TempSrc: Oral  
SpO2: 97% Weight: 328 lb (148.8 kg) Height: 6' 1\" (1.854 m) Body mass index is 43.27 kg/m². Wt Readings from Last 3 Encounters:  
01/08/19 328 lb (148.8 kg) 10/08/18 331 lb (150.1 kg) 07/02/18 319 lb (144.7 kg) Physical Exam  
Constitutional: He is oriented to person, place, and time. He appears well-developed and well-nourished. HENT:  
Head: Normocephalic and atraumatic. Right Ear: External ear normal.  
Left Ear: External ear normal.  
Eyes: EOM are normal. Pupils are equal, round, and reactive to light. Neck: Normal range of motion. Neck supple. Cardiovascular: Normal rate and regular rhythm. No murmur heard. Pulmonary/Chest: Effort normal and breath sounds normal. No respiratory distress. He has no wheezes. Neurological: He is alert and oriented to person, place, and time. Skin: Skin is warm and dry. No results found for this or any previous visit (from the past 24 hour(s)). Assessment and Plan:  
 
Encounter Diagnoses ICD-10-CM ICD-9-CM 1. Essential hypertension I10 401.9 2. Uncontrolled type 2 diabetes mellitus with diabetic nephropathy, with long-term current use of insulin ([facility]) E11.21 250.42 E11.65 583.81  
 Z79.4 V58.67  
3. Hyperlipidemia, unspecified hyperlipidemia type E78.5 272.4 1. Essential hypertension Well controlled BP.  
- verapamil ER (CALAN-SR) 180 mg CR tablet; Take 1 Tab by mouth nightly. Dispense: 90 Tab; Refill: 1 
- metoprolol tartrate (LOPRESSOR) 50 mg tablet; Take 1 Tab by mouth two (2) times a day. Dispense: 180 Tab; Refill: 2 
- METABOLIC PANEL, BASIC 
 
2. Uncontrolled type 2 diabetes mellitus with diabetic nephropathy, with long-term current use of insulin ([facility] [address] Patient reporting improved BS reading at home. 
- LIPID PANEL 
- HEMOGLOBIN A1C WITH EAG 3. Hyperlipidemia, unspecified hyperlipidemia type Will likely need to increase to max dose. I have discussed the diagnosis with the patient and the intended plan as seen in the above orders. he has expressed understanding. The patient has received an after-visit summary and questions were answered concerning future plans. I have discussed medication side effects and warnings with the patient as well. Electronically Signed: Moses Wood MD 
  
History/Allergies Patients past medical, surgical and family histories were reviewed and updated. Past Medical History:  
Diagnosis Date  Hypercholesterolemia  Hypertension Past Surgical History:  
Procedure Laterality Date  HX ORTHOPAEDIC  2004  
 rt shoulder surgery  HX ORTHOPAEDIC    
 fx lft arm Family History Problem Relation Age of Onset  Diabetes Father  Hypertension Father Social History Socioeconomic History  Marital status:  Spouse name: Not on file  Number of children: Not on file  Years of education: Not on file  Highest education level: Not on file Social Needs  Financial resource strain: Not on file  Food insecurity - worry: Not on file  Food insecurity - inability: Not on file  Transportation needs - medical: Not on file  Transportation needs - non-medical: Not on file Occupational History  Not on file Tobacco Use  Smoking status: Former Smoker Last attempt to quit: 1992 Years since quittin.6  Smokeless tobacco: Never Used Substance and Sexual Activity  Alcohol use: No  
 Drug use: No  
 Sexual activity: Yes  
  Partners: Female Other Topics Concern  Not on file Social History Narrative  Not on file Allergies Allergen Reactions  Cyclobenzaprine Other (comments) Leg cramps  Percocet [Oxycodone-Acetaminophen] Nausea Only Disposition Follow-up Disposition: 
Return in about 3 months (around 2019) for Routine with blood work. No future appointments. Current Medications after this visit Current Outpatient Medications Medication Sig  verapamil ER (CALAN-SR) 180 mg CR tablet Take 1 Tab by mouth nightly.  metoprolol tartrate (LOPRESSOR) 50 mg tablet Take 1 Tab by mouth two (2) times a day.  glipiZIDE (GLUCOTROL) 10 mg tablet Take 1 Tab by mouth two (2) times a day.  pravastatin (PRAVACHOL) 40 mg tablet Take 1 Tab by mouth nightly.  lisinopril-hydroCHLOROthiazide (PRINZIDE, ZESTORETIC) 20-25 mg per tablet Take 1 Tab by mouth daily.  metFORMIN (GLUCOPHAGE) 1,000 mg tablet Take 1 Tab by mouth two (2) times daily (with meals).  cholecalciferol (VITAMIN D3) 1,000 unit tablet Take 1 Tab by mouth daily. Indications: Vitamin D Deficiency  insulin NPH (NOVOLIN N, HUMULIN N) 100 unit/mL injection Inject 35 units twice a day for DM-2 (250.00)  Indications: type 2 diabetes mellitus  nicotinic acid (NIACIN) 500 mg tablet Take 500 mg by mouth Daily (before breakfast).  insulin syringe-needle U-100 Dispense microfine 1 mL syringes with 30 gauge needle. Inject 23 units NPH twice a day for DM-2 (250.00)  Insulin Needles, Disposable, 31 X 5/16 \" ndle Inject hs for DM-2 250.00  
 glucose blood VI test strips (BLOOD GLUCOSE TEST) strip Test TID for DM-2 treated with insulin. (Patient taking differently: Test once a day for DM-2 treated with insulin.)  Lancets misc Test TID for DM-2 treated with insulin (250.00)  omega-3 fatty acids-vitamin e (FISH OIL) 1,000 mg cap Take 2 Caps by mouth two (2) times a day.  aspirin (ASPIRIN) 325 mg tablet Take 1 Tab by mouth daily. No current facility-administered medications for this visit. Medications Discontinued During This Encounter Medication Reason  verapamil ER (CALAN-SR) 180 mg CR tablet Reorder  metoprolol tartrate (LOPRESSOR) 50 mg tablet Reorder

## 2019-01-09 LAB
BUN SERPL-MCNC: 25 MG/DL (ref 8–27)
BUN/CREAT SERPL: 24 (ref 10–24)
CALCIUM SERPL-MCNC: 9.9 MG/DL (ref 8.6–10.2)
CHLORIDE SERPL-SCNC: 100 MMOL/L (ref 96–106)
CHOLEST SERPL-MCNC: 141 MG/DL (ref 100–199)
CO2 SERPL-SCNC: 19 MMOL/L (ref 20–29)
CREAT SERPL-MCNC: 1.06 MG/DL (ref 0.76–1.27)
EST. AVERAGE GLUCOSE BLD GHB EST-MCNC: 237 MG/DL
GLUCOSE SERPL-MCNC: 167 MG/DL (ref 65–99)
HBA1C MFR BLD: 9.9 % (ref 4.8–5.6)
HDLC SERPL-MCNC: 33 MG/DL
LDLC SERPL CALC-MCNC: 66 MG/DL (ref 0–99)
Lab: NORMAL
POTASSIUM SERPL-SCNC: 4.2 MMOL/L (ref 3.5–5.2)
SODIUM SERPL-SCNC: 138 MMOL/L (ref 134–144)
TRIGL SERPL-MCNC: 208 MG/DL (ref 0–149)
VLDLC SERPL CALC-MCNC: 42 MG/DL (ref 5–40)

## 2019-01-10 ENCOUNTER — TELEPHONE (OUTPATIENT)
Dept: FAMILY MEDICINE CLINIC | Age: 64
End: 2019-01-10

## 2019-01-10 NOTE — TELEPHONE ENCOUNTER
Attempted to call patient. No answer. Voicemail left to return call. Need to advise per Dr. James Guzman:  *Your HbA1c has not improved. I would like to start you on a different medication called a GLP-1 agonist. The GLP- 1 agonist help your body use insulin insulin more effectively. It is an injection. If you have an questions, please let me know. *    Also need to check if he has insurance? If not, I can apply give him paperwork to get assistance for new medication.

## 2019-01-14 NOTE — TELEPHONE ENCOUNTER
Spoke with patient, advised of message from Dr. Azam Worley. Patient would like to start GLP1-agonist. Patient does not have prescription coverage. Will give patient application for medication assistance. Printed script needed for application.

## 2019-02-11 ENCOUNTER — TELEPHONE (OUTPATIENT)
Dept: FAMILY MEDICINE CLINIC | Age: 64
End: 2019-02-11

## 2019-02-11 NOTE — TELEPHONE ENCOUNTER
Call placed to patient. He advises he was approved for medication assistance but med assist is waiting on something from us. Will call med assist to see what is needed from us.

## 2019-02-11 NOTE — TELEPHONE ENCOUNTER
Pt called and need to speak to you about his medical assistance. Very important. Need verification of something. [phone].  Thanks

## 2019-02-11 NOTE — TELEPHONE ENCOUNTER
Spoke with Jose Lynn at [employer]. They have not received anything regarding approval yet.  Application was received on 2/6/2019 and she states it can take 1-2 weeks for approval.

## 2019-03-05 ENCOUNTER — TELEPHONE (OUTPATIENT)
Dept: FAMILY MEDICINE CLINIC | Age: 64
End: 2019-03-05

## 2019-03-05 NOTE — TELEPHONE ENCOUNTER
Patient states he took only 30 units of his insulin this morning. His blood sugar was 127 after breakfast and at around 2 pm BS was 71. States this was the last day of his dosage being 1.2 on Victoza. Increases tomorrow. Patient states the past 4 days, he believes his sugar has dropped as well although he did not have his monitor with him to check his sugar, he did feel jittery and lethargic. Patient is concerned he is taking too much medication.

## 2019-03-05 NOTE — TELEPHONE ENCOUNTER
Please inform the patient that he can reduce his dose of insulin to 32 units twice a day. And continue to monitor. Please monitor morning fasting blood glcose and at least 1 other sugar throughout the day and call with results on Friday.      Lorie Duggan MD

## 2019-03-11 ENCOUNTER — TELEPHONE (OUTPATIENT)
Dept: FAMILY MEDICINE CLINIC | Age: 64
End: 2019-03-11

## 2019-03-11 NOTE — TELEPHONE ENCOUNTER
These number look much better overall. As long as the patient is not having any long episodes of dizziness or lightheadedness, please continue at current dose. If symptoms reoccur, he may reduce insulin by 5 units at each injection and call office immediately.      Tracey Lopez MD

## 2019-03-11 NOTE — TELEPHONE ENCOUNTER
Albaro Healy called to give pt Blood sugar readings. 3/5  Before breakfast:127  2:15pm-71  After dinner-127    3/6  8am-102  After dinner-77    3/7  Before breakfast-73  Before dinner-106    3/8  Before breakfast-66    3/9  Before breakfast-98  Before dinner-86    3/10  Before breakfast-80  After dinner-113    3/11  Before breakfast-97    Ari Velasquez reports that pt has not had anymore spells of blood sugar dropping and he has been taking 32 cc of insulin.

## 2019-03-18 ENCOUNTER — TELEPHONE (OUTPATIENT)
Dept: FAMILY MEDICINE CLINIC | Age: 64
End: 2019-03-18

## 2019-03-18 NOTE — TELEPHONE ENCOUNTER
Please check with patient what dose he was taking when he developed that indigestion.     Syed Knox MD

## 2019-03-18 NOTE — TELEPHONE ENCOUNTER
Stopped taking Victoza Friday morning. Gave patient severe indigestion. Symptoms were relieved when he stopped taking it. Patient not sure if you wanted to prescribe anything else or make any other changes.

## 2019-03-18 NOTE — TELEPHONE ENCOUNTER
Called and spoke to patient. He states he was taking 1.8mg of Victoza for approx 2 weeks. He reports that he noticed some indigestion prior to starting the 1.8mg but it was much worse once he increased it.

## 2019-03-18 NOTE — TELEPHONE ENCOUNTER
Please advise patient to restart medication beginning at the 0.6 mg dose for a week and increasing to 1.2 mg if he can tolerate that dose. Even the lower dose of the medication, will help improve his blood sugar.      Sancho Monsivais MD

## 2019-03-19 NOTE — TELEPHONE ENCOUNTER
Called and spoke to patient. Advised per Dr. James Guzman:    Please advise patient to restart medication beginning at the 0.6 mg dose for a week and increasing to 1.2 mg if he can tolerate that dose. Even the lower dose of the medication, will help improve his blood sugar. He verbalized understanding and states he will call upon any other problems with the medication.

## 2019-04-22 ENCOUNTER — OFFICE VISIT (OUTPATIENT)
Dept: FAMILY MEDICINE CLINIC | Age: 64
End: 2019-04-22

## 2019-04-22 VITALS
TEMPERATURE: 95.5 F | SYSTOLIC BLOOD PRESSURE: 148 MMHG | DIASTOLIC BLOOD PRESSURE: 85 MMHG | WEIGHT: 315 LBS | HEIGHT: 73 IN | OXYGEN SATURATION: 92 % | BODY MASS INDEX: 41.75 KG/M2 | RESPIRATION RATE: 20 BRPM | HEART RATE: 90 BPM

## 2019-04-22 DIAGNOSIS — E78.5 HYPERLIPIDEMIA, UNSPECIFIED HYPERLIPIDEMIA TYPE: ICD-10-CM

## 2019-04-22 DIAGNOSIS — I10 ESSENTIAL HYPERTENSION: ICD-10-CM

## 2019-04-22 LAB — HBA1C MFR BLD HPLC: 7.4 %

## 2019-04-22 RX ORDER — PRAVASTATIN SODIUM 40 MG/1
40 TABLET ORAL
Qty: 90 TAB | Refills: 1 | Status: SHIPPED | OUTPATIENT
Start: 2019-04-22 | End: 2020-01-27

## 2019-04-22 RX ORDER — LISINOPRIL AND HYDROCHLOROTHIAZIDE 20; 25 MG/1; MG/1
1 TABLET ORAL DAILY
Qty: 90 TAB | Refills: 1 | Status: SHIPPED | OUTPATIENT
Start: 2019-04-22 | End: 2019-10-30 | Stop reason: SDUPTHER

## 2019-04-22 RX ORDER — GLIPIZIDE 10 MG/1
10 TABLET ORAL 2 TIMES DAILY
Qty: 180 TAB | Refills: 1 | Status: SHIPPED | OUTPATIENT
Start: 2019-04-22 | End: 2019-11-26 | Stop reason: SDUPTHER

## 2019-04-22 NOTE — PATIENT INSTRUCTIONS
[address]  1 [facility]. [phone]      Recent Results (from the past 24 hour(s))   AMB POC HEMOGLOBIN A1C    Collection Time: 04/22/19  8:37 AM   Result Value Ref Range    Hemoglobin A1c (POC) 7.4 %     Learning About Meal Planning for Diabetes  Why plan your meals? Meal planning can be a key part of managing diabetes. Planning meals and snacks with the right balance of carbohydrate, protein, and fat can help you keep your blood sugar at the target level you set with your doctor. You don't have to eat special foods. You can eat what your family eats, including sweets once in a while. But you do have to pay attention to how often you eat and how much you eat of certain foods. You may want to work with a dietitian or a certified diabetes educator. He or she can give you tips and meal ideas and can answer your questions about meal planning. This health professional can also help you reach a healthy weight if that is one of your goals. What plan is right for you? Your dietitian or diabetes educator may suggest that you start with the plate format or carbohydrate counting. The plate format  The plate format is a simple way to help you manage how you eat. You plan meals by learning how much space each food should take on a plate. Using the plate format helps you spread carbohydrate throughout the day. It can make it easier to keep your blood sugar level within your target range. It also helps you see if you're eating healthy portion sizes. To use the plate format, you put non-starchy vegetables on half your plate. Add meat or meat substitutes on one-quarter of the plate. Put a grain or starchy vegetable (such as brown rice or a potato) on the final quarter of the plate.  You can add a small piece of fruit and some low-fat or fat-free milk or yogurt, depending on your carbohydrate goal for each meal.  Here are some tips for using the plate format:  · Make sure that you are not using an oversized plate. A 9-inch plate is best. Many restaurants use larger plates. · Get used to using the plate format at home. Then you can use it when you eat out. · Write down your questions about using the plate format. Talk to your doctor, a dietitian, or a diabetes educator about your concerns. Carbohydrate counting  With carbohydrate counting, you plan meals based on the amount of carbohydrate in each food. Carbohydrate raises blood sugar higher and more quickly than any other nutrient. It is found in desserts, breads and cereals, and fruit. It's also found in starchy vegetables such as potatoes and corn, grains such as rice and pasta, and milk and yogurt. Spreading carbohydrate throughout the day helps keep your blood sugar levels within your target range. Your daily amount depends on several things, including your weight, how active you are, which diabetes medicines you take, and what your goals are for your blood sugar levels. A registered dietitian or diabetes educator can help you plan how much carbohydrate to include in each meal and snack. A guideline for your daily amount of carbohydrate is:  · 45 to 60 grams at each meal. That's about the same as 3 to 4 carbohydrate servings. · 15 to 20 grams at each snack. That's about the same as 1 carbohydrate serving. The Nutrition Facts label on packaged foods tells you how much carbohydrate is in a serving of the food. First, look at the serving size on the food label. Is that the amount you eat in a serving? All of the nutrition information on a food label is based on that serving size. So if you eat more or less than that, you'll need to adjust the other numbers. Total carbohydrate is the next thing you need to look for on the label. If you count carbohydrate servings, one serving of carbohydrate is 15 grams.   For foods that don't come with labels, such as fresh fruits and vegetables, you'll need a guide that lists carbohydrate in these foods. Ask your doctor, dietitian, or diabetes educator about books or other nutrition guides you can use. If you take insulin, you need to know how many grams of carbohydrate are in a meal. This lets you know how much rapid-acting insulin to take before you eat. If you use an insulin pump, you get a constant rate of insulin during the day. So the pump must be programmed at meals to give you extra insulin to cover the rise in blood sugar after meals. When you know how much carbohydrate you will eat, you can take the right amount of insulin. Or, if you always use the same amount of insulin, you need to make sure that you eat the same amount of carbohydrate at meals. If you need more help to understand carbohydrate counting and food labels, ask your doctor, dietitian, or diabetes educator. How do you get started with meal planning? Here are some tips to get started:  · Plan your meals a week at a time. Don't forget to include snacks too. · Use cookbooks or online recipes to plan several main meals. Plan some quick meals for busy nights. You also can double some recipes that freeze well. Then you can save half for other busy nights when you don't have time to cook. · Make sure you have the ingredients you need for your recipes. If you're running low on basic items, put these items on your shopping list too. · List foods that you use to make breakfasts, lunches, and snacks. List plenty of fruits and vegetables. · Post this list on the refrigerator. Add to it as you think of more things you need. · Take the list to the store to do your weekly shopping. Follow-up care is a key part of your treatment and safety. Be sure to make and go to all appointments, and call your doctor if you are having problems. It's also a good idea to know your test results and keep a list of the medicines you take. Where can you learn more? Go to http://live-carmen.info/.   Chauncey Ball in the search box to learn more about \"Learning About Meal Planning for Diabetes. \"  Current as of: July 25, 2018  Content Version: 11.9  © 5133-0350 [employer]. Care instructions adapted under license by [employer] (which disclaims liability or warranty for this information). If you have questions about a medical condition or this instruction, always ask your healthcare professional. [address] any warranty or liability for your use of this information.

## 2019-04-22 NOTE — PROGRESS NOTES
Олег Soriano  66 y.o. male  1955  [address]  [phone]     [facility] 23: Progress Note       Encounter Date: 4/22/2019    Chief Complaint   Patient presents with    Follow-up       History provided by patient  History of Present Illness   Олег Soriano is a 66 y.o. male who presents to clinic today for:    Diabetes Follow up: Improved   Overall the patient feels he is doing better back on Victoza. He back to taking to. Diabetic Consultants:Endocrinologist - N/A   Therapy:metformin, glizipide, NPH 35 units BID and victoza. Medication compliance:Good   Frequency of home glucose testing: Daily   Blood Sugar range at home:    Polyuria, polyphagia or polydipsia: NO   Low blood sugar symptoms: after 4 hours post-prandial, feels shaky and will eat a pck of nabs. Dietary compliance: Fair   On ASA: Yes   Pertinent Labs:      Hemoglobin A1c   Date Value Ref Range Status   01/08/2019 9.9 (H) 4.8 - 5.6 % Final     Comment:              Prediabetes: 5.7 - 6.4           Diabetes: >6.4           Glycemic control for adults with diabetes: <7.0       Estimated average glucose   Date Value Ref Range Status   01/08/2019 237 mg/dL Final       Hypertension: Stable   BP Readings from Last 3 Encounters:   04/22/19 148/85   01/08/19 139/87   10/08/18 155/82     The patient reports:  taking medications as instructed, no medication side effects noted, no TIA's, no chest pain on exertion, no dyspnea on exertion, no swelling of ankles. Home monitoring:No  Sodium   Date Value Ref Range Status   01/08/2019 138 134 - 144 mmol/L Final     Potassium   Date Value Ref Range Status   01/08/2019 4.2 3.5 - 5.2 mmol/L Final     Creatinine   Date Value Ref Range Status   01/08/2019 1.06 0.76 - 1.27 mg/dL Final   05/29/2018 1.07 0.76 - 1.27 mg/dL Final       Our goal is to normalize the blood pressure to decrease the risks of strokes and heart attacks.  The patient is in agreement with the plan. Hyperlipidemia:  Stable  Cardiovascular risks for him are: diabetic  hypertension  hyperlipidemia  obese. Currently he takes Pravachol (pravastatin)   Myalgias: No  Cholesterol, total   Date Value Ref Range Status   01/08/2019 141 100 - 199 mg/dL Final     HDL Cholesterol   Date Value Ref Range Status   01/08/2019 33 (L) >39 mg/dL Final     LDL, calculated   Date Value Ref Range Status   01/08/2019 66 0 - 99 mg/dL Final     Triglyceride   Date Value Ref Range Status   01/08/2019 208 (H) 0 - 149 mg/dL Final     ALT (SGPT)   Date Value Ref Range Status   10/27/2017 35 0 - 44 IU/L Final     AST (SGOT)   Date Value Ref Range Status   10/27/2017 17 0 - 40 IU/L Final     Alk. phosphatase   Date Value Ref Range Status   10/27/2017 59 39 - 117 IU/L Final     Review of Systems   Review of Systems   Constitutional: Negative for chills and fever. Gastrointestinal: Negative for abdominal pain, diarrhea, nausea and vomiting. Genitourinary: Negative for dysuria, frequency, hematuria and urgency. Skin: Negative for itching and rash. Neurological: Negative for dizziness, tingling, focal weakness, seizures, weakness and headaches. Vitals/Objective:     Vitals:    04/22/19 0805   BP: 148/85   Pulse: 90   Resp: 20   Temp: 95.5 °F (35.3 °C)   TempSrc: Oral   SpO2: 92%   Weight: 322 lb (146.1 kg)   Height: 6' 1\" (1.854 m)     Body mass index is 42.48 kg/m². Wt Readings from Last 3 Encounters:   04/22/19 322 lb (146.1 kg)   01/08/19 328 lb (148.8 kg)   10/08/18 331 lb (150.1 kg)       Physical Exam   Constitutional: He is oriented to person, place, and time. He appears well-developed and well-nourished. HENT:   Head: Normocephalic and atraumatic. Right Ear: External ear normal.   Left Ear: External ear normal.   Cardiovascular: Normal rate and regular rhythm. Pulmonary/Chest: Effort normal and breath sounds normal.   Musculoskeletal: Normal range of motion.  He exhibits no edema or tenderness. Neurological: He is alert and oriented to person, place, and time. Skin: Skin is warm. No erythema. No pallor. Recent Results (from the past 24 hour(s))   AMB POC HEMOGLOBIN A1C    Collection Time: 04/22/19  8:37 AM   Result Value Ref Range    Hemoglobin A1c (POC) 7.4 %     Assessment and Plan:     Encounter Diagnoses     ICD-10-CM ICD-9-CM   1. Uncontrolled type 2 diabetes mellitus with diabetic nephropathy, with long-term current use of insulin ([facility]) E11.21 250.42    E11.65 583.81    Z79.4 V58.67   2. Hyperlipidemia, unspecified hyperlipidemia type E78.5 272.4   3. Essential hypertension I10 401.9       1. Uncontrolled type 2 diabetes mellitus with diabetic nephropathy, with long-term current use of insulin ([facility])  HbA1c almost at goal. Advised patient to check log of BS and begin to wean insulin by 2 units if he is having BS under 70.   - AMB POC HEMOGLOBIN A1C  - COLLECTION CAPILLARY BLOOD SPECIMEN  - glipiZIDE (GLUCOTROL) 10 mg tablet; Take 1 Tab by mouth two (2) times a day. Dispense: 180 Tab; Refill: 1  - liraglutide (VICTOZA) 0.6 mg/0.1 mL (18 mg/3 mL) pnij; 1.2 mg by SubCUTAneous route daily. Increase each week by 1 click on pen as symptoms tolerate. Indications: type 2 diabetes mellitus  Dispense: 5 Pen; Refill: 0    2. Hyperlipidemia, unspecified hyperlipidemia type  - pravastatin (PRAVACHOL) 40 mg tablet; Take 1 Tab by mouth nightly. Dispense: 90 Tab; Refill: 1    3. Essential hypertension  - lisinopril-hydroCHLOROthiazide (PRINZIDE, ZESTORETIC) 20-25 mg per tablet; Take 1 Tab by mouth daily. Dispense: 90 Tab; Refill: 1      I have discussed the diagnosis with the patient and the intended plan as seen in the above orders. he has expressed understanding. The patient has received an after-visit summary and questions were answered concerning future plans. I have discussed medication side effects and warnings with the patient as well.     Electronically Signed: Sophie Hoskins MD     History/Allergies   Patients past medical, surgical and family histories were reviewed and updated. Past Medical History:   Diagnosis Date    Hypercholesterolemia     Hypertension       Past Surgical History:   Procedure Laterality Date    HX ORTHOPAEDIC  2004    rt shoulder surgery    HX ORTHOPAEDIC      fx lft arm     Family History   Problem Relation Age of Onset    Diabetes Father     Hypertension Father      Social History     Tobacco Use    Smoking status: Former Smoker     Last attempt to quit: 1992     Years since quittin.9    Smokeless tobacco: Never Used   Substance Use Topics    Alcohol use: No    Drug use: No          Allergies   Allergen Reactions    Cyclobenzaprine Other (comments)     Leg cramps    Percocet [Oxycodone-Acetaminophen] Nausea Only       Disposition     Follow-up and Dispositions  ·   Return in about 3 months (around 2019) for Routine with blood work. No future appointments. Current Medications after this visit     Current Outpatient Medications   Medication Sig    pravastatin (PRAVACHOL) 40 mg tablet Take 1 Tab by mouth nightly.  glipiZIDE (GLUCOTROL) 10 mg tablet Take 1 Tab by mouth two (2) times a day.  lisinopril-hydroCHLOROthiazide (PRINZIDE, ZESTORETIC) 20-25 mg per tablet Take 1 Tab by mouth daily.  liraglutide (VICTOZA) 0.6 mg/0.1 mL (18 mg/3 mL) pnij 1.2 mg by SubCUTAneous route daily. Increase each week by 1 click on pen as symptoms tolerate. Indications: type 2 diabetes mellitus    insulin NPH (NOVOLIN N, HUMULIN N) 100 unit/mL injection Inject 32 units twice a day for DM-2 (250.00)    verapamil ER (CALAN-SR) 180 mg CR tablet Take 1 Tab by mouth nightly.  metoprolol tartrate (LOPRESSOR) 50 mg tablet Take 1 Tab by mouth two (2) times a day.  metFORMIN (GLUCOPHAGE) 1,000 mg tablet Take 1 Tab by mouth two (2) times daily (with meals).  cholecalciferol (VITAMIN D3) 1,000 unit tablet Take 1 Tab by mouth daily. Indications: Vitamin D Deficiency    nicotinic acid (NIACIN) 500 mg tablet Take 500 mg by mouth Daily (before breakfast).  Insulin Needles, Disposable, 31 X 5/16 \" ndle Inject hs for DM-2 250.00    glucose blood VI test strips (BLOOD GLUCOSE TEST) strip Test TID for DM-2 treated with insulin. (Patient taking differently: Test once a day for DM-2 treated with insulin.)    Lancets misc Test TID for DM-2 treated with insulin (250.00)    omega-3 fatty acids-vitamin e (FISH OIL) 1,000 mg cap Take 2 Caps by mouth two (2) times a day.  aspirin (ASPIRIN) 325 mg tablet Take 1 Tab by mouth daily. No current facility-administered medications for this visit.       Medications Discontinued During This Encounter   Medication Reason    liraglutide (VICTOZA) 0.6 mg/0.1 mL (18 mg/3 mL) pnij Reorder    pravastatin (PRAVACHOL) 40 mg tablet Reorder    glipiZIDE (GLUCOTROL) 10 mg tablet Reorder    lisinopril-hydroCHLOROthiazide (PRINZIDE, ZESTORETIC) 20-25 mg per tablet Reorder    liraglutide (VICTOZA) 0.6 mg/0.1 mL (18 mg/3 mL) pnij

## 2019-04-22 NOTE — PROGRESS NOTES
1. Have you been to the ER, urgent care clinic since your last visit? Hospitalized since your last visit? No    2. Have you seen or consulted any other health care providers outside of the [address] [place] [address] since your last visit? Include any pap smears or colon screening.  No  Reviewed record in preparation for visit and have necessary documentation  Pt did not bring medication to office visit for review    Goals that were addressed and/or need to be completed during or after this appointment include     Health Maintenance Due   Topic Date Due    Pneumococcal 0-64 years (1 of 1 - PPSV23) 06/07/1963    Shingrix Vaccine Age 50> (1 of 2) 06/07/2007    DTaP/Tdap/Td series (1 - Tdap) 01/22/2011    MICROALBUMIN Q1  01/25/2019

## 2019-06-14 RX ORDER — METFORMIN HYDROCHLORIDE 1000 MG/1
TABLET ORAL
Qty: 180 TAB | Refills: 0 | Status: SHIPPED | OUTPATIENT
Start: 2019-06-14 | End: 2019-09-27 | Stop reason: SDUPTHER

## 2019-06-24 ENCOUNTER — TELEPHONE (OUTPATIENT)
Dept: FAMILY MEDICINE CLINIC | Age: 64
End: 2019-06-24

## 2019-06-24 NOTE — TELEPHONE ENCOUNTER
Call made to pt, no answer, mess left. Medication is here and ready for pickup. Please  ASAP because refrigerator is full.

## 2019-07-16 ENCOUNTER — OFFICE VISIT (OUTPATIENT)
Dept: FAMILY MEDICINE CLINIC | Age: 64
End: 2019-07-16

## 2019-07-16 VITALS
TEMPERATURE: 97.6 F | HEART RATE: 85 BPM | OXYGEN SATURATION: 95 % | WEIGHT: 315 LBS | RESPIRATION RATE: 18 BRPM | HEIGHT: 73 IN | BODY MASS INDEX: 41.75 KG/M2 | DIASTOLIC BLOOD PRESSURE: 69 MMHG | SYSTOLIC BLOOD PRESSURE: 126 MMHG

## 2019-07-16 DIAGNOSIS — E11.21 TYPE 2 DIABETES MELLITUS WITH DIABETIC NEPHROPATHY, WITH LONG-TERM CURRENT USE OF INSULIN (HCC): Primary | ICD-10-CM

## 2019-07-16 DIAGNOSIS — E16.2 HYPOGLYCEMIA: ICD-10-CM

## 2019-07-16 DIAGNOSIS — Z79.4 TYPE 2 DIABETES MELLITUS WITH DIABETIC NEPHROPATHY, WITH LONG-TERM CURRENT USE OF INSULIN (HCC): Primary | ICD-10-CM

## 2019-07-16 NOTE — PROGRESS NOTES
1. Have you been to the ER, urgent care clinic since your last visit? Hospitalized since your last visit? No    2. Have you seen or consulted any other health care providers outside of the [address] [place] [address] since your last visit? Include any pap smears or colon screening.  No

## 2019-07-16 NOTE — PROGRESS NOTES
Julissa Dennison  60 y.o. male  1955  CCN:[id number]    [facility] MEDICINE  Progress Note     Encounter Date: 7/16/2019    Assessment and Plan:     Encounter Diagnoses     ICD-10-CM ICD-9-CM   1. Type 2 diabetes mellitus with diabetic nephropathy, with long-term current use of insulin (HCC) E11.21 250.40       1. Type 2 diabetes mellitus with diabetic nephropathy, with long-term current use of insulin ([address]  2. Hypoglycemia  Stopping insulin at this time. Patient;s blood sugar dropping since he was able to increase dose of trulicity to 1.8 mg. He agreed to close monitoring of blood sugars and will call in 1-2 weeks with readings. If needed may add back smaller dose of NPH though goal is to reduce number of medications as his control improves. - glucose blood VI test strips (BLOOD GLUCOSE TEST) strip; Test once a day for DM-2 treated with insulin. Dispense: 100 Strip; Refill: 1  - METABOLIC PANEL, BASIC  - HEMOGLOBIN A1C WITH EAG  - LIPID PANEL      I have discussed the diagnosis with the patient and the intended plan as seen in the above orders. he has expressed understanding. The patient has received an after-visit summary and questions were answered concerning future plans. I have discussed medication side effects and warnings with the patient as well. Electronically Signed: Jennifer Chowdhury MD    Current Medications after this visit     Current Outpatient Medications   Medication Sig    metFORMIN (GLUCOPHAGE) 1,000 mg tablet TAKE 1 TABLET BY MOUTH TWICE DAILY WITH  MEALS    pravastatin (PRAVACHOL) 40 mg tablet Take 1 Tab by mouth nightly.  glipiZIDE (GLUCOTROL) 10 mg tablet Take 1 Tab by mouth two (2) times a day.  lisinopril-hydroCHLOROthiazide (PRINZIDE, ZESTORETIC) 20-25 mg per tablet Take 1 Tab by mouth daily.  liraglutide (VICTOZA) 0.6 mg/0.1 mL (18 mg/3 mL) pnij 1.2 mg by SubCUTAneous route daily. Increase each week by 1 click on pen as symptoms tolerate. Indications: type 2 diabetes mellitus    insulin NPH (NOVOLIN N, HUMULIN N) 100 unit/mL injection Inject 32 units twice a day for DM-2 (250.00)    verapamil ER (CALAN-SR) 180 mg CR tablet Take 1 Tab by mouth nightly.  metoprolol tartrate (LOPRESSOR) 50 mg tablet Take 1 Tab by mouth two (2) times a day.  cholecalciferol (VITAMIN D3) 1,000 unit tablet Take 1 Tab by mouth daily. Indications: Vitamin D Deficiency    nicotinic acid (NIACIN) 500 mg tablet Take 500 mg by mouth Daily (before breakfast).  Insulin Needles, Disposable, 31 X 5/16 \" ndle Inject hs for DM-2 250.00    glucose blood VI test strips (BLOOD GLUCOSE TEST) strip Test TID for DM-2 treated with insulin. (Patient taking differently: Test once a day for DM-2 treated with insulin.)    Lancets misc Test TID for DM-2 treated with insulin (250.00)    omega-3 fatty acids-vitamin e (FISH OIL) 1,000 mg cap Take 2 Caps by mouth two (2) times a day.  aspirin (ASPIRIN) 325 mg tablet Take 1 Tab by mouth daily. No current facility-administered medications for this visit. There are no discontinued medications. ~~~~~~~~~~~~~~~~~~~~~~~~~~~~~~~~~~~~~~~~~~~~~~    Chief Complaint   Patient presents with   [facility].    Medication Evaluation       History provided by patient  History of Present Illness   Julissa Dennison is a 60 y.o. male who presents to clinic today for:    Diabetes Follow up: Improved   Overall the patient feels his dietary compliance isGood. Patient reports that when he is checking his sugars it has been ranging 60-70s. Endorses episodes of sweating. He dropped NPH to 30 units BID. He had ran out of insulin over the weekend. He had 1 episode where he woke up in the middle of the night, BS 57 he ate a bowl of cereal and it resolved.      Diabetic Consultants:Endocrinologist - N/A   Last HbA1c:   Lab Results   Component Value Date/Time    Hemoglobin A1c 9.9 (H) 01/08/2019 12:24 PM    Hemoglobin A1c (POC) 7.4 04/22/2019 08:37 AM   Health Maintenance  Health Maintenance Due   Topic Date Due    Pneumococcal 0-64 years (1 of 1 - PPSV23) 06/07/1963    Shingrix Vaccine Age 50> (1 of 2) 06/07/2007    DTaP/Tdap/Td series (1 - Tdap) 01/22/2011    MICROALBUMIN Q1  01/25/2019    FOOT EXAM Q1  05/29/2019    FOBT Q 1 YEAR AGE 50-75  06/25/2019     Review of Systems   Review of Systems   Constitutional: Positive for chills, diaphoresis, malaise/fatigue and weight loss. Negative for fever. Cardiovascular: Positive for palpitations. Negative for chest pain and leg swelling. Gastrointestinal: Negative for abdominal pain, nausea and vomiting. Skin: Negative for itching and rash. Neurological: Positive for dizziness. Negative for tingling, tremors and headaches. Vitals/Objective:     Vitals:    07/16/19 1041   BP: 126/69   Pulse: 85   Resp: 18   Temp: 97.6 °F (36.4 °C)   TempSrc: Oral   SpO2: 95%   Weight: 317 lb (143.8 kg)   Height: 6' 1\" (1.854 m)     Body mass index is 41.82 kg/m². Wt Readings from Last 3 Encounters:   07/16/19 317 lb (143.8 kg)   04/22/19 322 lb (146.1 kg)   01/08/19 328 lb (148.8 kg)       Physical Exam   Constitutional: He is oriented to person, place, and time. He appears well-developed and well-nourished. HENT:   Head: Normocephalic and atraumatic. Right Ear: External ear normal.   Left Ear: External ear normal.   Cardiovascular: Normal rate and regular rhythm. Pulmonary/Chest: Effort normal and breath sounds normal.   Musculoskeletal: Normal range of motion. He exhibits no edema or tenderness. Neurological: He is alert and oriented to person, place, and time. Skin: Skin is warm. No erythema. No pallor. No results found for this or any previous visit (from the past 24 hour(s)). Disposition     No future appointments. History   Patient's past medical, surgical and family histories were reviewed and updated.     Past Medical History:   Diagnosis Date    Hypercholesterolemia     Hypertension      Past Surgical History:   Procedure Laterality Date    HX ORTHOPAEDIC  2004    rt shoulder surgery    HX ORTHOPAEDIC      fx lft arm     Family History   Problem Relation Age of Onset    Diabetes Father     Hypertension Father      Social History     Tobacco Use    Smoking status: Former Smoker     Last attempt to quit: 1992     Years since quittin.1    Smokeless tobacco: Never Used   Substance Use Topics    Alcohol use: No    Drug use: No       Allergies     Allergies   Allergen Reactions    Cyclobenzaprine Other (comments)     Leg cramps    Percocet [Oxycodone-Acetaminophen] Nausea Only

## 2019-07-26 ENCOUNTER — TELEPHONE (OUTPATIENT)
Dept: FAMILY MEDICINE CLINIC | Age: 64
End: 2019-07-26

## 2019-07-26 NOTE — TELEPHONE ENCOUNTER
It appears that your fasting blood sugar is no longer dropping as low as prior. Please continue to HOLD your insulin. We will repeat your blood work in 3 months. Please call sooner if morning blood sugars drop below 100s.      Sophia Yeager MD

## 2019-07-26 NOTE — TELEPHONE ENCOUNTER
Pt called stating he was told to keep track of his glucose levels for the week and is reporting in. States the first number is before breakfast and before taking Victoza, second number is after dinner.     17th- 69, 126  18th 71, 109  19th- 76, 210  20th- 138, 141  21st- 110, 110  22nd- 123, 107  23rd- 121, 105  24th- 106, 106

## 2019-09-10 ENCOUNTER — DOCUMENTATION ONLY (OUTPATIENT)
Dept: FAMILY MEDICINE CLINIC | Age: 64
End: 2019-09-10

## 2019-09-10 NOTE — PROGRESS NOTES
. Beryle Overland called for a refill; however, he is not our patient. I returned call and talked with him and told him he is welcome to come to the [address] [place] [address] since he has not health insurance. He is going to call back if he can't fine a provider closer to his location.     Radha Jamison

## 2019-09-11 NOTE — TELEPHONE ENCOUNTER
Is there anything in particular I need to do for this PAP program? Send it anything specifically?       Srini Kelley MD

## 2019-09-11 NOTE — TELEPHONE ENCOUNTER
Patient uses PAP program.  He requested a refill thru the PAP assistance program per Josette Garcia.

## 2019-09-12 NOTE — TELEPHONE ENCOUNTER
I've reached out to Josette Garcia that oversees the PAP program.  I asked her to reach out to Jessy Stephens to let her know how the refill should be processed for the patient to receive it thru the program.

## 2019-09-23 ENCOUNTER — TELEPHONE (OUTPATIENT)
Dept: FAMILY MEDICINE CLINIC | Age: 64
End: 2019-09-23

## 2019-09-23 NOTE — TELEPHONE ENCOUNTER
Telephone message left for the patient on his name identified voicemail giving him an update on his refill request for Victoza. Patient was seeing Dr. Gilma Davis at [facility] and they have enrolled in the [address] [place] [address] administered pharmacy assistance program.    However, patient is now seeing Dr. Gilma Davis at [address] [facility]. I spoke with the practice manager, Tai Finch, today about the pharmacy assistance program and faxed her the [facility] and a copy of the PAP application for patients.       Once they enroll in the [address] [place] [address] PAP program, I can send in his Victoza refill request. Rene Celeste RN

## 2019-09-27 RX ORDER — METFORMIN HYDROCHLORIDE 1000 MG/1
TABLET ORAL
Qty: 180 TAB | Refills: 0 | Status: SHIPPED | OUTPATIENT
Start: 2019-09-27 | End: 2019-10-30 | Stop reason: SDUPTHER

## 2019-10-01 ENCOUNTER — TELEPHONE (OUTPATIENT)
Dept: FAMILY MEDICINE CLINIC | Age: 64
End: 2019-10-01

## 2019-10-01 NOTE — TELEPHONE ENCOUNTER
[address] [facility] to her Nash Schwarz last week regarding the Pharmacy assistance program.  Patient has a refill of Victoza (trulicity) that is needed. [phone].  4

## 2019-10-01 NOTE — TELEPHONE ENCOUNTER
Telephone message left for Maribell Market in follow-up to our conversation and the information I faxed to her last week about the new pharmacy assistance program offered by the [address] [place] [address] staff for interested [address] [place] [address]. Jeanne Davis, [employer], was the patient advocate for the program until her detention in July. At that time, the [address] [place] [address] prepared to assume her duties for our patients and was asked to offer the service to the medical groups as well. This patient needs a refill of his PAP Victoza. The PAP application that Jeanne Davis submitted for him is good until 02-09-20. In order to request his refill, I need the completed practice enrollment form and provider list page faxed to me please.  Jennifer Don RN

## 2019-10-22 ENCOUNTER — TELEPHONE (OUTPATIENT)
Dept: FAMILY MEDICINE CLINIC | Age: 64
End: 2019-10-22

## 2019-10-22 NOTE — TELEPHONE ENCOUNTER
Telephone call made to [address] [facility] to check on the status of the PAP Victoza prescription needing Dr. Nando Dias signature that I faxed to them on 10-03-19. I spoke with Pavan Gutiérrez who asked that I fax the prescription to them again, [phone], and he will give the message to [facility]. Prescription faxed and fax confirmation received. Return call made to the patient to let him know that Dr. Nando Dias office and I are still working on getting his Victoza ordered through PAP. He stated that he has enough left for about 3-4 weeks and understands that the refill order will ship to Dr. Slime Sorenson at [address] [facility]. Chas Petersen RN

## 2019-10-23 ENCOUNTER — DOCUMENTATION ONLY (OUTPATIENT)
Dept: FAMILY MEDICINE CLINIC | Age: 64
End: 2019-10-23

## 2019-10-23 NOTE — PROGRESS NOTES
[facility] Patient Assistant Program Reorder Request Forms completed and faxed by 10/23/2019 to   [phone].     Confirmation: OK

## 2019-10-25 ENCOUNTER — DOCUMENTATION ONLY (OUTPATIENT)
Dept: FAMILY MEDICINE CLINIC | Age: 64
End: 2019-10-25

## 2019-10-30 ENCOUNTER — OFFICE VISIT (OUTPATIENT)
Dept: FAMILY MEDICINE CLINIC | Age: 64
End: 2019-10-30

## 2019-10-30 VITALS
RESPIRATION RATE: 16 BRPM | DIASTOLIC BLOOD PRESSURE: 70 MMHG | BODY MASS INDEX: 41.62 KG/M2 | SYSTOLIC BLOOD PRESSURE: 122 MMHG | WEIGHT: 314 LBS | OXYGEN SATURATION: 95 % | HEART RATE: 84 BPM | TEMPERATURE: 97.7 F | HEIGHT: 73 IN

## 2019-10-30 DIAGNOSIS — E11.9 TYPE 2 DIABETES MELLITUS WITHOUT COMPLICATION, WITHOUT LONG-TERM CURRENT USE OF INSULIN (HCC): ICD-10-CM

## 2019-10-30 DIAGNOSIS — I10 ESSENTIAL HYPERTENSION: ICD-10-CM

## 2019-10-30 RX ORDER — LISINOPRIL AND HYDROCHLOROTHIAZIDE 20; 25 MG/1; MG/1
1 TABLET ORAL DAILY
Qty: 90 TAB | Refills: 1 | Status: SHIPPED | OUTPATIENT
Start: 2019-10-30 | End: 2020-05-18 | Stop reason: SDUPTHER

## 2019-10-30 RX ORDER — METFORMIN HYDROCHLORIDE 1000 MG/1
1000 TABLET ORAL 2 TIMES DAILY WITH MEALS
Qty: 180 TAB | Refills: 1 | Status: SHIPPED | OUTPATIENT
Start: 2019-10-30 | End: 2020-05-18

## 2019-10-30 RX ORDER — METOPROLOL TARTRATE 50 MG/1
50 TABLET ORAL 2 TIMES DAILY
Qty: 180 TAB | Refills: 1 | Status: SHIPPED | OUTPATIENT
Start: 2019-10-30 | End: 2020-05-18 | Stop reason: SDUPTHER

## 2019-10-30 NOTE — PATIENT INSTRUCTIONS

## 2019-10-30 NOTE — PROGRESS NOTES
Identified pt with two pt identifiers(name and ). Reviewed record in preparation for visit and have obtained necessary documentation. Chief Complaint   Patient presents with    Diabetes     chronic condition f/u        Health Maintenance Due   Topic    Pneumococcal 0-64 years (1 of 1 - PPSV23)    Shingrix Vaccine Age 50> (1 of 2)    DTaP/Tdap/Td series (1 - Tdap)    MICROALBUMIN Q1     FOOT EXAM Q1     FOBT Q 1 YEAR AGE 50-75     HEMOGLOBIN A1C Q6M        Coordination of Care Questionnaire:  :   1) Have you been to an emergency room, urgent care, or hospitalized since your last visit? If yes, where when, and reason for visit? no      2. Have seen or consulted any other health care provider since your last visit? If yes, where when, and reason for visit?  no        Patient is accompanied by self I have received verbal consent from Sourav Burgess to discuss any/all medical information while they are present in the room.

## 2019-10-30 NOTE — PROGRESS NOTES
Rudi Waters  60 y.o. male  1955  MLN:[id number]    [facility] MEDICINE  Progress Note     Encounter Date: 10/30/2019    Assessment and Plan:     Encounter Diagnoses     ICD-10-CM ICD-9-CM   1. Essential hypertension I10 401.9   2. Type 2 diabetes mellitus without complication, without long-term current use of insulin (HCC) E11.9 250.00       1. Essential hypertension  Well controlled. Continue medication.   - METABOLIC PANEL, BASIC  - lisinopril-hydroCHLOROthiazide (PRINZIDE, ZESTORETIC) 20-25 mg per tablet; Take 1 Tab by mouth daily. Dispense: 90 Tab; Refill: 1  - metoprolol tartrate (LOPRESSOR) 50 mg tablet; Take 1 Tab by mouth two (2) times a day. Dispense: 180 Tab; Refill: 1    2. Type 2 diabetes mellitus without complication, without long-term current use of insulin ([address]  Patient now on max dose of victoza. He reports that medication should be delivered to the practice as he gets medication thru an assistance program. He has been off insulin for several months but notes that fasting BS has been tranding upwards. Recheck labs now. If HbA1c worsening, will add another oral medication rather then resume insulin. Patient given sample for sitagliptan 100 mg  - HEMOGLOBIN A1C WITH EAG  - LIPID PANEL  - metFORMIN (GLUCOPHAGE) 1,000 mg tablet; Take 1 Tab by mouth two (2) times daily (with meals). Dispense: 180 Tab; Refill: 1      I have discussed the diagnosis with the patient and the intended plan as seen in the above orders. he has expressed understanding. The patient has received an after-visit summary and questions were answered concerning future plans. I have discussed medication side effects and warnings with the patient as well. Electronically Signed: Ronnie Bradley MD    Current Medications after this visit     Current Outpatient Medications   Medication Sig    lisinopril-hydroCHLOROthiazide (PRINZIDE, ZESTORETIC) 20-25 mg per tablet Take 1 Tab by mouth daily.     metoprolol tartrate (LOPRESSOR) 50 mg tablet Take 1 Tab by mouth two (2) times a day.  metFORMIN (GLUCOPHAGE) 1,000 mg tablet Take 1 Tab by mouth two (2) times daily (with meals).  liraglutide (VICTOZA) 0.6 mg/0.1 mL (18 mg/3 mL) pnij 1.2 mg by SubCUTAneous route daily. Increase each week by 1 click on pen as symptoms tolerate. Indications: type 2 diabetes mellitus (Patient taking differently: 1.8 mg by SubCUTAneous route daily. Indications: type 2 diabetes mellitus)    glucose blood VI test strips (BLOOD GLUCOSE TEST) strip Test once a day for DM-2 treated with insulin.  pravastatin (PRAVACHOL) 40 mg tablet Take 1 Tab by mouth nightly.  glipiZIDE (GLUCOTROL) 10 mg tablet Take 1 Tab by mouth two (2) times a day.  verapamil ER (CALAN-SR) 180 mg CR tablet Take 1 Tab by mouth nightly.  cholecalciferol (VITAMIN D3) 1,000 unit tablet Take 1 Tab by mouth daily. Indications: Vitamin D Deficiency    nicotinic acid (NIACIN) 500 mg tablet Take 500 mg by mouth Daily (before breakfast).  Insulin Needles, Disposable, 31 X 5/16 \" ndle Inject hs for DM-2 250.00    Lancets misc Test TID for DM-2 treated with insulin (250.00)    omega-3 fatty acids-vitamin e (FISH OIL) 1,000 mg cap Take 2 Caps by mouth two (2) times a day.  aspirin (ASPIRIN) 325 mg tablet Take 1 Tab by mouth daily. No current facility-administered medications for this visit.       Medications Discontinued During This Encounter   Medication Reason    lisinopril-hydroCHLOROthiazide (PRINZIDE, ZESTORETIC) 20-25 mg per tablet Reorder    metoprolol tartrate (LOPRESSOR) 50 mg tablet Reorder    metFORMIN (GLUCOPHAGE) 1,000 mg tablet Reorder     ~~~~~~~~~~~~~~~~~~~~~~~~~~~~~~~~~~~~~~~~~~~~~~    Chief Complaint   Patient presents with    Diabetes     chronic condition f/u       History provided by patient  History of Present Illness   Sowmya Salazar is a 60 y.o. male who presents to clinic today for:    Diabetes Follow up: Controlled   Overall the patient feels his dietary compliance is Good   Diabetic Consultants:Endocrinologist - N/A   Last HbA1c:   Lab Results   Component Value Date/Time    Hemoglobin A1c 9.9 (H) 01/08/2019 12:24 PM    Hemoglobin A1c (POC) 7.4 04/22/2019 08:37 AM      Therapy:metformin; glipizide and victoza; he has stopped taking the insulin and reports that he feels very good. Medication compliance:Good   Frequency of home glucose testing: Daily   Blood Sugar range at home:    Polyuria, polyphagia or polydipsia: NO   Low blood sugar symptoms: No    Hypertension: Controlled   BP Readings from Last 3 Encounters:   10/30/19 122/70   07/16/19 126/69   04/22/19 148/85     The patient reports:  taking medications as instructed, no medication side effects noted, no TIA's, no chest pain on exertion, no dyspnea on exertion, no swelling of ankles. Home monitoring:No        Health Maintenance  Completed by outside provider. Release for records signed.,  recommendation discussed and ordered with patient's permission. Health Maintenance Due   Topic Date Due    Pneumococcal 0-64 years (1 of 1 - PPSV23) 06[birth year]/1963    Shingrix Vaccine Age 50> (1 of 2) 06/07/2007    DTaP/Tdap/Td series (1 - Tdap) 01/22/2011    MICROALBUMIN Q1  01/25/2019    FOOT EXAM Q1  05/29/2019    FOBT Q 1 YEAR AGE 50-75  06/25/2019    HEMOGLOBIN A1C Q6M  10/22/2019     Review of Systems   Review of Systems   Constitutional:        See HPI for pertinent review of systems      Vitals/Objective:     Vitals:    10/30/19 1009   BP: 122/70   Pulse: 84   Resp: 16   Temp: 97.7 °F (36.5 °C)   TempSrc: Oral   SpO2: 95%   Weight: 314 lb (142.4 kg)   Height: 6' 1\" (1.854 m)     Body mass index is 41.43 kg/m². Wt Readings from Last 3 Encounters:   10/30/19 314 lb (142.4 kg)   07/16/19 317 lb (143.8 kg)   04/22/19 322 lb (146.1 kg)       Physical Exam   Constitutional: He is oriented to person, place, and time.  He appears well-developed and well-nourished. No distress. HENT:   Head: Normocephalic and atraumatic. Right Ear: External ear normal.   Left Ear: External ear normal.   Mouth/Throat: Oropharynx is clear and moist. No oropharyngeal exudate. Cardiovascular: Normal rate, S1 normal and S2 normal.   No murmur heard. Pulmonary/Chest: Effort normal and breath sounds normal. He has no rales. Musculoskeletal: He exhibits no edema. Neurological: He is alert and oriented to person, place, and time. Skin: Skin is warm and dry. Diabetic foot exam:     Left Foot:   Visual Exam: normal    Pulse DP: 2+ (normal)   Filament test: normal sensation    Vibratory sensation: normal      Right Foot:   Visual Exam: normal    Pulse DP: 2+ (normal)   Filament test: normal sensation    Vibratory sensation: normal      No results found for this or any previous visit (from the past 24 hour(s)). Disposition     Follow-up and Dispositions  ·   Return in about 6 months (around 2020) for Routine (Chronic Conditions). No future appointments. History   Patient's past medical, surgical and family histories were reviewed and updated.     Past Medical History:   Diagnosis Date    Hypercholesterolemia     Hypertension      Past Surgical History:   Procedure Laterality Date    HX ORTHOPAEDIC  2004    rt shoulder surgery    HX ORTHOPAEDIC      fx lft arm     Family History   Problem Relation Age of Onset    Diabetes Father     Hypertension Father      Social History     Tobacco Use    Smoking status: Former Smoker     Last attempt to quit: 1992     Years since quittin.4    Smokeless tobacco: Never Used   Substance Use Topics    Alcohol use: No    Drug use: No       Allergies     Allergies   Allergen Reactions    Cyclobenzaprine Other (comments)     Leg cramps    Percocet [Oxycodone-Acetaminophen] Nausea Only

## 2019-11-04 ENCOUNTER — TELEPHONE (OUTPATIENT)
Dept: FAMILY MEDICINE CLINIC | Age: 64
End: 2019-11-04

## 2019-11-04 NOTE — TELEPHONE ENCOUNTER
Telephone call made to [address] [place] [address] this morning to check on the status of the patient's refill order for Victoza 1.8mg. Per NN, they overlooked a piece of information on the refill request form and this delayed the processing of the order. The correction has been made in their system and they will expedite the shipping of the patient's Victoza to Dr. Sancho Duncan office ([address]). It should arrive this week. Telephone call made to the patient to let him know. He stated that he has enough Victoza for about one more week.  Rebekah Lynn RN

## 2019-11-06 ENCOUNTER — TELEPHONE (OUTPATIENT)
Dept: FAMILY MEDICINE CLINIC | Age: 64
End: 2019-11-06

## 2019-11-23 LAB
BUN SERPL-MCNC: 20 MG/DL (ref 8–27)
BUN/CREAT SERPL: 18 (ref 10–24)
CALCIUM SERPL-MCNC: 9.5 MG/DL (ref 8.6–10.2)
CHLORIDE SERPL-SCNC: 103 MMOL/L (ref 96–106)
CHOLEST SERPL-MCNC: 144 MG/DL (ref 100–199)
CO2 SERPL-SCNC: 24 MMOL/L (ref 20–29)
CREAT SERPL-MCNC: 1.14 MG/DL (ref 0.76–1.27)
EST. AVERAGE GLUCOSE BLD GHB EST-MCNC: 160 MG/DL
GLUCOSE SERPL-MCNC: 158 MG/DL (ref 65–99)
HBA1C MFR BLD: 7.2 % (ref 4.8–5.6)
HDLC SERPL-MCNC: 42 MG/DL
LDLC SERPL CALC-MCNC: 85 MG/DL (ref 0–99)
POTASSIUM SERPL-SCNC: 5 MMOL/L (ref 3.5–5.2)
SODIUM SERPL-SCNC: 142 MMOL/L (ref 134–144)
TRIGL SERPL-MCNC: 83 MG/DL (ref 0–149)
VLDLC SERPL CALC-MCNC: 17 MG/DL (ref 5–40)

## 2019-12-02 RX ORDER — GLIPIZIDE 10 MG/1
TABLET ORAL
Qty: 180 TAB | Refills: 1 | Status: SHIPPED | OUTPATIENT
Start: 2019-12-02 | End: 2020-05-18 | Stop reason: SDUPTHER

## 2020-01-27 DIAGNOSIS — E78.5 HYPERLIPIDEMIA, UNSPECIFIED HYPERLIPIDEMIA TYPE: ICD-10-CM

## 2020-01-27 RX ORDER — PRAVASTATIN SODIUM 40 MG/1
TABLET ORAL
Qty: 90 TAB | Refills: 0 | Status: SHIPPED | OUTPATIENT
Start: 2020-01-27 | End: 2020-05-18 | Stop reason: SDUPTHER

## 2020-01-31 DIAGNOSIS — I10 ESSENTIAL HYPERTENSION: ICD-10-CM

## 2020-01-31 RX ORDER — VERAPAMIL HYDROCHLORIDE 180 MG/1
180 TABLET, EXTENDED RELEASE ORAL
Qty: 90 TAB | Refills: 1 | Status: SHIPPED | OUTPATIENT
Start: 2020-01-31 | End: 2020-05-18 | Stop reason: SDUPTHER

## 2020-01-31 NOTE — TELEPHONE ENCOUNTER
----- Message from Chester Heath sent at 1/31/2020  9:26 AM EST -----  Regarding: Dr. Mehreen Lowery Message/Vendor Calls    Caller's first and last name: Lizbeth Kelley- wife      Reason for call: Schedule 6 mo follow up with different provider.        Callback required yes/no and why:      Best contact number(s): [phone]      Details to clarify the request:      Chester Heath

## 2020-01-31 NOTE — TELEPHONE ENCOUNTER
----- Message from Dave Adams sent at 1/31/2020  9:22 AM EST -----  Regarding: Dr. Didi Cabrera Message/Vendor Calls    Caller's first and last name: Francisco Javier Feliciano- wife      Reason for call: check status of Rx \"Verapamil\" sent to [address] on file.        Callback required yes/no and why: yes      Best contact number(s): [phone]      Details to clarify the request:      Dave Adams

## 2020-02-03 ENCOUNTER — DOCUMENTATION ONLY (OUTPATIENT)
Dept: FAMILY MEDICINE CLINIC | Age: 65
End: 2020-02-03

## 2020-02-03 NOTE — PROGRESS NOTES
[facility] Patient Assistance Program Forms completed and faxed by 02/03/20 to [phone].     Confirmation: OK

## 2020-02-12 NOTE — TELEPHONE ENCOUNTER
Stephy from [employer] (patient assistant program)    States that pt need to have full thanh with pt most recent household income in oder to refill.     Rx was sent in for [facility] and they will need form filled to precede with the refill     Any questions please call  [phone]

## 2020-02-13 ENCOUNTER — TELEPHONE (OUTPATIENT)
Dept: FAMILY MEDICINE CLINIC | Age: 65
End: 2020-02-13

## 2020-02-13 NOTE — TELEPHONE ENCOUNTER
T/C made to the patient to let him know that I spoke with [employer] today about his refill request for Victoza. There were no more refills available on his application at the time of our faxed request on 2020. (This is not the information I had on file for the patient.)    His Victoza application  on 2493 and can be renewed by sending in a new application and income information. T/C made to the patient to let him know about the denied refill request and to confirm that he received the PAP application that I mailed to him on 2020. Patient stated that he received the application and will return it to me as soon as possible.  Samantha Hess RN

## 2020-02-28 ENCOUNTER — DOCUMENTATION ONLY (OUTPATIENT)
Dept: FAMILY MEDICINE CLINIC | Age: 65
End: 2020-02-28

## 2020-02-28 NOTE — PROGRESS NOTES
[facility] [employer] Patient Assistance Program Application Forms completed and faxed by 02/27/20 to   [phone].     Confirmation: OK

## 2020-02-28 NOTE — PROGRESS NOTES
Patient's renewal application for Victoza 1.8mg daily and Novofine 32g needles has been signed by Dr. Carmina Montiel and faxed today to [facility] [employer] Patient Assistance Program. Fax confirmation received.  Sandra Erwin RN

## 2020-03-13 ENCOUNTER — TELEPHONE (OUTPATIENT)
Dept: FAMILY MEDICINE CLINIC | Age: 65
End: 2020-03-13

## 2020-03-13 NOTE — TELEPHONE ENCOUNTER
Pharmacy called needing clarification on the quantity on the Victoza pens 5 per given ONLY comes in packs of 3. Gave verbal to give 6. Per Dr Casey Lundberg.

## 2020-03-13 NOTE — TELEPHONE ENCOUNTER
T/C made to the patient to let him know that I just spoke with [employer] and his PAP application for Victoza 1.8mg daily and NovoFine 32g needles has been approved for 12 months. Because of several delays with the processing of his application, [employer] will expedite (but not overnight ship) the delivery of the patient's order. Normally, it would take 7-10 business to arrive, but they will try to have it delivered to the practice office by 03-. Sheryl Vasquez RN

## 2020-03-24 ENCOUNTER — DOCUMENTATION ONLY (OUTPATIENT)
Dept: FAMILY MEDICINE CLINIC | Age: 65
End: 2020-03-24

## 2020-03-24 NOTE — PROGRESS NOTES
Telephone call made to [facility] [employer] to check on the status of the patient's PAP order of Victoza. They stated that the shipment was delivered to the provider's office at [place] [address] on 03-. Telephone call made to the patient to ask if he has picked-up his Victoza. The patient stated that he did not know that it had been delivered to the practice. We agreed that I will call his provider's office in the morning and check on the status of his Victoza delivery.  Samantha Hess RN

## 2020-03-25 ENCOUNTER — TELEPHONE (OUTPATIENT)
Dept: FAMILY MEDICINE CLINIC | Age: 65
End: 2020-03-25

## 2020-03-25 NOTE — TELEPHONE ENCOUNTER
Samples for Victoza is ready for patient pick-up ( in the small black refrigerator)   Serial number [id number]

## 2020-03-25 NOTE — TELEPHONE ENCOUNTER
T/C made to Dr. Lizbeth Krause office, [address] [facility], to check on the status of the patient's PAP Victoza shipment. I spoke with Adryan Cee who confirmed that they did receive the patient's order of Victoza and needles. She will either fax me a copy of the shipping insert or document the product quantity and lot number in CC. T/C made to the patient to let him know that he can go to the office anytime during normal operating hours to  his PAP medication. He understands that per the provider's office, he needs to enter through the back door and will have his temperature checked before being allowed into the office. (Covid-19 precautions).  Bjorn Gracia RN

## 2020-04-07 ENCOUNTER — DOCUMENTATION ONLY (OUTPATIENT)
Dept: FAMILY MEDICINE CLINIC | Age: 65
End: 2020-04-07

## 2020-05-18 ENCOUNTER — VIRTUAL VISIT (OUTPATIENT)
Dept: FAMILY MEDICINE CLINIC | Age: 65
End: 2020-05-18

## 2020-05-18 DIAGNOSIS — E11.9 TYPE 2 DIABETES MELLITUS WITHOUT COMPLICATION, WITHOUT LONG-TERM CURRENT USE OF INSULIN (HCC): ICD-10-CM

## 2020-05-18 DIAGNOSIS — I10 ESSENTIAL HYPERTENSION: ICD-10-CM

## 2020-05-18 DIAGNOSIS — E78.5 HYPERLIPIDEMIA, UNSPECIFIED HYPERLIPIDEMIA TYPE: ICD-10-CM

## 2020-05-18 RX ORDER — METFORMIN HYDROCHLORIDE 500 MG/1
500 TABLET ORAL 2 TIMES DAILY WITH MEALS
Qty: 180 TAB | Refills: 1 | Status: SHIPPED | OUTPATIENT
Start: 2020-05-18 | End: 2021-03-05 | Stop reason: DRUGHIGH

## 2020-05-18 RX ORDER — LISINOPRIL AND HYDROCHLOROTHIAZIDE 20; 25 MG/1; MG/1
1 TABLET ORAL DAILY
Qty: 90 TAB | Refills: 1 | Status: SHIPPED | OUTPATIENT
Start: 2020-05-18 | End: 2021-03-29

## 2020-05-18 RX ORDER — VERAPAMIL HYDROCHLORIDE 180 MG/1
180 TABLET, EXTENDED RELEASE ORAL
Qty: 90 TAB | Refills: 1 | Status: SHIPPED | OUTPATIENT
Start: 2020-05-18 | End: 2021-04-07 | Stop reason: SDUPTHER

## 2020-05-18 RX ORDER — GLIPIZIDE 10 MG/1
10 TABLET ORAL 2 TIMES DAILY
Qty: 180 TAB | Refills: 1 | Status: SHIPPED | OUTPATIENT
Start: 2020-05-18 | End: 2021-04-07 | Stop reason: SDUPTHER

## 2020-05-18 RX ORDER — PRAVASTATIN SODIUM 40 MG/1
40 TABLET ORAL
Qty: 90 TAB | Refills: 1 | Status: SHIPPED | OUTPATIENT
Start: 2020-05-18 | End: 2020-12-09

## 2020-05-18 RX ORDER — METOPROLOL TARTRATE 50 MG/1
50 TABLET ORAL 2 TIMES DAILY
Qty: 180 TAB | Refills: 1 | Status: SHIPPED | OUTPATIENT
Start: 2020-05-18 | End: 2021-03-29

## 2020-05-18 NOTE — PROGRESS NOTES
Maia Jiménez  60 y.o. male  1955  [facility]:[id number]    [place] [facility] [place]     Encounter Date: 5/18/2020    Maia Jiménez is a 60 y.o. male who was seen by synchronous (real-time) audio-video technology on 5/18/2020. He and/or him healthcare decision maker is aware that this patient-initiated Telehealth encounter is a billable service, with coverage as determined by her insurance carrier. He  is aware that he may receive a bill and has provided verbal consent to proceed: Yes    I was at home while conducting this encounter. The patient was checked in/\"roomed\" by Imani Georges CMA via telephone in the office. The patient was at home during the encounter. This visit was completed virtually using [email or web address] me  Assessment and Plan:     Encounter Diagnoses     ICD-10-CM ICD-9-CM   1. Type 2 diabetes mellitus without complication, without long-term current use of insulin (HCC) E11.9 250.00   2. Essential hypertension I10 401.9   3. Hyperlipidemia, unspecified hyperlipidemia type E78.5 272.4       1. Type 2 diabetes mellitus without complication, without long-term current use of insulin (HCC)  With blood sugar trending up, add back metformin at 500 mg BID> Patient agreed to closely monitor his blood sugar and will completed labs in next 2 weeks. - metFORMIN (GLUCOPHAGE) 500 mg tablet; Take 1 Tab by mouth two (2) times daily (with meals). Dispense: 180 Tab; Refill: 1  - glipiZIDE (GLUCOTROL) 10 mg tablet; Take 1 Tab by mouth two (2) times a day. Dispense: 180 Tab; Refill: 1  - liraglutide (VICTOZA) 0.6 mg/0.1 mL (18 mg/3 mL) pnij; 1.8 mg by SubCUTAneous route daily. Increase each week by 1 click on pen as symptoms tolerate. Indications: type 2 diabetes mellitus  Dispense: 6 Adjustable Dose Pre-filled Pen Syringe; Refill: 1  - HEMOGLOBIN A1C WITH EAG; Future    2. Essential hypertension  Controlled.  Continue current medication.   - lisinopril-hydroCHLOROthiazide (PRINZIDE, ZESTORETIC) 20-25 mg per tablet; Take 1 Tab by mouth daily. Dispense: 90 Tab; Refill: 1  - metoprolol tartrate (LOPRESSOR) 50 mg tablet; Take 1 Tab by mouth two (2) times a day. Dispense: 180 Tab; Refill: 1  - verapamil ER (CALAN-SR) 180 mg CR tablet; Take 1 Tab by mouth nightly. Dispense: 90 Tab; Refill: 1  - TSH 3RD GENERATION; Future  - METABOLIC PANEL, COMPREHENSIVE; Future    3. Hyperlipidemia, unspecified hyperlipidemia type  - pravastatin (PRAVACHOL) 40 mg tablet; Take 1 Tab by mouth nightly. Dispense: 90 Tab; Refill: 1  - LIPID PANEL; Future      We discussed the expected course, resolution and complications of the diagnosis(es) in detail. Medication risks, benefits, costs, interactions, and alternatives were discussed as indicated. I advised him to contact the office if his condition worsens, changes or fails to improve as anticipated. He expressed understanding with the diagnosis(es) and plan. CPT Codes 92881-34019 for Established Patients may apply to this Telehealth Visit  Time-based coding, delete if not needed: I spent at least 15 minutes with this established patient, and >50% of the time was spent counseling and/or coordinating care regarding \    Pursuant to the emergency declaration under the [employer] and the [facility], 113 waiver authority and the [employer] and [employer] General Act, this Virtual  Visit was conducted, with patient's consent, to reduce the patient's risk of exposure to COVID-19 and provide continuity of care for an established patient. Services were provided through a video synchronous discussion virtually to substitute for in-person clinic visit. Electronically Signed: Claudia Mondragon MD    Current Medications after this visit     Current Outpatient Medications   Medication Sig    metFORMIN (GLUCOPHAGE) 500 mg tablet Take 1 Tab by mouth two (2) times daily (with meals).     glipiZIDE (GLUCOTROL) 10 mg tablet Take 1 Tab by mouth two (2) times a day.  liraglutide (VICTOZA) 0.6 mg/0.1 mL (18 mg/3 mL) pnij 1.8 mg by SubCUTAneous route daily. Increase each week by 1 click on pen as symptoms tolerate. Indications: type 2 diabetes mellitus    lisinopril-hydroCHLOROthiazide (PRINZIDE, ZESTORETIC) 20-25 mg per tablet Take 1 Tab by mouth daily.  metoprolol tartrate (LOPRESSOR) 50 mg tablet Take 1 Tab by mouth two (2) times a day.  pravastatin (PRAVACHOL) 40 mg tablet Take 1 Tab by mouth nightly.  verapamil ER (CALAN-SR) 180 mg CR tablet Take 1 Tab by mouth nightly.  cholecalciferol (VITAMIN D3) 1,000 unit tablet Take 1 Tab by mouth daily. Indications: Vitamin D Deficiency    aspirin (ASPIRIN) 325 mg tablet Take 1 Tab by mouth daily.  glucose blood VI test strips (BLOOD GLUCOSE TEST) strip Test once a day for DM-2 treated with insulin.  nicotinic acid (NIACIN) 500 mg tablet Take 500 mg by mouth Daily (before breakfast).  Insulin Needles, Disposable, 31 X 5/16 \" ndle Inject hs for DM-2 250.00    Lancets misc Test TID for DM-2 treated with insulin (250.00)    omega-3 fatty acids-vitamin e (FISH OIL) 1,000 mg cap Take 2 Caps by mouth two (2) times a day. No current facility-administered medications for this visit.       Medications Discontinued During This Encounter   Medication Reason    metFORMIN (GLUCOPHAGE) 1,000 mg tablet     glipiZIDE (GLUCOTROL) 10 mg tablet Reorder    liraglutide (VICTOZA) 0.6 mg/0.1 mL (18 mg/3 mL) pnij Reorder    lisinopril-hydroCHLOROthiazide (PRINZIDE, ZESTORETIC) 20-25 mg per tablet Reorder    metoprolol tartrate (LOPRESSOR) 50 mg tablet Reorder    pravastatin (PRAVACHOL) 40 mg tablet Reorder    verapamil ER (CALAN-SR) 180 mg CR tablet Reorder     ~~~~~~~~~~~~~~~~~~~~~~~~~~~~~~~~~~~~~~~~~~~~~~    Chief Complaint   Patient presents with    Medication Refill     Pravastatin refill       History of Present Illness   Nickolas Merritt is a 60 y.o. male who presents for:    Diabetes Follow up: Worsening   Overall the patient feels his dietary compliance is Fair   Diabetic Consultants:Endocrinologist - N/A   Last HbA1c:   Lab Results   Component Value Date/Time    Hemoglobin A1c 7.2 (H) 11/22/2019 11:31 AM    Hemoglobin A1c (POC) 7.4 04/22/2019 08:37 AM      Therapy:victoza, glipizide. Medication compliance:Good   Frequency of home glucose testing: BID   Blood Sugar range at home: 170-255   Polyuria, polyphagia or polydipsia:+starting to notice polydysia   Low blood sugar symptoms: No    Hypertension: Controlled   BP Readings from Last 3 Encounters:   10/30/19 122/70   07/16/19 126/69   04/22/19 148/85     The patient reports:  taking medications as instructed, no medication side effects noted, no TIA's, no chest pain on exertion, no dyspnea on exertion, no swelling of ankles. Home monitoring:No      Hyperlipidemia:  Stable  Cardiovascular risks for him are: diabetic  hypertension  hyperlipidemia  obese  sedentary lifestyle. Currently he takes Pravachol (pravastatin)   Myalgias: No  Cholesterol, total   Date Value Ref Range Status   11/22/2019 144 100 - 199 mg/dL Final     HDL Cholesterol   Date Value Ref Range Status   11/22/2019 42 >39 mg/dL Final     LDL, calculated   Date Value Ref Range Status   11/22/2019 85 0 - 99 mg/dL Final     Triglyceride   Date Value Ref Range Status   11/22/2019 83 0 - 149 mg/dL Final     ALT (SGPT)   Date Value Ref Range Status   10/27/2017 35 0 - 44 IU/L Final     AST (SGOT)   Date Value Ref Range Status   10/27/2017 17 0 - 40 IU/L Final     Alk. phosphatase   Date Value Ref Range Status   10/27/2017 59 39 - 117 IU/L Final     Review of Systems   Review of Systems   Constitutional: Negative for chills, fever, malaise/fatigue and weight loss. HENT: Negative for congestion, ear discharge and sore throat. Eyes: Negative for double vision, photophobia and discharge.    Respiratory: Negative for cough, sputum production, shortness of breath and wheezing. Cardiovascular: Negative for chest pain, palpitations and leg swelling. Gastrointestinal: Negative for diarrhea, nausea and vomiting. Genitourinary: Negative for dysuria and urgency. Skin: Negative. Neurological: Negative for dizziness, tremors and headaches. Endo/Heme/Allergies: Positive for polydipsia. Vitals/Objective:     Due to this being a TeleHealth evaluation, many elements of the physical examination are unable to be assessed. Physical Exam  Constitutional:       General: He is not in acute distress. Appearance: Normal appearance. He is obese. He is not ill-appearing, toxic-appearing or diaphoretic. HENT:      Head: Normocephalic and atraumatic. Right Ear: External ear normal.      Left Ear: External ear normal.   Eyes:      General:         Right eye: No discharge. Left eye: No discharge. Conjunctiva/sclera: Conjunctivae normal.   Pulmonary:      Effort: Pulmonary effort is normal.   Skin:     Coloration: Skin is not pale. Findings: No erythema. Neurological:      General: No focal deficit present. Mental Status: He is alert. Cranial Nerves: No cranial nerve deficit (  No Facial Asymmetry (Cranial nerve 7 motor function) (limited exam due to video visit)  ). Comments: Able to follow commands   Psychiatric:         Mood and Affect: Mood normal.         Behavior: Behavior normal.         Thought Content: Thought content normal.         No results found for this or any previous visit (from the past 24 hour(s)). Disposition     Follow-up and Dispositions  ·   Return in about 6 months (around 11/18/2020) for Routine (Chronic Conditions). No future appointments. History   Patient's past medical, surgical and family histories were reviewed and updated.     Past Medical History:   Diagnosis Date    Hypercholesterolemia     Hypertension      Past Surgical History:   Procedure Laterality Date    HX ORTHOPAEDIC     rt shoulder surgery    HX ORTHOPAEDIC      fx lft arm     Family History   Problem Relation Age of Onset    Diabetes Father     Hypertension Father      Social History     Tobacco Use    Smoking status: Former Smoker     Last attempt to quit: 1992     Years since quittin.9    Smokeless tobacco: Never Used   Substance Use Topics    Alcohol use: No    Drug use: No       Allergies     Allergies   Allergen Reactions    Cyclobenzaprine Other (comments)     Leg cramps    Percocet [Oxycodone-Acetaminophen] Nausea Only

## 2020-05-18 NOTE — PROGRESS NOTES
Patient stated name &   Chief Complaint   Patient presents with    Medication Refill     Pravastatin refill        Health Maintenance Due   Topic    Pneumococcal 0-64 years (1 of 1 - PPSV23)    DTaP/Tdap/Td series (1 - Tdap)    Shingrix Vaccine Age 50> (1 of 2)    MICROALBUMIN Q1     FOBT Q1Y Age 54-65     Eye Exam Retinal or Dilated        Wt Readings from Last 3 Encounters:   10/30/19 314 lb (142.4 kg)   19 317 lb (143.8 kg)   19 322 lb (146.1 kg)     Temp Readings from Last 3 Encounters:   10/30/19 97.7 °F (36.5 °C) (Oral)   19 97.6 °F (36.4 °C) (Oral)   19 95.5 °F (35.3 °C) (Oral)     BP Readings from Last 3 Encounters:   10/30/19 122/70   19 126/69   19 148/85     Pulse Readings from Last 3 Encounters:   10/30/19 84   19 85   19 90         Learning Assessment:  :     Learning Assessment 2015   PRIMARY LEARNER Patient Patient   HIGHEST LEVEL OF EDUCATION - PRIMARY LEARNER  DID NOT GRADUATE HIGH SCHOOL DID NOT GRADUATE HIGH SCHOOL   BARRIERS PRIMARY LEARNER NONE NONE   CO-LEARNER CAREGIVER No No   PRIMARY LANGUAGE ENGLISH ENGLISH    NEED - No   LEARNER PREFERENCE PRIMARY LISTENING LISTENING   LEARNING SPECIAL TOPICS no no   ANSWERED BY pat self   RELATIONSHIP SELF SELF   ASSESSMENT COMMENT - no       Depression Screening:  :     3 most recent PHQ Screens 10/30/2019   Little interest or pleasure in doing things Not at all   Feeling down, depressed, irritable, or hopeless Not at all   Total Score PHQ 2 0       Fall Risk Assessment:  :     No flowsheet data found. Abuse Screening:  :     No flowsheet data found. Coordination of Care Questionnaire:  :     1) Have you been to an emergency room, urgent care clinic since your last visit? NoHospitalized since your last visit? No             2) Have you seen or consulted any other health care providers outside of [address] [place] [address] since your last visit?  No  (Include any pap smears or colon screenings in this section.)    Patient is accompanied by VV I have received verbal consent from Lien Ruiz to discuss any/all medical information while they are present in the room.

## 2020-07-17 ENCOUNTER — TELEPHONE (OUTPATIENT)
Dept: FAMILY MEDICINE CLINIC | Age: 65
End: 2020-07-17

## 2020-07-31 ENCOUNTER — DOCUMENTATION ONLY (OUTPATIENT)
Dept: FAMILY MEDICINE CLINIC | Age: 65
End: 2020-07-31

## 2020-07-31 NOTE — PROGRESS NOTES
Sample for Victoza  And Santos Fine 32G Tip  Was received today.   Sample with needles are in the small black fridge in the nurses station

## 2020-12-03 ENCOUNTER — DOCUMENTATION ONLY (OUTPATIENT)
Dept: FAMILY MEDICINE CLINIC | Age: 65
End: 2020-12-03

## 2020-12-03 NOTE — PROGRESS NOTES
Patient's refill request for Victoza 1.8mg daily, 4 month supply, and Santos Fine 32g needle tips, 2 boxes of 100 each, was faxed to [facility] today. Fax confirmation received. [facility], the order should ship to the practice office.  Melania Barksdale RN

## 2020-12-09 DIAGNOSIS — E11.9 TYPE 2 DIABETES MELLITUS WITHOUT COMPLICATION, WITHOUT LONG-TERM CURRENT USE OF INSULIN (HCC): ICD-10-CM

## 2020-12-09 NOTE — TELEPHONE ENCOUNTER
----- Message from Jose Enrique Jennings sent at 12/9/2020 11:24 AM EST -----  Regarding: Dr. Hieu Valencia (if not patient): self  Relationship of caller (if not patient): self  Best contact number(s): [phone]  Name of medication and dosage if known: Victosin 1.8mg  Is patient out of this medication (yes/no): No  Pharm[facility]  Pharmacy listed in chart? (yes/no): Yes  Pharmacy phone number: NA  Date of last visit: 5/18/20  Details to clarify the request: Enough for 10 days. Delivered to the practice and picked up by Mr. Montse Torres.

## 2020-12-11 NOTE — TELEPHONE ENCOUNTER
Per encounter on 12/03/2020, 4 month supply should have been sent to the practice for the patient to . What is the issue with the medication.

## 2020-12-17 ENCOUNTER — TELEPHONE (OUTPATIENT)
Dept: FAMILY MEDICINE CLINIC | Age: 65
End: 2020-12-17

## 2020-12-17 NOTE — TELEPHONE ENCOUNTER
Return call made to the patient this morning to follow-up on the status of his refill request for Victoza through [facility]. Per [facility], his order was shipped on 12- and should be delivered to the practice office today. The patient's enrollment in [facility] expires on 03-. I will mail him the renewal paperwork to complete and return to me.  Sarahi Roa RN

## 2020-12-18 ENCOUNTER — DOCUMENTATION ONLY (OUTPATIENT)
Dept: FAMILY MEDICINE CLINIC | Age: 65
End: 2020-12-18

## 2020-12-18 NOTE — PROGRESS NOTES
Patient  reports to office to  2 boxes of NovoFine 32G tip disposable Needles and 4 boxes of Victoza (liraglutide) injection. No action needed.

## 2021-01-04 ENCOUNTER — DOCUMENTATION ONLY (OUTPATIENT)
Dept: FAMILY MEDICINE CLINIC | Age: 66
End: 2021-01-04

## 2021-01-04 NOTE — PROGRESS NOTES
Patient's enrollment in [facility] for Victoza 0.6mg/.01ml, 18mg/3ml pen, expires on 03-. The enrollment renewal paperwork was mailed to the patient's home today. A postage paid, addressed envelope for him to return the forms to me was included in the mailing.  Winston Han RN

## 2021-03-01 ENCOUNTER — IMMUNIZATION (OUTPATIENT)
Dept: FAMILY MEDICINE CLINIC | Age: 66
End: 2021-03-01

## 2021-03-01 DIAGNOSIS — Z23 ENCOUNTER FOR IMMUNIZATION: Primary | ICD-10-CM

## 2021-03-01 PROCEDURE — 0001A COVID-19, MRNA, LNP-S, PF, 30MCG/0.3ML DOSE(PFIZER): CPT

## 2021-03-01 PROCEDURE — 91300 COVID-19, MRNA, LNP-S, PF, 30MCG/0.3ML DOSE(PFIZER): CPT

## 2021-03-04 ENCOUNTER — OFFICE VISIT (OUTPATIENT)
Dept: FAMILY MEDICINE CLINIC | Age: 66
End: 2021-03-04

## 2021-03-04 VITALS
HEIGHT: 73 IN | HEART RATE: 86 BPM | OXYGEN SATURATION: 100 % | TEMPERATURE: 97.3 F | DIASTOLIC BLOOD PRESSURE: 77 MMHG | WEIGHT: 314 LBS | RESPIRATION RATE: 20 BRPM | BODY MASS INDEX: 41.62 KG/M2 | SYSTOLIC BLOOD PRESSURE: 161 MMHG

## 2021-03-04 DIAGNOSIS — L30.9 ECZEMA, UNSPECIFIED TYPE: ICD-10-CM

## 2021-03-04 DIAGNOSIS — Z12.11 SCREENING FOR MALIGNANT NEOPLASM OF COLON: ICD-10-CM

## 2021-03-04 DIAGNOSIS — E78.2 MIXED HYPERLIPIDEMIA: ICD-10-CM

## 2021-03-04 DIAGNOSIS — I10 ESSENTIAL HYPERTENSION: ICD-10-CM

## 2021-03-04 PROCEDURE — 99214 OFFICE O/P EST MOD 30 MIN: CPT | Performed by: FAMILY MEDICINE

## 2021-03-04 RX ORDER — ZOSTER VACCINE RECOMBINANT, ADJUVANTED 50 MCG/0.5
0.5 KIT INTRAMUSCULAR ONCE
Qty: 0.5 ML | Refills: 0 | Status: SHIPPED | OUTPATIENT
Start: 2021-03-04 | End: 2021-03-04 | Stop reason: CLARIF

## 2021-03-04 RX ORDER — TRIAMCINOLONE ACETONIDE 0.25 MG/G
CREAM TOPICAL
Qty: 15 G | Refills: 2 | Status: SHIPPED | OUTPATIENT
Start: 2021-03-04 | End: 2022-03-11

## 2021-03-04 NOTE — PROGRESS NOTES
CC: Establish care, f/u DM, HTN and HLD    HPI: Pt is a 63 y.o. male who presents for establish care, f/u DM, HTN and HLD. His current PCP is moving so he would like to re-establish in our clinic. He also has a rash on his right wrist. It is red and itchy, has been coming and going for the past few months. He works as a  and notes that some of the chemicals he comes into contact with seem to make his hands dry. He washes them frequently. In the past he had some type of cream that helped but can't remember what it was. He has not tried anything for the symptoms this time. He did not take his BP medication last night or this morning, but reports that in general he takes his medications daily. HTN:  Checking BPs at home?: NO  Headaches?: NO  Blurry vision?: NO  Lower extremity edema?: NO  Smoking?: NO    DM:  Checking BG at home?: NO - last time was months ago and it was in the 200's  Insulin dependent?: NO  Other medications for DM?: Metformin 500mg BID, glipizide 10mg BID, Victoza 1.8mg daily  Symptoms of hypoglycemia?: NO  Aspirin?: NO  ACEi/ARB?: YES  Statin?: YES  Last eye exam?: 2018  Last foot exam?: 2019  Last A1c:   Lab Results   Component Value Date/Time    Hemoglobin A1c 7.2 (H) 2019 11:31 AM    Hemoglobin A1c (POC) 7.4 2019 08:37 AM     LastLDL:   Lab Results   Component Value Date/Time    LDL, calculated 85 2019 11:31 AM     Last microalbumin:   Lab Results   Component Value Date/Time    Microalb/Creat ratio (ug/mg creat.) 87.5 (H) 2018 11:28 AM           Past Medical History:   Diagnosis Date    Hypercholesterolemia     Hypertension        Family History   Problem Relation Age of Onset    Diabetes Father     Hypertension Father        Social History     Tobacco Use    Smoking status: Former Smoker     Quit date: 1992     Years since quittin.7    Smokeless tobacco: Never Used   Substance Use Topics    Alcohol use: No    Drug use:  No ROS:  Per HPI    PE:  Visit Vitals  BP (!) 161/77   Pulse 86   Temp 97.3 °F (36.3 °C) (Temporal)   Resp 20   Ht 6' 1\" (1.854 m)   Wt 314 lb (142.4 kg)   SpO2 100%   BMI 41.43 kg/m²     Gen: Pt sitting in chair, in NAD  Head: Normocephalic, atraumatic  Eyes: Sclera anicteric, EOM grossly intact, PERRL  Throat: Mask in place  Neck: Supple, no LAD, no thyromegaly or carotid bruits  CVS: Normal S1, S2, no m/r/g  Resp: CTAB, no wheezes or rales  Extrem: Atraumatic, no cyanosis or edema  Feet: Skin intact, no lesions. DP pulses 2+ b/l. Sensation decreased to light touch and monofilament throughout. Pulses: 2+   Skin: Warm, dry  Neuro: Alert, oriented, appropriate      A/P:   Encounter Diagnoses     ICD-10-CM ICD-9-CM   1. Uncontrolled type 2 diabetes mellitus with diabetic nephropathy, with long-term current use of insulin ([facility])  E11.21 250.42    E11.65 583.81    Z79.4 V58.67   2. Essential hypertension  I10 401.9   3. Mixed hyperlipidemia  E78.2 272.2   4. Screening for malignant neoplasm of colon  Z12.11 V76.51   5. Eczema, unspecified type  L30.9 692.9     1. Uncontrolled type 2 diabetes mellitus with diabetic nephropathy, with long-term current use of insulin ([facility]): Previous A1c had not looked bad, but based on fill history of medications and BG >200 when he last checked, I am concerned that this may look worse this time. Talked about importance of taking medications as prescribed. Will check B12 2/2 chronic metformin use. - HEMOGLOBIN A1C WITH EAG; Future  - LIPID PANEL; Future  - MICROALBUMIN, UR, RAND W/ MICROALB/CREAT RATIO; Future  - VITAMIN B12; Future  - HM DIABETES FOOT EXAM    2. Essential hypertension: Uncontrolled today but pt has not had BP medication. Discussed importance of taking medication as prescribed. Chart review shows that in the past he was controlled on his current regimen. Advised him to start checking BP at home and call if >691/94.  - METABOLIC PANEL, COMPREHENSIVE; Future    3. Mixed hyperlipidemia  - LIPID PANEL; Future    4. Eczema: Likely cause of skin rash. Will do trial of kenalog until rash resolves and advised him on daily skin care routine, making sure to dry hands well after washing, avoid direct exposure to chemicals that cause irritation, etc.       RTC in 3-6 months for f/u chronic conditions, or sooner prn, pending results of labs    Discussed diagnoses in detail with patient. Medication risks/benefits/side effects discussed with patient. All of the patient's questions were addressed. The patient understands and agrees with our plan of care. The patient knows to call back if they are unsure of or forget any changes we discussed today or if the symptoms change. The patient received an After-Visit Summary which contains VS, orders, medication list and allergy list. This can be used as a \"mini-medical record\" should they have to seek medical care while out of town. Current Outpatient Medications on File Prior to Visit   Medication Sig Dispense Refill    pravastatin (PRAVACHOL) 40 mg tablet Take 1 tablet by mouth nightly 30 Tab 0    metFORMIN (GLUCOPHAGE) 500 mg tablet Take 1 Tab by mouth two (2) times daily (with meals). 180 Tab 1    glipiZIDE (GLUCOTROL) 10 mg tablet Take 1 Tab by mouth two (2) times a day. 180 Tab 1    liraglutide (VICTOZA) 0.6 mg/0.1 mL (18 mg/3 mL) pnij 1.8 mg by SubCUTAneous route daily. Increase each week by 1 click on pen as symptoms tolerate. Indications: type 2 diabetes mellitus 6 Adjustable Dose Pre-filled Pen Syringe 1    lisinopril-hydroCHLOROthiazide (PRINZIDE, ZESTORETIC) 20-25 mg per tablet Take 1 Tab by mouth daily. 90 Tab 1    metoprolol tartrate (LOPRESSOR) 50 mg tablet Take 1 Tab by mouth two (2) times a day. 180 Tab 1    verapamil ER (CALAN-SR) 180 mg CR tablet Take 1 Tab by mouth nightly. 90 Tab 1    glucose blood VI test strips (BLOOD GLUCOSE TEST) strip Test once a day for DM-2 treated with insulin.  100 Strip 1    nicotinic acid (NIACIN) 500 mg tablet Take 500 mg by mouth Daily (before breakfast).  Insulin Needles, Disposable, 31 X 5/16 \" ndle Inject hs for DM-2 250.00 1 Package 11    Lancets misc Test TID for DM-2 treated with insulin (250.00) 1 Package 11    aspirin (ASPIRIN) 325 mg tablet Take 1 Tab by mouth daily. 90 Tab 12     No current facility-administered medications on file prior to visit.

## 2021-03-04 NOTE — PATIENT INSTRUCTIONS
Your doctor has recommended that you have an eye exam done.  You can contact one of the below offices to schedule your own appointment.  Once you have the visit, please ask their office to send us a copy for your record here. 
 
[facility]                     [phone] 
Dr. Geetha Avendaño                          [phone] 
Dr. Kurt Harvey                           [phone] 
[facility]                               [phone] 
[facility]             [phone] 
 
[facility] 
Affiliated with [address]  [address] [phone] 
 
Monitor blood pressure outside the office several times weekly at different times during the day and evening. Bring the record to me in 3 weeks for review. 
 
Blood Pressure Record  
 
Patient Name:  ______________________ :  ______________________ 
 
Date/Time BP Reading Pulse

## 2021-03-04 NOTE — PROGRESS NOTES
1. Have you been to the ER, urgent care clinic since your last visit? Hospitalized since your last visit? No    2. Have you seen or consulted any other health care providers outside of the [address] [place] [address] since your last visit? Include any pap smears or colon screening.  No  Reviewed record in preparation for visit and have necessary documentation  Pt did not bring medication to office visit for review    Goals that were addressed and/or need to be completed during or after this appointment include   Health Maintenance Due   Topic Date Due    Pneumococcal 0-64 years (1 of 1 - PPSV23) Never done    DTaP/Tdap/Td series (1 - Tdap) 06/07/1978    Shingrix Vaccine Age 50> (1 of 2) Never done    MICROALBUMIN Q1  01/25/2019    Colorectal Cancer Screening Combo  06/25/2019    Eye Exam Retinal or Dilated  05/18/2020    Foot Exam Q1  10/30/2020    A1C test (Diabetic or Prediabetic)  11/22/2020    Lipid Screen  11/22/2020

## 2021-03-05 ENCOUNTER — TELEPHONE (OUTPATIENT)
Dept: FAMILY MEDICINE CLINIC | Age: 66
End: 2021-03-05

## 2021-03-05 DIAGNOSIS — I10 ESSENTIAL HYPERTENSION: Primary | ICD-10-CM

## 2021-03-05 LAB
ALBUMIN SERPL-MCNC: 3.8 G/DL (ref 3.5–5)
ALBUMIN/GLOB SERPL: 1.1 {RATIO} (ref 1.1–2.2)
ALP SERPL-CCNC: 72 U/L (ref 45–117)
ALT SERPL-CCNC: 89 U/L (ref 12–78)
ANION GAP SERPL CALC-SCNC: 6 MMOL/L (ref 5–15)
AST SERPL-CCNC: 44 U/L (ref 15–37)
BILIRUB SERPL-MCNC: 0.4 MG/DL (ref 0.2–1)
BUN SERPL-MCNC: 21 MG/DL (ref 6–20)
BUN/CREAT SERPL: 16 (ref 12–20)
CALCIUM SERPL-MCNC: 9.5 MG/DL (ref 8.5–10.1)
CHLORIDE SERPL-SCNC: 105 MMOL/L (ref 97–108)
CHOLEST SERPL-MCNC: 188 MG/DL
CO2 SERPL-SCNC: 24 MMOL/L (ref 21–32)
CREAT SERPL-MCNC: 1.31 MG/DL (ref 0.7–1.3)
CREAT UR-MCNC: 91.8 MG/DL
EST. AVERAGE GLUCOSE BLD GHB EST-MCNC: 229 MG/DL
GLOBULIN SER CALC-MCNC: 3.4 G/DL (ref 2–4)
GLUCOSE SERPL-MCNC: 216 MG/DL (ref 65–100)
HBA1C MFR BLD: 9.6 % (ref 4–5.6)
HDLC SERPL-MCNC: 42 MG/DL
HDLC SERPL: 4.5 {RATIO} (ref 0–5)
LDLC SERPL CALC-MCNC: 111.2 MG/DL (ref 0–100)
LIPID PROFILE,FLP: ABNORMAL
MICROALBUMIN UR-MCNC: 12.5 MG/DL
MICROALBUMIN/CREAT UR-RTO: 136 MG/G (ref 0–30)
POTASSIUM SERPL-SCNC: 4.3 MMOL/L (ref 3.5–5.1)
PROT SERPL-MCNC: 7.2 G/DL (ref 6.4–8.2)
SODIUM SERPL-SCNC: 135 MMOL/L (ref 136–145)
TRIGL SERPL-MCNC: 174 MG/DL (ref ?–150)
VIT B12 SERPL-MCNC: 363 PG/ML (ref 193–986)
VLDLC SERPL CALC-MCNC: 34.8 MG/DL

## 2021-03-05 RX ORDER — METFORMIN HYDROCHLORIDE 1000 MG/1
1000 TABLET ORAL 2 TIMES DAILY WITH MEALS
Qty: 180 TAB | Refills: 1 | Status: SHIPPED | OUTPATIENT
Start: 2021-03-05 | End: 2021-08-05 | Stop reason: SDUPTHER

## 2021-03-05 NOTE — TELEPHONE ENCOUNTER
Called to discuss recent results. 1. A1c significantly worse than last time. He reports missing a dose of his medications about once a week. Discussed importance of compliance in addition to diabetic diet. Will also increase metformin to 1000mg BID and plan to recheck in 3 months. 2. Cholesterol also worse than before. Discussed compliance as above. 3. Kidney function worse. Would like him to work on increasing hydration and will recheck in 1 month. 4. LFT's slightly elevated. These were last checked in 2017 and normal. Likely 2/2 fatty liver. Will work on diet, lifestyle and medication compliance, as above, and recheck in 6 months. If still elevated will pursue further work-up. Currently he is asymptomatic but discussed signs to look out for and for which he should call the clinic. 5. B12 normal.    6. Microalbumin also worse. Should improve with better BG control. All questions answered and pt feels comfortable with the plan of care.

## 2021-03-22 ENCOUNTER — IMMUNIZATION (OUTPATIENT)
Dept: FAMILY MEDICINE CLINIC | Age: 66
End: 2021-03-22

## 2021-03-22 DIAGNOSIS — Z23 ENCOUNTER FOR IMMUNIZATION: Primary | ICD-10-CM

## 2021-03-22 PROCEDURE — 91300 COVID-19, MRNA, LNP-S, PF, 30MCG/0.3ML DOSE(PFIZER): CPT | Performed by: FAMILY MEDICINE

## 2021-03-22 PROCEDURE — 0002A COVID-19, MRNA, LNP-S, PF, 30MCG/0.3ML DOSE(PFIZER): CPT | Performed by: FAMILY MEDICINE

## 2021-03-26 ENCOUNTER — TELEPHONE (OUTPATIENT)
Dept: FAMILY MEDICINE CLINIC | Age: 66
End: 2021-03-26

## 2021-03-26 NOTE — TELEPHONE ENCOUNTER
T/C made to the patient to follow-up on the PAP application (for Victoza) that I mailed to him in January. His enrollment  on 21. His last shipment of Victoza was delivered to his PCPs office in . There was no answer, so a message was left asking for a return call to me about his application for Victoza. While typing this note, I received a return call from the patient. He stated that his daughter will be faxing his PAP application to me today. He also stated that he is now a patient at [address] [place] [address]. I informed him that I can assist patients of [place] with PAP applications and will let him know when I receive his. The patient expressed understanding and has my direct office number, [phone].  Chance Smart RN

## 2021-03-31 DIAGNOSIS — I10 ESSENTIAL HYPERTENSION: ICD-10-CM

## 2021-03-31 DIAGNOSIS — E78.5 HYPERLIPIDEMIA, UNSPECIFIED HYPERLIPIDEMIA TYPE: ICD-10-CM

## 2021-03-31 RX ORDER — METOPROLOL TARTRATE 50 MG/1
TABLET ORAL
Qty: 180 TAB | Refills: 1 | Status: SHIPPED | OUTPATIENT
Start: 2021-03-31 | End: 2021-08-05 | Stop reason: SDUPTHER

## 2021-03-31 RX ORDER — LISINOPRIL AND HYDROCHLOROTHIAZIDE 20; 25 MG/1; MG/1
TABLET ORAL
Qty: 90 TAB | Refills: 1 | Status: SHIPPED | OUTPATIENT
Start: 2021-03-31 | End: 2021-08-05 | Stop reason: SDUPTHER

## 2021-03-31 RX ORDER — PRAVASTATIN SODIUM 40 MG/1
TABLET ORAL
Qty: 90 TAB | Refills: 1 | Status: SHIPPED | OUTPATIENT
Start: 2021-03-31 | End: 2021-08-05 | Stop reason: SDUPTHER

## 2021-04-07 ENCOUNTER — TELEPHONE (OUTPATIENT)
Dept: FAMILY MEDICINE CLINIC | Age: 66
End: 2021-04-07

## 2021-04-07 ENCOUNTER — DOCUMENTATION ONLY (OUTPATIENT)
Dept: FAMILY MEDICINE CLINIC | Age: 66
End: 2021-04-07

## 2021-04-07 DIAGNOSIS — I10 ESSENTIAL HYPERTENSION: ICD-10-CM

## 2021-04-07 DIAGNOSIS — E11.9 TYPE 2 DIABETES MELLITUS WITHOUT COMPLICATION, WITHOUT LONG-TERM CURRENT USE OF INSULIN (HCC): ICD-10-CM

## 2021-04-07 NOTE — PROGRESS NOTES
Patient's enrollment renewal for Victoza 1.8mg daily and 2 boxes of NovoFine Plus 32G needles was faxed to [employer] [facility] with a request to please process and ship the order to the Provider's office as quickly as possible since the patient is almost out of medication. Fax confirmation received.  Violeta Lorenzo RN

## 2021-04-07 NOTE — TELEPHONE ENCOUNTER
Returned call to Charisma, on [address] The medications requested looks like he should have ran out months ago. Has he been getting them prescribed from someone else? When is the last time he took these medications?

## 2021-04-13 RX ORDER — GLIPIZIDE 10 MG/1
10 TABLET ORAL 2 TIMES DAILY
Qty: 180 TAB | Refills: 1 | Status: SHIPPED | OUTPATIENT
Start: 2021-04-13 | End: 2022-04-26

## 2021-04-13 RX ORDER — VERAPAMIL HYDROCHLORIDE 180 MG/1
180 TABLET, EXTENDED RELEASE ORAL
Qty: 90 TAB | Refills: 1 | Status: SHIPPED | OUTPATIENT
Start: 2021-04-13 | End: 2021-08-05 | Stop reason: SDUPTHER

## 2021-04-13 NOTE — TELEPHONE ENCOUNTER
Pt wife states that there has been confusion on these medications they were sent to another Dr. No he has not been getting these medications from another Dr. It has been a couple weeks since he has taken these

## 2021-04-13 NOTE — TELEPHONE ENCOUNTER
Spoke with Dolores Ocasio, on [address] Dolores Ocasio made aware pt's medications were refilled but Dr. Christina Osborne had ordered labs back in March that he has not done yet and they needed to be done ASAP. She verbalized understanding.

## 2021-04-13 NOTE — TELEPHONE ENCOUNTER
Pt called inquiring about his BP medication(unsure of the name) I advised that 2 prescriptions were sent to pharmacy today. Pt says that he does not understand why he need to come in to have labs drawn again. I advised that the doctor would like for him to repeat renal function testing, pt says that he is not going to have this done until he start back taking his BP medication.

## 2021-04-20 ENCOUNTER — TELEPHONE (OUTPATIENT)
Dept: FAMILY MEDICINE CLINIC | Age: 66
End: 2021-04-20

## 2021-04-20 NOTE — TELEPHONE ENCOUNTER
T/C made to the patient to let him know that I spoke with [employer] earlier today about his enrollment renewal application for Victoza. They are processing his application and requested additional information about the ordering provider which was faxed today. They are aware that he is almost out of Victoza. The patient asked for an update as soon as possible about his application and shipment of his order of Victoza. I will call [facility] tomorrow and then call the patient.  Daisy Winkler RN

## 2021-04-23 ENCOUNTER — TELEPHONE (OUTPATIENT)
Dept: FAMILY MEDICINE CLINIC | Age: 66
End: 2021-04-23

## 2021-04-23 DIAGNOSIS — E11.9 TYPE 2 DIABETES MELLITUS WITHOUT COMPLICATION, WITHOUT LONG-TERM CURRENT USE OF INSULIN (HCC): ICD-10-CM

## 2021-04-23 NOTE — TELEPHONE ENCOUNTER
T/C made to the patient to let him know that I just spoke with [employer] and his enrollment renewal for Victoza and NovoFine needle tips was approved through 04-. Unfortunately, they are unable to expedite the shipping of his order as we requested. They expect it will arrive at the provider's office, [facility], in 10-14 business days from today. There is a chance it could be delivered sooner, but they could not promise that. T/C made to the patient to let him know. The patient stated that he will run out of Victoza on Sunday, 04-25-21. He understands that I will send a message to Dr. Aureliano Nevarez to let her know.  Jae Matos RN

## 2021-04-26 NOTE — TELEPHONE ENCOUNTER
Can we find out if he wants us to send a temporary supply to a local pharmacy? Otherwise he will just be out for a few weeks. Thanks!

## 2021-04-27 NOTE — TELEPHONE ENCOUNTER
Rx sent for 10 day supply with one refill. Hoping to minimize cost by prescribing smallest amount given that he normally gets this through patient assistance.

## 2021-04-28 NOTE — TELEPHONE ENCOUNTER
Spoke with Jojo Cruz, (on HIPAA)  She reports short supply too expensive and will wait for patient assistance

## 2021-04-28 NOTE — TELEPHONE ENCOUNTER
Marta Mcdonald [employer]   Phone Number:  [phone] (Call me)             General Message/Vendor Calls     Caller's first and last name: Polly Hwang, Wife. Reason for call: Rx was called in, is too expensiveand pt cannot get it. Was supposed to be taken until \"Victoza\" came in in two weeks. Callback required yes/no and why: Yes, needs different Rx called in. Best contact number(s): [phone]       Details to clarify the request: N/a.        Michaela Ball

## 2021-05-07 ENCOUNTER — TELEPHONE (OUTPATIENT)
Dept: FAMILY MEDICINE CLINIC | Age: 66
End: 2021-05-07

## 2021-05-07 NOTE — TELEPHONE ENCOUNTER
Patient left me a phone message earlier this morning asking about the status of his PAP order of Victoza. Per chart review, it seems that his order was delivered to the provider's office today and they called him to let him know. Return call made to the patient to make sure he was aware that his Victoza order is at his provider's office and ready for pick-up. There was no answer, so a message was left. The patient has my office number, [phone], if there are any problems with his order or when he is ready to order a refill.  Vicenta Pradhan RN

## 2021-05-07 NOTE — TELEPHONE ENCOUNTER
Pt states [facility] was sending the medication to us thru the mail about two weeks ago. He is completely out of it. He has been doubling up on his MEDFORMIN trying to keep his sugars down.

## 2021-05-10 ENCOUNTER — TELEPHONE (OUTPATIENT)
Dept: FAMILY MEDICINE CLINIC | Age: 66
End: 2021-05-10

## 2021-05-10 NOTE — TELEPHONE ENCOUNTER
Call made to pt to make him aware that we had the needle tips to go with his victoza. Pt stated he had a few so when he comes back to town, he will pick them up.

## 2021-08-05 ENCOUNTER — OFFICE VISIT (OUTPATIENT)
Dept: FAMILY MEDICINE CLINIC | Age: 66
End: 2021-08-05

## 2021-08-05 VITALS
RESPIRATION RATE: 22 BRPM | WEIGHT: 303 LBS | OXYGEN SATURATION: 94 % | SYSTOLIC BLOOD PRESSURE: 133 MMHG | BODY MASS INDEX: 40.16 KG/M2 | HEIGHT: 73 IN | TEMPERATURE: 97.6 F | DIASTOLIC BLOOD PRESSURE: 77 MMHG | HEART RATE: 79 BPM

## 2021-08-05 DIAGNOSIS — I10 ESSENTIAL HYPERTENSION: ICD-10-CM

## 2021-08-05 DIAGNOSIS — R25.2 LEG CRAMPS: ICD-10-CM

## 2021-08-05 DIAGNOSIS — E78.5 HYPERLIPIDEMIA, UNSPECIFIED HYPERLIPIDEMIA TYPE: ICD-10-CM

## 2021-08-05 DIAGNOSIS — R79.89 ELEVATED LFTS: ICD-10-CM

## 2021-08-05 PROCEDURE — 99214 OFFICE O/P EST MOD 30 MIN: CPT | Performed by: FAMILY MEDICINE

## 2021-08-05 RX ORDER — LISINOPRIL AND HYDROCHLOROTHIAZIDE 20; 25 MG/1; MG/1
TABLET ORAL
Qty: 90 TABLET | Refills: 1 | Status: SHIPPED | OUTPATIENT
Start: 2021-08-05 | End: 2022-04-26

## 2021-08-05 RX ORDER — PRAVASTATIN SODIUM 40 MG/1
TABLET ORAL
Qty: 90 TABLET | Refills: 1 | Status: SHIPPED | OUTPATIENT
Start: 2021-08-05 | End: 2022-06-14 | Stop reason: SDUPTHER

## 2021-08-05 RX ORDER — METFORMIN HYDROCHLORIDE 1000 MG/1
1000 TABLET ORAL 2 TIMES DAILY WITH MEALS
Qty: 180 TABLET | Refills: 1 | Status: SHIPPED | OUTPATIENT
Start: 2021-08-05 | End: 2021-09-24

## 2021-08-05 RX ORDER — METOPROLOL TARTRATE 50 MG/1
TABLET ORAL
Qty: 180 TABLET | Refills: 1 | Status: SHIPPED | OUTPATIENT
Start: 2021-08-05 | End: 2021-10-19

## 2021-08-05 RX ORDER — VERAPAMIL HYDROCHLORIDE 180 MG/1
180 TABLET, EXTENDED RELEASE ORAL
Qty: 90 TABLET | Refills: 1 | Status: SHIPPED | OUTPATIENT
Start: 2021-08-05 | End: 2022-05-31

## 2021-08-05 NOTE — PROGRESS NOTES
CC: f/u HTN, DM and HLD    HPI: Pt is a 61 y.o. male who presents for f/u HTN, DM and HLD. He has been having leg cramps in both legs only at night. Can be anywhere from his upper legs to the tops of his feet. He has noticed that Aleve tends to make the cramps worse. Tylenol helps. He has a h/o injuries to his lumbar spine when he was much younger and is recently starting to have pain in his lower back.      HTN:  Checking BPs at home?: YES  Logs today?: NO  Range of BPs?: 140's/90's - has a wrist cuff  Adding salt to foods?: NO  Headaches?: NO  Blurry vision?: NO  Lower extremity edema?: NO  Smoking?: NO    DM:  Checking BG at home?: YES, sometimes randomly  Logs today?: NO  BG range: 160-180's  Insulin dependent?: NO  Other medications for DM?: Glipizide 10mg BID, metformin 1000mg BID, Victoza 1.8mg daily  Symptoms of hypoglycemia?: YES, infrequently when he is late eating  Aspirin?: YES  ACEi/ARB?: YES  Statin?: YES  Last eye exam?: 2021  Last foot exam?: 3/2021  Last A1c:   Lab Results   Component Value Date/Time    Hemoglobin A1c 9.6 (H) 2021 09:05 AM    Hemoglobin A1c (POC) 7.4 2019 08:37 AM     LastLDL:   Lab Results   Component Value Date/Time    LDL, calculated 111.2 (H) 2021 09:05 AM     Last microalbumin:   Lab Results   Component Value Date/Time    Microalbumin/Creat ratio (mg/g creat) 136 (H) 2021 09:05 AM    Microalbumin,urine random 12.50 2021 09:05 AM         Past Medical History:   Diagnosis Date    Hypercholesterolemia     Hypertension        Family History   Problem Relation Age of Onset    Diabetes Father     Hypertension Father        Social History     Tobacco Use    Smoking status: Former Smoker     Quit date: 1992     Years since quittin.1    Smokeless tobacco: Never Used   Substance Use Topics    Alcohol use: No    Drug use: No       ROS:  Per HPI    PE:  Visit Vitals  /77 (BP 1 Location: Right arm, BP Patient Position: Sitting, BP Cuff Size: Adult)   Pulse 79   Temp 97.6 °F (36.4 °C) (Oral)   Resp 22   Ht 6' 1\" (1.854 m)   Wt 303 lb (137.4 kg)   SpO2 94%   BMI 39.98 kg/m²     Gen: Pt sitting in chair, in NAD  Head: Normocephalic, atraumatic  Eyes: Sclera anicteric, EOM grossly intact, PERRL  Ears: TM's pearly with good light reflex b/l  Nose: Normal nasal mucosa  Throat: MMM, normal lips, tongue and gums  Neck: Supple, no LAD, no thyromegaly or carotid bruits  CVS: Normal S1, S2, no m/r/g  Resp: CTAB, no wheezes or rales  Extrem: Atraumatic, no cyanosis or edema  Pulses: 2+   Skin: Warm, dry  Neuro: Alert, oriented, appropriate      A/P:   Encounter Diagnoses     ICD-10-CM ICD-9-CM   1. Uncontrolled type 2 diabetes mellitus with diabetic nephropathy, with long-term current use of insulin ([facility])  E11.21 250.42    E11.65 583.81    Z79.4 V58.67   2. Essential hypertension  I10 401.9   3. Hyperlipidemia, unspecified hyperlipidemia type  E78.5 272.4   4. Leg cramps  R25.2 729.82   5. Elevated LFTs  R79.89 790.6     1. Essential hypertension: Well controlled today. Home numbers are elevated but possibly 2/2 inaccurate home cuff. Pt to bring to next visit to check against ours. - lisinopril-hydroCHLOROthiazide (PRINZIDE, ZESTORETIC) 20-25 mg per tablet; Take 1 tablet by mouth once daily  Dispense: 90 Tablet; Refill: 1  - metoprolol tartrate (LOPRESSOR) 50 mg tablet; Take 1 tablet by mouth twice daily  Dispense: 180 Tablet; Refill: 1  - verapamil ER (CALAN-SR) 180 mg CR tablet; Take 1 Tablet by mouth nightly. Dispense: 90 Tablet; Refill: 1  - METABOLIC PANEL, COMPREHENSIVE; Future  - METABOLIC PANEL, COMPREHENSIVE    2. Hyperlipidemia, unspecified hyperlipidemia type: Statin could be related to muscle cramping. Will check labs to r/o electrolyte imbalance and iron deficiency, and if normal will discuss a 2 week trial off statin to see if muscle cramping improves. - pravastatin (PRAVACHOL) 40 mg tablet;  Take 1 tablet by mouth nightly  Dispense: 90 Tablet; Refill: 1  - LIPID PANEL; Future  - LIPID PANEL    3. Uncontrolled type 2 diabetes mellitus with diabetic nephropathy, with long-term current use of insulin ([address]): Home BG values sound considerably improved from prior. Pt having some hypoglycemia but related only to skipping meals/eating later than normal. Will check labs and see if there are any changes we can make to his regimen. Options limited 2/2 no insurance, cost of medications. - metFORMIN (GLUCOPHAGE) 1,000 mg tablet; Take 1 Tablet by mouth two (2) times daily (with meals). Dispense: 180 Tablet; Refill: 1  - HEMOGLOBIN A1C WITH EAG; Future  - MICROALBUMIN, UR, RAND W/ MICROALB/CREAT RATIO; Future  - MICROALBUMIN, UR, RAND W/ MICROALB/CREAT RATIO  - HEMOGLOBIN A1C WITH EAG    4. Leg cramps: As above. May also be related to arthritis/pain in lower back. No XR available in clinic for a few more weeks. Handout given on low back exercises and will proceed with work-up as above. - IRON PROFILE; Future  - FERRITIN; Future  - FERRITIN  - IRON PROFILE    5. Elevated LFTs: Rechecking CMP today and if still elevated will then get US and hepatitis panel. Going piecewise with work-up to be as cost effective as possible. - FERRITIN; Future  - FERRITIN       RTC in 6 months for f/u chronic conditions, or sooner prn    Discussed diagnoses in detail with patient. Medication risks/benefits/side effects discussed with patient. All of the patient's questions were addressed. The patient understands and agrees with our plan of care. The patient knows to call back if they are unsure of or forget any changes we discussed today or if the symptoms change. The patient received an After-Visit Summary which contains VS, orders, medication list and allergy list. This can be used as a \"mini-medical record\" should they have to seek medical care while out of town.     Current Outpatient Medications on File Prior to Visit   Medication Sig Dispense Refill    liraglutide (VICTOZA) 0.6 mg/0.1 mL (18 mg/3 mL) pnij 1.8 mg by SubCUTAneous route daily. Increase each week by 1 click on pen as symptoms tolerate. Indications: type 2 diabetes mellitus 1 Adjustable Dose Pre-filled Pen Syringe 1    glipiZIDE (GLUCOTROL) 10 mg tablet Take 1 Tab by mouth two (2) times a day. 180 Tab 1    triamcinolone acetonide (KENALOG) 0.025 % topical cream Use thin layer. Apply to R hand and wrist twice a day as needed until rash resolves. 15 g 2    nicotinic acid (NIACIN) 500 mg tablet Take 500 mg by mouth Daily (before breakfast).  Insulin Needles, Disposable, 31 X 5/16 \" ndle Inject hs for DM-2 250.00 1 Package 11    Lancets misc Test TID for DM-2 treated with insulin (250.00) 1 Package 11    aspirin (ASPIRIN) 325 mg tablet Take 1 Tab by mouth daily. 90 Tab 12    glucose blood VI test strips (BLOOD GLUCOSE TEST) strip Test once a day for DM-2 treated with insulin. 100 Strip 1     No current facility-administered medications on file prior to visit.

## 2021-08-05 NOTE — PROGRESS NOTES
1. Have you been to the ER, urgent care clinic since your last visit? Hospitalized since your last visit? No    2. Have you seen or consulted any other health care providers outside of the [address] [place] [address] since your last visit? Include any pap smears or colon screening. No    Reviewed record in preparation for visit and have necessary documentation  Goals that were addressed and/or need to be completed during or after this appointment include     Health Maintenance Due   Topic Date Due    Shingrix Vaccine Age 49> (1 of 2) Never done    DTaP/Tdap/Td series (1 - Tdap) 01/22/2011    Colorectal Cancer Screening Combo  06/25/2019    A1C test (Diabetic or Prediabetic)  06/04/2021       Patient is accompanied by self I have received verbal consent from Clifford Bolden to discuss any/all medical information while they are present in the room.

## 2021-08-05 NOTE — PATIENT INSTRUCTIONS
Back Stretches: Exercises  Introduction  Here are some examples of exercises for stretching your back. Start each exercise slowly. Ease off the exercise if you start to have pain. Your doctor or physical therapist will tell you when you can start these exercises and which ones will work best for you. How to do the exercises  Overhead stretch   1. Stand comfortably with your feet shoulder-width apart. 2. Looking straight ahead, raise both arms over your head and reach toward the ceiling. Do not allow your head to tilt back. 3. Hold for 15 to 30 seconds, then lower your arms to your sides. 4. Repeat 2 to 4 times. Side stretch   1. Stand comfortably with your feet shoulder-width apart. 2. Raise one arm over your head, and then lean to the other side. 3. Slide your hand down your leg as you let the weight of your arm gently stretch your side muscles. Hold for 15 to 30 seconds. 4. Repeat 2 to 4 times on each side. Press-up   1. Lie on your stomach, supporting your body with your forearms. 2. Press your elbows down into the floor to raise your upper back. As you do this, relax your stomach muscles and allow your back to arch without using your back muscles. As your press up, do not let your hips or pelvis come off the floor. 3. Hold for 15 to 30 seconds, then relax. 4. Repeat 2 to 4 times. Relax and rest   1. Lie on your back with a rolled towel under your neck and a pillow under your knees. Extend your arms comfortably to your sides. 2. Relax and breathe normally. 3. Remain in this position for about 10 minutes. 4. If you can, do this 2 or 3 times each day. Follow-up care is a key part of your treatment and safety. Be sure to make and go to all appointments, and call your doctor if you are having problems. It's also a good idea to know your test results and keep a list of the medicines you take. Where can you learn more?   Go to http://www.gray.com/  Enter Y090 in the search box to learn more about \"Back Stretches: Exercises. \"  Current as of: November 16, 2020               Content Version: 12.8  © 2006-2021 [employer]. Care instructions adapted under license by [employer] (which disclaims liability or warranty for this information). If you have questions about a medical condition or this instruction, always ask your healthcare professional. [place] [address] any warranty or liability for your use of this information.

## 2021-08-06 LAB
ALBUMIN SERPL-MCNC: 4 G/DL (ref 3.5–5)
ALBUMIN/GLOB SERPL: 1.3 {RATIO} (ref 1.1–2.2)
ALP SERPL-CCNC: 54 U/L (ref 45–117)
ALT SERPL-CCNC: 56 U/L (ref 12–78)
ANION GAP SERPL CALC-SCNC: 7 MMOL/L (ref 5–15)
AST SERPL-CCNC: 24 U/L (ref 15–37)
BILIRUB SERPL-MCNC: 0.4 MG/DL (ref 0.2–1)
BUN SERPL-MCNC: 28 MG/DL (ref 6–20)
BUN/CREAT SERPL: 24 (ref 12–20)
CALCIUM SERPL-MCNC: 9.5 MG/DL (ref 8.5–10.1)
CHLORIDE SERPL-SCNC: 107 MMOL/L (ref 97–108)
CHOLEST SERPL-MCNC: 147 MG/DL
CO2 SERPL-SCNC: 23 MMOL/L (ref 21–32)
CREAT SERPL-MCNC: 1.18 MG/DL (ref 0.7–1.3)
CREAT UR-MCNC: 112 MG/DL
EST. AVERAGE GLUCOSE BLD GHB EST-MCNC: 200 MG/DL
FERRITIN SERPL-MCNC: 163 NG/ML (ref 26–388)
GLOBULIN SER CALC-MCNC: 3.1 G/DL (ref 2–4)
GLUCOSE SERPL-MCNC: 169 MG/DL (ref 65–100)
HBA1C MFR BLD: 8.6 % (ref 4–5.6)
HDLC SERPL-MCNC: 44 MG/DL
HDLC SERPL: 3.3 {RATIO} (ref 0–5)
IRON SATN MFR SERPL: 21 % (ref 20–50)
IRON SERPL-MCNC: 79 UG/DL (ref 35–150)
LDLC SERPL CALC-MCNC: 69.4 MG/DL (ref 0–100)
MICROALBUMIN UR-MCNC: 6.48 MG/DL
MICROALBUMIN/CREAT UR-RTO: 58 MG/G (ref 0–30)
POTASSIUM SERPL-SCNC: 4.5 MMOL/L (ref 3.5–5.1)
PROT SERPL-MCNC: 7.1 G/DL (ref 6.4–8.2)
SODIUM SERPL-SCNC: 137 MMOL/L (ref 136–145)
TIBC SERPL-MCNC: 373 UG/DL (ref 250–450)
TRIGL SERPL-MCNC: 168 MG/DL (ref ?–150)
VLDLC SERPL CALC-MCNC: 33.6 MG/DL

## 2021-08-10 ENCOUNTER — TELEPHONE (OUTPATIENT)
Dept: FAMILY MEDICINE CLINIC | Age: 66
End: 2021-08-10

## 2021-08-18 NOTE — TELEPHONE ENCOUNTER
He should stay off the pravastatin for the next month and then can do trial of fluvastatin, which is less likely to cause this. It is $29 for a 30 day supply at [facility] with goodrx coupon. If this is doable I will send the rx to the pharmacy but he should wait a full month before starting it.

## 2021-08-18 NOTE — TELEPHONE ENCOUNTER
Informed pt per Dr Pablo Murphy  He should stay off the pravastatin for the next month and then can do trial of fluvastatin, which is less likely to cause this. It is $29 for a 30 day supply at [facility] with goodrx coupon. If this is doable I will send the rx to the pharmacy but he should wait a full month before starting it.      He verbalized understanding and agrees

## 2021-08-18 NOTE — TELEPHONE ENCOUNTER
Caller's first and last name: Pt       Reason for call: Pt would like to report that the night he stopped taking pravastatin, the leg cramps stopped.  Would like to discuss this with the provider       Callback required yes/no and why: Yes       Best contact number(s): [phone]       Details to clarify the request: [address]

## 2021-09-14 ENCOUNTER — TELEPHONE (OUTPATIENT)
Dept: FAMILY MEDICINE CLINIC | Age: 66
End: 2021-09-14

## 2021-09-14 NOTE — TELEPHONE ENCOUNTER
T/C made to the patient to let him know that per my conversation today with [employer], his refill order of Victoza should be delivered to Dr. Amaya Allison office tomorrow, 09-15-21. He also confirmed that he needs to send me a copy of his 2020 tax return in order to complete his PAP application for Jardiance. He has my contact information and will send it to me as soon as he can.  Jess Donovan RN

## 2021-11-15 ENCOUNTER — DOCUMENTATION ONLY (OUTPATIENT)
Dept: FAMILY MEDICINE CLINIC | Age: 66
End: 2021-11-15

## 2021-11-15 NOTE — PROGRESS NOTES
After a delay in receiving a copy of the patient's current proof of income (2020 tax return), his application for Jardiance 10mg, t tab daily, was faxed to [address] [place] [address] with a note requesting that the patient's order be shipped to the provider's office [facility]).  Ivette Franklin RN

## 2021-11-29 NOTE — TELEPHONE ENCOUNTER
The medication Sabineroverto Frost is giving him leg cramps and he is urinating a lot. He wants to discontinue it. Please call Pt with directions.

## 2021-11-29 NOTE — TELEPHONE ENCOUNTER
Attempted to call. unsuccesful.  message left  Pt will need to schedule an appt to discuss medications

## 2021-12-06 ENCOUNTER — OFFICE VISIT (OUTPATIENT)
Dept: FAMILY MEDICINE CLINIC | Age: 66
End: 2021-12-06

## 2021-12-06 VITALS
BODY MASS INDEX: 39.76 KG/M2 | WEIGHT: 300 LBS | DIASTOLIC BLOOD PRESSURE: 79 MMHG | SYSTOLIC BLOOD PRESSURE: 149 MMHG | RESPIRATION RATE: 20 BRPM | OXYGEN SATURATION: 95 % | HEART RATE: 82 BPM | TEMPERATURE: 98.3 F | HEIGHT: 73 IN

## 2021-12-06 DIAGNOSIS — I10 PRIMARY HYPERTENSION: Primary | ICD-10-CM

## 2021-12-06 PROCEDURE — 3052F HG A1C>EQUAL 8.0%<EQUAL 9.0%: CPT | Performed by: FAMILY MEDICINE

## 2021-12-06 PROCEDURE — 99214 OFFICE O/P EST MOD 30 MIN: CPT | Performed by: FAMILY MEDICINE

## 2021-12-06 NOTE — PATIENT INSTRUCTIONS
Jeff He with [facility]  [address] [place] [address], [facility] [address] Lisa [address] [place] [address]  [phone]    Monitor blood pressure outside the office several times weekly at different times during the day and evening. Bring the record to me in 3 weeks for review.     Blood Pressure Record     Patient Name:  ______________________ :  ______________________    Date/Time BP Reading Pulse

## 2021-12-06 NOTE — PROGRESS NOTES
CC: f/u DM, HTN, and HLD    HPI: Pt is a 61 y.o. male who presents for f/u DM, HTN and HLD. He reports that he did a trial of Jardiance and it caused bad muscle cramps and polyuria so he stopped. He would really like to get the Shingrix vaccine and is interested in options for patient assistance. HTN:  Checking BPs at home?: YES  Logs today?: NO  Range of BPs?: 120-130's/70's  Headaches?: NO  Blurry vision?: NO  Lower extremity edema?: NO  Smoking?: NO    DM:  Checking BG at home?: YES, sometimes at random times of the day  Logs today?: NO  BG range: 160's max  Insulin dependent?: NO  Other medications for DM?: Metformin 1000mg BID, Victoza 1.8mg, glipizide 10mg BID  Symptoms of hypoglycemia?: YES, had one episode a few days ago when he ate lunch late. Improved with food. Last episode before that was months ago.    Aspirin?: YES  ACEi/ARB?: YES  Statin?: YES  Last eye exam?: 2021  Last foot exam?: 3/2021  Last A1c:   Lab Results   Component Value Date/Time    Hemoglobin A1c 8.6 (H) 2021 08:50 AM    Hemoglobin A1c (POC) 7.4 2019 08:37 AM     LastLDL:   Lab Results   Component Value Date/Time    LDL, calculated 69.4 2021 08:50 AM     Last microalbumin:   Lab Results   Component Value Date/Time    Microalbumin/Creat ratio (mg/g creat) 58 (H) 2021 08:50 AM    Microalbumin,urine random 6.48 2021 08:50 AM           Past Medical History:   Diagnosis Date    Hypercholesterolemia     Hypertension        Family History   Problem Relation Age of Onset    Diabetes Father     Hypertension Father        Social History     Tobacco Use    Smoking status: Former Smoker     Quit date: 1992     Years since quittin.5    Smokeless tobacco: Never Used   Substance Use Topics    Alcohol use: No    Drug use: No       ROS:  Per HPI    PE:  Visit Vitals  BP (!) 149/79   Pulse 82   Temp 98.3 °F (36.8 °C)   Resp 20   Ht 6' 1\" (1.854 m)   Wt 300 lb (136.1 kg)   SpO2 95%   BMI 39.58 kg/m² Gen: Pt sitting in chair, in NAD  Head: Normocephalic, atraumatic  Eyes: Sclera anicteric, EOM grossly intact, PERRL  Throat: MMM, normal lips, tongue and gums  Neck: Supple, no LAD, no thyromegaly or carotid bruits  CVS: Normal S1, S2, no m/r/g  Resp: CTAB, no wheezes or rales  Extrem: Atraumatic, no cyanosis or edema  Feet: Skin intact, no lesions. DP pulses 2+ b/l. Sensation intact to light touch and monofilament throughout. Pulses: 2+   Skin: Warm, dry  Neuro: Alert, oriented, appropriate      A/P:   Encounter Diagnoses     ICD-10-CM ICD-9-CM   1. Primary hypertension  I10 401.9   2. Uncontrolled type 2 diabetes mellitus with diabetic nephropathy, with long-term current use of insulin ([facility])  E11.21 250.42    E11.65 583.81    Z79.4 V58.67     1. Primary hypertension: BP elevated in office today but pt reports good control at home. He will continue to check and call if >/=988/56  - METABOLIC PANEL, BASIC; Future  - METABOLIC PANEL, BASIC    2. Uncontrolled type 2 diabetes mellitus with diabetic nephropathy, with long-term current use of insulin ([facility] [address]): BG's sound better than previous, will get A1c.   - HEMOGLOBIN A1C WITH EAG; Future  - HEMOGLOBIN A1C WITH EAG       RTC in 6 months for f/u chronic conditions, or sooner prn    Discussed diagnoses in detail with patient. Medication risks/benefits/side effects discussed with patient. All of the patient's questions were addressed. The patient understands and agrees with our plan of care. The patient knows to call back if they are unsure of or forget any changes we discussed today or if the symptoms change. The patient received an After-Visit Summary which contains VS, orders, medication list and allergy list. This can be used as a \"mini-medical record\" should they have to seek medical care while out of town.     Current Outpatient Medications on File Prior to Visit   Medication Sig Dispense Refill    metoprolol tartrate (LOPRESSOR) 50 mg tablet Take 1 tablet by mouth twice daily 180 Tablet 1    metFORMIN (GLUCOPHAGE) 1,000 mg tablet TAKE 1 TABLET BY MOUTH TWICE DAILY WITH MEALS 180 Tablet 1    lisinopril-hydroCHLOROthiazide (PRINZIDE, ZESTORETIC) 20-25 mg per tablet Take 1 tablet by mouth once daily 90 Tablet 1    pravastatin (PRAVACHOL) 40 mg tablet Take 1 tablet by mouth nightly 90 Tablet 1    verapamil ER (CALAN-SR) 180 mg CR tablet Take 1 Tablet by mouth nightly. 90 Tablet 1    liraglutide (VICTOZA) 0.6 mg/0.1 mL (18 mg/3 mL) pnij 1.8 mg by SubCUTAneous route daily. Increase each week by 1 click on pen as symptoms tolerate. Indications: type 2 diabetes mellitus 1 Adjustable Dose Pre-filled Pen Syringe 1    glipiZIDE (GLUCOTROL) 10 mg tablet Take 1 Tab by mouth two (2) times a day. 180 Tab 1    triamcinolone acetonide (KENALOG) 0.025 % topical cream Use thin layer. Apply to R hand and wrist twice a day as needed until rash resolves. 15 g 2    glucose blood VI test strips (BLOOD GLUCOSE TEST) strip Test once a day for DM-2 treated with insulin. 100 Strip 1    nicotinic acid (NIACIN) 500 mg tablet Take 500 mg by mouth Daily (before breakfast).  Insulin Needles, Disposable, 31 X 5/16 \" ndle Inject hs for DM-2 250.00 1 Package 11    Lancets misc Test TID for DM-2 treated with insulin (250.00) 1 Package 11    aspirin (ASPIRIN) 325 mg tablet Take 1 Tab by mouth daily. 90 Tab 12     No current facility-administered medications on file prior to visit.

## 2021-12-06 NOTE — PROGRESS NOTES
1. Have you been to the ER, urgent care clinic since your last visit? Hospitalized since your last visit? No    2. Have you seen or consulted any other health care providers outside of the [address] [place] [address] since your last visit? Include any pap smears or colon screening.  No  Reviewed record in preparation for visit and have necessary documentation  Pt did not bring medication to office visit for review    Goals that were addressed and/or need to be completed during or after this appointment include   Health Maintenance Due   Topic Date Due    Shingrix Vaccine Age 49> (1 of 2) Never done    DTaP/Tdap/Td series (1 - Tdap) 01/22/2011    Colorectal Cancer Screening Combo  06/25/2019    COVID-19 Vaccine (3 - Booster for Zayas Peter series) 09/22/2021

## 2021-12-07 LAB
ANION GAP SERPL CALC-SCNC: 5 MMOL/L (ref 5–15)
BUN SERPL-MCNC: 26 MG/DL (ref 6–20)
BUN/CREAT SERPL: 17 (ref 12–20)
CALCIUM SERPL-MCNC: 9.8 MG/DL (ref 8.5–10.1)
CHLORIDE SERPL-SCNC: 108 MMOL/L (ref 97–108)
CO2 SERPL-SCNC: 25 MMOL/L (ref 21–32)
CREAT SERPL-MCNC: 1.49 MG/DL (ref 0.7–1.3)
EST. AVERAGE GLUCOSE BLD GHB EST-MCNC: 220 MG/DL
GLUCOSE SERPL-MCNC: 199 MG/DL (ref 65–100)
HBA1C MFR BLD: 9.3 % (ref 4–5.6)
POTASSIUM SERPL-SCNC: 4.8 MMOL/L (ref 3.5–5.1)
SODIUM SERPL-SCNC: 138 MMOL/L (ref 136–145)

## 2021-12-17 ENCOUNTER — TELEPHONE (OUTPATIENT)
Dept: FAMILY MEDICINE CLINIC | Age: 66
End: 2021-12-17

## 2021-12-17 NOTE — TELEPHONE ENCOUNTER
Called to discuss recent results with pt. A1c is unfortunately worse. Discussed options of adding insulin vs switching Victoza to Trulicity. Unfortunately options are limited given lack of insurance and need to rely on medications that have a patient assistance program. Also he has tried [place] and it gave him side effects of muscle cramping and too frequent urination. He would like to try the Trulicity first. Pt will come by the office to fill out his part of the application and then we can fax it to [employer]. Will need to recheck A1c in 3 months. Discussed importance of diabetic diet and lifestyle modifications as well. Kidney function also bumped from last time. This has happened in the past and improved on recheck. Advised him to drink enough water and will recheck in 3 months. If still elevated at that time will need to get him in with Nephro. All questions answered and pt feels comfortable with the plan of care.

## 2022-01-03 ENCOUNTER — NURSE TRIAGE (OUTPATIENT)
Dept: OTHER | Facility: CLINIC | Age: 67
End: 2022-01-03

## 2022-01-03 NOTE — TELEPHONE ENCOUNTER
Received call from Levar at [facility], caller not on line. Complaint: fatigue no energy, only wants to sleep. No appetite, top of head painful with sinus hurting. Ongoing 1 week.  worsening 1 since after sharron     Market: [facility] Name: [facility] telephone number verified as [phone]    Unsuccessful attempt to re-connect with caller via phone, left message for return call to office     Reason for Disposition   Message left on identified voicemail    Protocols used: NO CONTACT OR DUPLICATE CONTACT CALL-ADULT-[place]   call attempted at 10:54 am.

## 2022-01-05 ENCOUNTER — OFFICE VISIT (OUTPATIENT)
Dept: FAMILY MEDICINE CLINIC | Age: 67
End: 2022-01-05

## 2022-01-05 VITALS
TEMPERATURE: 98.4 F | BODY MASS INDEX: 39.63 KG/M2 | WEIGHT: 299 LBS | HEART RATE: 95 BPM | DIASTOLIC BLOOD PRESSURE: 83 MMHG | SYSTOLIC BLOOD PRESSURE: 142 MMHG | OXYGEN SATURATION: 92 % | RESPIRATION RATE: 14 BRPM | HEIGHT: 73 IN

## 2022-01-05 DIAGNOSIS — J06.9 UPPER RESPIRATORY TRACT INFECTION, UNSPECIFIED TYPE: ICD-10-CM

## 2022-01-05 DIAGNOSIS — R05.9 COUGH: ICD-10-CM

## 2022-01-05 DIAGNOSIS — E66.01 OBESITY, MORBID (HCC): ICD-10-CM

## 2022-01-05 DIAGNOSIS — U07.1 CLINICAL DIAGNOSIS OF COVID-19: Primary | ICD-10-CM

## 2022-01-05 LAB
QUICKVUE INFLUENZA TEST: NEGATIVE
VALID INTERNAL CONTROL?: YES

## 2022-01-05 PROCEDURE — 99214 OFFICE O/P EST MOD 30 MIN: CPT | Performed by: FAMILY MEDICINE

## 2022-01-05 PROCEDURE — 87804 INFLUENZA ASSAY W/OPTIC: CPT | Performed by: FAMILY MEDICINE

## 2022-01-05 RX ORDER — PREDNISONE 10 MG/1
TABLET ORAL
Qty: 21 TABLET | Refills: 0 | Status: SHIPPED | OUTPATIENT
Start: 2022-01-05 | End: 2022-03-11

## 2022-01-05 RX ORDER — AZITHROMYCIN 250 MG/1
TABLET, FILM COATED ORAL
Qty: 6 TABLET | Refills: 0 | Status: SHIPPED | OUTPATIENT
Start: 2022-01-05 | End: 2022-01-10

## 2022-01-05 NOTE — PROGRESS NOTES
1. Have you been to the ER, urgent care clinic since your last visit? Hospitalized since your last visit? No    2. Have you seen or consulted any other health care providers outside of the [address] [place] [address] since your last visit? Include any pap smears or colon screening. No    Reviewed record in preparation for visit and have necessary documentation  Pt did not bring medication to office visit for review  Patient is accompanied by self I have received verbal consent from Francia Lancaster to discuss any/all medical information while they are present in the room.     Goals that were addressed and/or need to be completed during or after this appointment include     Health Maintenance Due   Topic Date Due    Shingrix Vaccine Age 49> (1 of 2) Never done    DTaP/Tdap/Td series (1 - Tdap) 01/22/2011    Colorectal Cancer Screening Combo  06/25/2019    COVID-19 Vaccine (3 - Booster for Zayas Peter series) 09/22/2021

## 2022-01-07 LAB
SARS-COV-2, NAA 2 DAY TAT: NORMAL
SARS-COV-2, NAA: NOT DETECTED

## 2022-01-10 ENCOUNTER — TELEPHONE (OUTPATIENT)
Dept: FAMILY MEDICINE CLINIC | Age: 67
End: 2022-01-10

## 2022-01-10 NOTE — PROGRESS NOTES
Patient: Wes Galdamez MRN: [id number]  SSN: [id number]    YOB: 1955  Age: 61 y.o. Sex: male      Chief Complaint   Patient presents with    Cold Symptoms     cough, chills,      Wes Galdamez is a 61 y.o. male presents with Covid symptoms of congestion, productive cough, myalgias, fever and malaise for 10 days. Symptoms are moderate. Patient is drinking plenty of fluids. There have not been contacts with similar infections. Medications:     Current Outpatient Medications   Medication Sig    predniSONE (STERAPRED DS) 10 mg dose pack See administration instruction per 10mg dose pack    azithromycin (ZITHROMAX) 250 mg tablet Take 2 tablets today, then take 1 tablet daily    metoprolol tartrate (LOPRESSOR) 50 mg tablet Take 1 tablet by mouth twice daily    metFORMIN (GLUCOPHAGE) 1,000 mg tablet TAKE 1 TABLET BY MOUTH TWICE DAILY WITH MEALS    lisinopril-hydroCHLOROthiazide (PRINZIDE, ZESTORETIC) 20-25 mg per tablet Take 1 tablet by mouth once daily    pravastatin (PRAVACHOL) 40 mg tablet Take 1 tablet by mouth nightly    verapamil ER (CALAN-SR) 180 mg CR tablet Take 1 Tablet by mouth nightly.  glipiZIDE (GLUCOTROL) 10 mg tablet Take 1 Tab by mouth two (2) times a day.  triamcinolone acetonide (KENALOG) 0.025 % topical cream Use thin layer. Apply to R hand and wrist twice a day as needed until rash resolves.  glucose blood VI test strips (BLOOD GLUCOSE TEST) strip Test once a day for DM-2 treated with insulin.  nicotinic acid (NIACIN) 500 mg tablet Take 500 mg by mouth Daily (before breakfast).  Insulin Needles, Disposable, 31 X 5/16 \" ndle Inject hs for DM-2 250.00    Lancets misc Test TID for DM-2 treated with insulin (250.00)    aspirin (ASPIRIN) 325 mg tablet Take 1 Tab by mouth daily.  liraglutide (VICTOZA) 0.6 mg/0.1 mL (18 mg/3 mL) pnij 1.8 mg by SubCUTAneous route daily. Increase each week by 1 click on pen as symptoms tolerate.   Indications: type 2 diabetes mellitus (Patient not taking: Reported on 2022)     No current facility-administered medications for this visit. Problem List:     Patient Active Problem List    Diagnosis Date Noted    Obesity, morbid ([facility] [address] 2018    Uncontrolled type 2 diabetes mellitus with diabetic nephropathy, with long-term current use of insulin ([facility] 2018    Lumbar compression fracture ([facility] 06/15/2011    HTN (hypertension) 2010    Hyperlipidemia 2010    Obesity     Dysmetabolic syndrome X        Medical History:     Past Medical History:   Diagnosis Date    Hypercholesterolemia     Hypertension        Allergies:      Allergies   Allergen Reactions    Cyclobenzaprine Other (comments)     Leg cramps    Percocet [Oxycodone-Acetaminophen] Nausea Only       Surgical History:     Past Surgical History:   Procedure Laterality Date    HX ORTHOPAEDIC  2004    rt shoulder surgery    HX ORTHOPAEDIC      fx lft arm       Social History:     Social History     Socioeconomic History    Marital status:    Tobacco Use    Smoking status: Former Smoker     Quit date: 1992     Years since quittin.6    Smokeless tobacco: Never Used   Substance and Sexual Activity    Alcohol use: No    Drug use: No    Sexual activity: Yes     Partners: Female       Review of Symptoms:  Constitutional: c/o malaise, fever   Skin: Negative for rash or lesion  Head: Negative for facial swelling or tenderness  Eyes: Negative for redness or discharge  Ears: Negative for otalgia or decreased hearing  Nose: c/o nasal congestion, denies sinus pressure  Neck: Negative for sore throat, lymphadenopathy   Cardiovascular: Negative for chest pain or palpitations  Respiratory: c/o non-productive cough, denies wheezing or SOB  Gastrointestinal: Negative for nausea or abdominal pain  Neurologic: Negative for headache or dizziness      Visit Vitals  BP (!) 142/83 (BP 1 Location: Right upper arm, BP Patient Position: Sitting, BP Cuff Size: Large adult)   Pulse 95   Temp 98.4 °F (36.9 °C) (Oral)   Resp 14   Ht 6' 1\" (1.854 m)   Wt 299 lb (135.6 kg)   SpO2 92%   BMI 39.45 kg/m²       Physical Examination:  General: Well developed, well nourished, in no acute distress  Skin: Warm and dry sans rash or lesion  Head: Normocephalic, atraumatic  Eyes: Sclera clear, EOMI  Neck: Normal range of motion, no lymphadenopathy  Cardiovascular: normal S1, S2, regular rate and rhythm  Respiratory: Course breath sounds bilaterally with symmetrical, unlabored effort  Abdomen: Soft, Normal BS  Extremities: Full range of motion  Neurologic: Active, alert and oriented      Diagnoses and all orders for this visit:    1. Clinical diagnosis of COVID-19  -     XR CHEST PA LAT; Future  -     predniSONE (STERAPRED DS) 10 mg dose pack; See administration instruction per 10mg dose pack    2. Upper respiratory tract infection, unspecified type  -     NOVEL CORONAVIRUS (COVID-19)  -     XR CHEST PA LAT; Future  -     AMB POC RAPID INFLUENZA TEST  -     predniSONE (STERAPRED DS) 10 mg dose pack; See administration instruction per 10mg dose pack  -     azithromycin (ZITHROMAX) 250 mg tablet; Take 2 tablets today, then take 1 tablet daily  -     SARS-COV-2, DEAN 2 DAY TAT    3. Cough  -     NOVEL CORONAVIRUS (COVID-19)  -     XR CHEST PA LAT; Future  -     AMB POC RAPID INFLUENZA TEST  -     predniSONE (STERAPRED DS) 10 mg dose pack; See administration instruction per 10mg dose pack  -     SARS-COV-2, DEAN 2 DAY TAT    4. Uncontrolled type 2 diabetes mellitus with diabetic nephropathy, with long-term current use of insulin ([facility])    5. Obesity, morbid ([facility]        Plan of Care:  Symptomatic therapy suggested: rest, increase fluids and call prn if symptoms persist or worsen. Diagnoses were discussed in detail with patient. Medication risks/benefits/side effects discussed with patient.    All of the patient's questions were addressed and answered to apparent satisfaction. The patient understands and agrees with our plan of care. The patient knows to call back if they have questions about the plan of care or if symptoms change. The patient received an After-Visit Summary which contains VS, diagnoses, orders, allergy and medication lists.       Future Appointments   Date Time Provider Shay Carlos   6/6/2022  8:00 AM Sharifa Hamm MD [facility]

## 2022-01-11 ENCOUNTER — TELEPHONE (OUTPATIENT)
Dept: FAMILY MEDICINE CLINIC | Age: 67
End: 2022-01-11

## 2022-01-11 NOTE — TELEPHONE ENCOUNTER
Patient diagnosed with Covid-19 due to symptoms and chest xray. Covid-19 test negative, though this is most likely due to length of time from onset of symptoms.

## 2022-01-11 NOTE — TELEPHONE ENCOUNTER
Pt would like Results from his xray. Pt asked to pass along a msg to Dr Awais Solano that he is grateful for such a wonderful Dr to fit him and his family in for a visit.

## 2022-01-11 NOTE — TELEPHONE ENCOUNTER
Called and spoke to patient. Advised her per Dr. Eleanor Penn:     \"Patient diagnosed with Covid-19 due to symptoms and chest xray. Covid-19 test negative, though this is most likely due to length of time from onset of symptoms. \"    Pt verbalized understanding.

## 2022-01-11 NOTE — TELEPHONE ENCOUNTER
Attempted to call. No answer. Message left. Would like to advised patient of msg from Dr. Baldemar Hammond.

## 2022-03-11 ENCOUNTER — OFFICE VISIT (OUTPATIENT)
Dept: FAMILY MEDICINE CLINIC | Age: 67
End: 2022-03-11

## 2022-03-11 VITALS
BODY MASS INDEX: 39.57 KG/M2 | TEMPERATURE: 98.1 F | OXYGEN SATURATION: 94 % | HEART RATE: 87 BPM | RESPIRATION RATE: 18 BRPM | HEIGHT: 73 IN | DIASTOLIC BLOOD PRESSURE: 76 MMHG | WEIGHT: 298.6 LBS | SYSTOLIC BLOOD PRESSURE: 131 MMHG

## 2022-03-11 DIAGNOSIS — J30.2 SEASONAL ALLERGIC RHINITIS, UNSPECIFIED TRIGGER: Primary | ICD-10-CM

## 2022-03-11 DIAGNOSIS — I10 PRIMARY HYPERTENSION: ICD-10-CM

## 2022-03-11 DIAGNOSIS — E66.01 OBESITY, MORBID (HCC): ICD-10-CM

## 2022-03-11 DIAGNOSIS — R05.9 COUGH: ICD-10-CM

## 2022-03-11 PROCEDURE — 99214 OFFICE O/P EST MOD 30 MIN: CPT | Performed by: FAMILY MEDICINE

## 2022-03-11 RX ORDER — MONTELUKAST SODIUM 10 MG/1
10 TABLET ORAL DAILY
Qty: 30 TABLET | Refills: 1 | Status: SHIPPED | OUTPATIENT
Start: 2022-03-11 | End: 2022-11-01

## 2022-03-11 RX ORDER — FLUTICASONE PROPIONATE 50 MCG
2 SPRAY, SUSPENSION (ML) NASAL DAILY
Qty: 1 EACH | Refills: 1 | Status: SHIPPED | OUTPATIENT
Start: 2022-03-11 | End: 2022-11-01

## 2022-03-11 NOTE — PROGRESS NOTES
1. Have you been to the ER, urgent care clinic since your last visit? Hospitalized since your last visit? No    2. Have you seen or consulted any other health care providers outside of the [address] [place] [address] since your last visit? Include any pap smears or colon screening. No    3. For patients aged 39-70: Has the patient had a colonoscopy / FIT/ Cologuard? Home stool test 2020/2021     If the patient is female:    4. For patients aged 41-77: Has the patient had a mammogram within the past 2 years? NA - based on age or sex      11. For patients aged 21-65: Has the patient had a pap smear? NA - based on age or sex     Reviewed record in preparation for visit and have necessary documentation  Pt did not bring medication to office visit for review  Patient is accompanied by self I have received verbal consent from Gurmeet Cornell to discuss any/all medical information while they are present in the room.     Goals that were addressed and/or need to be completed during or after this appointment include     Health Maintenance Due   Topic Date Due    Pneumococcal 0-64 years (1 of 2 - PPSV23) Never done    Shingrix Vaccine Age 50> (1 of 2) Never done    DTaP/Tdap/Td series (1 - Tdap) 01/22/2011    Colorectal Cancer Screening Combo  06/25/2019    COVID-19 Vaccine (3 - Booster for Pfizer series) 08/22/2021    Foot Exam Q1  03/04/2022    A1C test (Diabetic or Prediabetic)  03/06/2022

## 2022-03-13 NOTE — PROGRESS NOTES
Patient: Hadley Green MRN: [id number]  SSN: [id number]    YOB: 1955  Age: 61 y.o. Sex: male      Chief Complaint   Patient presents with    Follow-up     sob on exertion, little appetite, dry mouth, sinus issues pain on left side of head       Hadley Green is a 61 y.o. male presents with complaints of MUJICA, dry mouth, poor appetite and left sinus pain for several weeks. Patient with recent hx of Covid-19. There has been no nausea and no vomiting. he has not had  swollen glands, myalgias and fever. Symptoms are moderate. Patient [employer] health insurance. BP Readings from Last 3 Encounters:   03/11/22 131/76   01/05/22 (!) 142/83   12/06/21 (!) 149/79     Wt Readings from Last 3 Encounters:   03/11/22 298 lb 9.6 oz (135.4 kg)   01/05/22 299 lb (135.6 kg)   12/06/21 300 lb (136.1 kg)     Body mass index is 39.4 kg/m². Lab Results   Component Value Date/Time    Hemoglobin A1c 9.3 (H) 12/06/2021 09:00 AM    Hemoglobin A1c (POC) 7.4 04/22/2019 08:37 AM        Medications:     Current Outpatient Medications   Medication Sig    dulaglutide (TRULICITY SC) by SubCUTAneous route.  fluticasone propionate (FLONASE) 50 mcg/actuation nasal spray 2 Sprays by Both Nostrils route daily.  montelukast (SINGULAIR) 10 mg tablet Take 1 Tablet by mouth daily.  metoprolol tartrate (LOPRESSOR) 50 mg tablet Take 1 tablet by mouth twice daily    metFORMIN (GLUCOPHAGE) 1,000 mg tablet TAKE 1 TABLET BY MOUTH TWICE DAILY WITH MEALS    lisinopril-hydroCHLOROthiazide (PRINZIDE, ZESTORETIC) 20-25 mg per tablet Take 1 tablet by mouth once daily    pravastatin (PRAVACHOL) 40 mg tablet Take 1 tablet by mouth nightly    verapamil ER (CALAN-SR) 180 mg CR tablet Take 1 Tablet by mouth nightly.  glipiZIDE (GLUCOTROL) 10 mg tablet Take 1 Tab by mouth two (2) times a day.  glucose blood VI test strips (BLOOD GLUCOSE TEST) strip Test once a day for DM-2 treated with insulin.     nicotinic acid (NIACIN) 500 mg tablet Take 500 mg by mouth Daily (before breakfast).  Lancets misc Test TID for DM-2 treated with insulin (250.00)    aspirin (ASPIRIN) 325 mg tablet Take 1 Tab by mouth daily.  Insulin Needles, Disposable, 31 X 5/16 \" ndle Inject hs for DM-2 250.00     No current facility-administered medications for this visit. Problem List:     Patient Active Problem List    Diagnosis Date Noted    Obesity, morbid ([facility] 2018    Uncontrolled type 2 diabetes mellitus with diabetic nephropathy, with long-term current use of insulin ([facility] 2018    Lumbar compression fracture ([facility] 06/15/2011    HTN (hypertension) 2010    Hyperlipidemia 2010    Obesity     Dysmetabolic syndrome X        Medical History:     Past Medical History:   Diagnosis Date    Hypercholesterolemia     Hypertension        Allergies:      Allergies   Allergen Reactions    Cyclobenzaprine Other (comments)     Leg cramps    Percocet [Oxycodone-Acetaminophen] Nausea Only       Surgical History:     Past Surgical History:   Procedure Laterality Date    HX ORTHOPAEDIC  2004    rt shoulder surgery    HX ORTHOPAEDIC      fx lft arm       Social History:     Social History     Socioeconomic History    Marital status:    Tobacco Use    Smoking status: Former Smoker     Quit date: 1992     Years since quittin.8    Smokeless tobacco: Never Used   Substance and Sexual Activity    Alcohol use: No    Drug use: No    Sexual activity: Yes     Partners: Female       Review of Symptoms:  Constitutional: Negative for malaise, fever   Skin: Negative for rash or lesion  Head: Negative for facial swelling or tenderness  Eyes: Negative for redness or discharge  Ears: Negative for otalgia or decreased hearing  Nose: c/o nasal congestion and left sided sinus pressure  Neck: Negative for sore throat, lymphadenopathy   Cardiovascular: Negative for chest pain or palpitations  Respiratory: c/o ndry cough and MUJICA  Gastrointestinal: Negative for nausea or abdominal pain  Neurologic: Negative for headache or dizziness      Visit Vitals  /76 (BP 1 Location: Left upper arm, BP Patient Position: Sitting, BP Cuff Size: Large adult)   Pulse 87   Temp 98.1 °F (36.7 °C) (Oral)   Resp 18   Ht 6' 1\" (1.854 m)   Wt 298 lb 9.6 oz (135.4 kg)   SpO2 94%   BMI 39.40 kg/m²       Physical Examination:  General: Well developed, well nourished, in no acute distress  Skin: Warm and dry sans rash or lesion  Head: Normocephalic, atraumatic  Eyes: Sclera clear, EOMI  Neck: Normal range of motion, no lymphadenopathy  Cardiovascular: normal S1, S2, regular rate and rhythm  Respiratory: Course breath sounds bilaterally with symmetrical, unlabored effort  Extremities: Full range of motion  Neurologic: Active, alert and oriented      Diagnoses and all orders for this visit:    1. Seasonal allergic rhinitis, unspecified trigger  -     fluticasone propionate (FLONASE) 50 mcg/actuation nasal spray; 2 Sprays by Both Nostrils route daily. -     montelukast (SINGULAIR) 10 mg tablet; Take 1 Tablet by mouth daily. 2. Cough    3. Primary hypertension    4. Uncontrolled type 2 diabetes mellitus with diabetic nephropathy, with long-term current use of insulin ([facility])    5. Obesity, morbid ([address]        Plan of Care:  Symptomatic therapy suggested: rest, increase fluids, nasal saline and call prn if symptoms persist or worsen. Diagnoses were discussed in detail with patient. Medication risks/benefits/side effects discussed with patient. All of the patient's questions were addressed and answered to apparent satisfaction. The patient understands and agrees with our plan of care. The patient knows to call back if they have questions about the plan of care or if symptoms change. The patient received an After-Visit Summary which contains VS, diagnoses, orders, allergy and medication lists.       Future Appointments   Date Time Provider Department [place]   6/6/2022  8:00 AM Nilo Crowell MD [facility]

## 2022-03-20 PROBLEM — E66.01 OBESITY, MORBID (HCC): Status: ACTIVE | Noted: 2018-01-25

## 2022-04-02 NOTE — TELEPHONE ENCOUNTER
----- Message from Jaime Duke sent at 3/31/2021 11:17 AM EDT -----  Regarding: Dr. Denver Reynolds (if not patient): Ai      Relationship of caller (if not patient): wife      Best contact number(s):[phone]      Name of medication and dosage if known: \"Pravastatin\", \"Lisinopril\", \"Metoprolol\" \"Glypizide\"        Is patient out of this medication (yes/no): yes      Pharmacy name: [place] listed in chart? (yes/no): yes  Pharmacy phone number: unknown      Details to clarify the request: N/A      Jaime Duke You can access the FollowMyHealth Patient Portal offered by [facility] by registering at the following website: [email or web address]. By joining [facility]’s FollowMyHealth portal, you will also be able to view your health information using other applications (apps) compatible with our system.

## 2022-04-26 DIAGNOSIS — I10 ESSENTIAL HYPERTENSION: ICD-10-CM

## 2022-04-26 DIAGNOSIS — E11.9 TYPE 2 DIABETES MELLITUS WITHOUT COMPLICATION, WITHOUT LONG-TERM CURRENT USE OF INSULIN (HCC): ICD-10-CM

## 2022-04-26 RX ORDER — METFORMIN HYDROCHLORIDE 1000 MG/1
TABLET ORAL
Qty: 180 TABLET | Refills: 0 | Status: SHIPPED | OUTPATIENT
Start: 2022-04-26 | End: 2022-08-12

## 2022-04-26 RX ORDER — LISINOPRIL AND HYDROCHLOROTHIAZIDE 20; 25 MG/1; MG/1
TABLET ORAL
Qty: 90 TABLET | Refills: 0 | Status: SHIPPED | OUTPATIENT
Start: 2022-04-26 | End: 2022-08-12

## 2022-04-26 RX ORDER — GLIPIZIDE 10 MG/1
TABLET ORAL
Qty: 180 TABLET | Refills: 0 | Status: SHIPPED | OUTPATIENT
Start: 2022-04-26

## 2022-05-31 DIAGNOSIS — I10 ESSENTIAL HYPERTENSION: ICD-10-CM

## 2022-05-31 RX ORDER — VERAPAMIL HYDROCHLORIDE 180 MG/1
180 TABLET, EXTENDED RELEASE ORAL
Qty: 90 TABLET | Refills: 0 | Status: SHIPPED | OUTPATIENT
Start: 2022-05-31 | End: 2022-10-05 | Stop reason: SDUPTHER

## 2022-06-06 ENCOUNTER — OFFICE VISIT (OUTPATIENT)
Dept: FAMILY MEDICINE CLINIC | Age: 67
End: 2022-06-06
Payer: MEDICARE

## 2022-06-06 VITALS
DIASTOLIC BLOOD PRESSURE: 78 MMHG | SYSTOLIC BLOOD PRESSURE: 133 MMHG | WEIGHT: 299 LBS | HEART RATE: 74 BPM | TEMPERATURE: 98.7 F | BODY MASS INDEX: 39.63 KG/M2 | HEIGHT: 73 IN | OXYGEN SATURATION: 94 % | RESPIRATION RATE: 20 BRPM

## 2022-06-06 DIAGNOSIS — R06.02 SHORTNESS OF BREATH: ICD-10-CM

## 2022-06-06 DIAGNOSIS — E78.2 MIXED HYPERLIPIDEMIA: ICD-10-CM

## 2022-06-06 DIAGNOSIS — I10 PRIMARY HYPERTENSION: Primary | ICD-10-CM

## 2022-06-06 DIAGNOSIS — E11.65 UNCONTROLLED TYPE 2 DIABETES MELLITUS WITH HYPERGLYCEMIA (HCC): ICD-10-CM

## 2022-06-06 DIAGNOSIS — Z23 ENCOUNTER FOR IMMUNIZATION: ICD-10-CM

## 2022-06-06 PROBLEM — E11.9 TYPE II DIABETES MELLITUS (HCC): Status: ACTIVE | Noted: 2018-01-25

## 2022-06-06 PROCEDURE — 99214 OFFICE O/P EST MOD 30 MIN: CPT | Performed by: FAMILY MEDICINE

## 2022-06-06 RX ORDER — ZOSTER VACCINE RECOMBINANT, ADJUVANTED 50 MCG/0.5
0.5 KIT INTRAMUSCULAR ONCE
Qty: 0.5 ML | Refills: 0 | Status: SHIPPED | OUTPATIENT
Start: 2022-06-06 | End: 2022-06-06

## 2022-06-06 NOTE — PATIENT INSTRUCTIONS
Shortness of Breath: Care Instructions  Your Care Instructions     Shortness of breath has many causes. Sometimes conditions such as anxiety can lead to shortness of breath. Some people get mild shortness of breath when they exercise. Trouble breathing also can be a symptom of a serious problem, such as asthma, lung disease, emphysema, heart problems, and pneumonia. If your shortness of breath continues, you may need tests and treatment. Watch for any changes in your breathing and other symptoms. Follow-up care is a key part of your treatment and safety. Be sure to make and go to all appointments, and call your doctor if you are having problems. It's also a good idea to know your test results and keep a list of the medicines you take. How can you care for yourself at home? · Do not smoke or allow others to smoke around you. If you need help quitting, talk to your doctor about stop-smoking programs and medicines. These can increase your chances of quitting for good. · Get plenty of rest and sleep. · Take your medicines exactly as prescribed. Call your doctor if you think you are having a problem with your medicine. · Find healthy ways to deal with stress. ? Exercise daily. ? Get plenty of sleep. ? Eat regularly and well. When should you call for help? Call 911 anytime you think you may need emergency care. For example, call if:    · You have severe shortness of breath.     · You have symptoms of a heart attack. These may include:  ? Chest pain or pressure, or a strange feeling in the chest.  ? Sweating. ? Shortness of breath. ? Nausea or vomiting. ? Pain, pressure, or a strange feeling in the back, neck, jaw, or upper belly or in one or both shoulders or arms. ? Lightheadedness or sudden weakness. ? A fast or irregular heartbeat. After you call 911, the  may tell you to chew 1 adult-strength or 2 to 4 low-dose aspirin. Wait for an ambulance. Do not try to drive yourself.    Call your doctor now or seek immediate medical care if:    · Your shortness of breath gets worse or you start to wheeze. Wheezing is a high-pitched sound when you breathe.     · You wake up at night out of breath or have to prop your head up on several pillows to breathe.     · You are short of breath after only light activity or while at rest.   Watch closely for changes in your health, and be sure to contact your doctor if:    · You do not get better over the next 1 to 2 days. Where can you learn more? Go to http://www.gray.com/  Enter S780 in the search box to learn more about \"Shortness of Breath: Care Instructions. \"  Current as of: July 6, 2021               Content Version: 13.2  © 2006-2022 [employer]. Care instructions adapted under license by [employer] (which disclaims liability or warranty for this information). If you have questions about a medical condition or this instruction, always ask your healthcare professional. [place] [address] any warranty or liability for your use of this information.

## 2022-06-06 NOTE — PROGRESS NOTES
1. Have you been to the ER, urgent care clinic since your last visit? Hospitalized since your last visit? No    2. Have you seen or consulted any other health care providers outside of the [address] [place] [address] since your last visit? Include any pap smears or colon screening. No  Reviewed record in preparation for visit and have necessary documentation  Pt did not bring medication to office visit for review  opportunity was given for questions      3. For patients aged 39-70: Has the patient had a colonoscopy / FIT/ Cologuard? No      If the patient is female:    4. For patients aged 41-77: Has the patient had a mammogram within the past 2 years? NA - based on age or sex      11. For patients aged 21-65: Has the patient had a pap smear?  NA - based on age or sex      Goals that were addressed and/or need to be completed during or after this appointment include   Health Maintenance Due   Topic Date Due    Pneumococcal 0-64 years (1 - PCV) Never done    Shingrix Vaccine Age 50> (1 of 2) Never done    DTaP/Tdap/Td series (1 - Tdap) 01/22/2011    Colorectal Cancer Screening Combo  06/25/2019    COVID-19 Vaccine (3 - Booster for Pfizer series) 08/22/2021    Foot Exam Q1  03/04/2022    A1C test (Diabetic or Prediabetic)  03/06/2022

## 2022-06-06 NOTE — PROGRESS NOTES
CC: f/u HTN, HLD and DM    HPI: Pt is a 61 y.o. male who presents for f/u HTN, HLD and DM. He had COVID PNA in January and is still having shortness of breath. He feels like symptoms have gotten slightly better but he is not back to his normal baseline yet. Denies pain and swelling in legs and racing heart.     HTN:  Checking BPs at home?: YES, sometimes  Logs today?: NO  Range of BPs?: 130's/70's  Headaches?: NO  Blurry vision?: NO  Lower extremity edema?: NO  Smoking?: NO    DM:  Checking BG at home?: NO  Insulin dependent?: NO  Other medications for DM?: Glipizide 10mg BID, metformin 0395UG BID, Trulicity 8.1TU weekly  Symptoms of hypoglycemia?: Every once in a while when he doesn't eat  Aspirin?: YES  ACEi/ARB?: YES  Statin?: YES  Last eye exam?: Juanito Vance 9038 2022 - will request records  Last foot exam?: 3/2021  Last A1c:   Lab Results   Component Value Date/Time    Hemoglobin A1c 9.3 (H) 2021 09:00 AM    Hemoglobin A1c (POC) 7.4 2019 08:37 AM     LastLDL:   Lab Results   Component Value Date/Time    LDL, calculated 69.4 2021 08:50 AM     Last microalbumin:   Lab Results   Component Value Date/Time    Microalbumin/Creat ratio (mg/g creat) 58 (H) 2021 08:50 AM    Microalbumin,urine random 6.48 2021 08:50 AM         Past Medical History:   Diagnosis Date    Diabetes ([facility] [address]     Hypercholesterolemia     Hypertension        Family History   Problem Relation Age of Onset    Diabetes Father     Hypertension Father        Social History     Tobacco Use    Smoking status: Former Smoker     Packs/day: 2.00     Years: 30.00     Pack years: 60.00     Quit date: 1992     Years since quittin.0    Smokeless tobacco: Never Used   Substance Use Topics    Alcohol use: No    Drug use: No       ROS:  Per HPI    PE:  Visit Vitals  /78   Pulse 74   Temp 98.7 °F (37.1 °C)   Resp 20   Ht 6' 1\" (1.854 m)   Wt 299 lb (135.6 kg)   SpO2 94%   BMI 39.45 kg/m²     Gen: Pt sitting in chair, in NAD  Head: Normocephalic, atraumatic  Eyes: Sclera anicteric, EOM grossly intact, PERRL  Nose: Normal nasal mucosa  Throat: MMM, normal lips, tongue and gums  Neck: Supple  CVS: Normal S1, S2, no m/r/g  Resp: CTAB, no wheezes or rales. Good air movement throughout. Abd: Soft, non-tender, non-distended, +BS  Extrem: Atraumatic, no cyanosis or edema. No palpable cords or tenderness in calves  Feet: Skin intact, no lesions. DP pulses 2+ b/l. Sensation intact to light touch and monofilament throughout. Pulses: 2+   Skin: Warm, dry  Neuro: Alert, oriented, appropriate      A/P:   Encounter Diagnoses     ICD-10-CM ICD-9-CM   1. Primary hypertension  I10 401.9   2. Mixed hyperlipidemia  E78.2 272.2   3. Uncontrolled type 2 diabetes mellitus with hyperglycemia (HCC)  E11.65 250.02   4. Shortness of breath  R06.02 786.05   5. Encounter for immunization  Z23 V03.89     1. Primary hypertension: Stable, continue current medications.  - METABOLIC PANEL, COMPREHENSIVE; Future    2. Mixed hyperlipidemia:   - LIPID PANEL; Future    3. Uncontrolled type 2 diabetes mellitus with hyperglycemia ([facility] [address]): Hoping labs will look better with switch from Victoza to Trulicity. He is on max dose of Trulicity now. -  DIABETES FOOT EXAM  - HEMOGLOBIN A1C WITH EAG; Future  - MICROALBUMIN, UR, RAND W/ MICROALB/CREAT RATIO; Future    4. Shortness of breath: Could be long COVID. Given h/o COVID PNA, will check CXR and go from there. Low concern for PE (Wells score 0). - XR CHEST PA LAT; Future    5. Encounter for immunization  - varicella-zoster recombinant, PF, (Shingrix, PF,) 50 mcg/0.5 mL susr injection; 0.5 mL by IntraMUSCular route once for 1 dose. Dispense: 0.5 mL; Refill: 0       RTC in 6 months for f/u chronic conditions, or sooner prn    Discussed diagnoses in detail with patient. Medication risks/benefits/side effects discussed with patient. All of the patient's questions were addressed.  The patient understands and agrees with our plan of care. The patient knows to call back if they are unsure of or forget any changes we discussed today or if the symptoms change. The patient received an After-Visit Summary which contains VS, orders, medication list and allergy list. This can be used as a \"mini-medical record\" should they have to seek medical care while out of town. Current Outpatient Medications on File Prior to Visit   Medication Sig Dispense Refill    verapamil ER (CALAN-SR) 180 mg CR tablet Take 1 Tablet by mouth nightly. 90 Tablet 0    glipiZIDE (GLUCOTROL) 10 mg tablet Take 1 tablet by mouth twice daily 180 Tablet 0    lisinopril-hydroCHLOROthiazide (PRINZIDE, ZESTORETIC) 20-25 mg per tablet Take 1 tablet by mouth once daily 90 Tablet 0    metFORMIN (GLUCOPHAGE) 1,000 mg tablet TAKE 1 TABLET BY MOUTH TWICE DAILY WITH MEALS 180 Tablet 0    dulaglutide (TRULICITY SC) by SubCUTAneous route.  fluticasone propionate (FLONASE) 50 mcg/actuation nasal spray 2 Sprays by Both Nostrils route daily. 1 Each 1    montelukast (SINGULAIR) 10 mg tablet Take 1 Tablet by mouth daily. 30 Tablet 1    metoprolol tartrate (LOPRESSOR) 50 mg tablet Take 1 tablet by mouth twice daily 180 Tablet 1    pravastatin (PRAVACHOL) 40 mg tablet Take 1 tablet by mouth nightly 90 Tablet 1    glucose blood VI test strips (BLOOD GLUCOSE TEST) strip Test once a day for DM-2 treated with insulin. 100 Strip 1    nicotinic acid (NIACIN) 500 mg tablet Take 500 mg by mouth Daily (before breakfast).  Insulin Needles, Disposable, 31 X 5/16 \" ndle Inject hs for DM-2 250.00 1 Package 11    Lancets misc Test TID for DM-2 treated with insulin (250.00) 1 Package 11    aspirin (ASPIRIN) 325 mg tablet Take 1 Tab by mouth daily. 90 Tab 12     No current facility-administered medications on file prior to visit.

## 2022-06-07 ENCOUNTER — TELEPHONE (OUTPATIENT)
Dept: FAMILY MEDICINE CLINIC | Age: 67
End: 2022-06-07

## 2022-06-07 LAB
ALBUMIN SERPL-MCNC: 3.9 G/DL (ref 3.5–5)
ALBUMIN/GLOB SERPL: 1.3 {RATIO} (ref 1.1–2.2)
ALP SERPL-CCNC: 55 U/L (ref 45–117)
ALT SERPL-CCNC: 65 U/L (ref 12–78)
ANION GAP SERPL CALC-SCNC: 9 MMOL/L (ref 5–15)
AST SERPL-CCNC: 29 U/L (ref 15–37)
BILIRUB SERPL-MCNC: 0.4 MG/DL (ref 0.2–1)
BUN SERPL-MCNC: 24 MG/DL (ref 6–20)
BUN/CREAT SERPL: 17 (ref 12–20)
CALCIUM SERPL-MCNC: 9.9 MG/DL (ref 8.5–10.1)
CHLORIDE SERPL-SCNC: 105 MMOL/L (ref 97–108)
CHOLEST SERPL-MCNC: 199 MG/DL
CO2 SERPL-SCNC: 23 MMOL/L (ref 21–32)
CREAT SERPL-MCNC: 1.41 MG/DL (ref 0.7–1.3)
CREAT UR-MCNC: 124 MG/DL
EST. AVERAGE GLUCOSE BLD GHB EST-MCNC: 180 MG/DL
GLOBULIN SER CALC-MCNC: 3.1 G/DL (ref 2–4)
GLUCOSE SERPL-MCNC: 181 MG/DL (ref 65–100)
HBA1C MFR BLD: 7.9 % (ref 4–5.6)
HDLC SERPL-MCNC: 41 MG/DL
HDLC SERPL: 4.9 {RATIO} (ref 0–5)
LDLC SERPL CALC-MCNC: 120.4 MG/DL (ref 0–100)
MICROALBUMIN UR-MCNC: 9.53 MG/DL
MICROALBUMIN/CREAT UR-RTO: 77 MG/G (ref 0–30)
POTASSIUM SERPL-SCNC: 4.9 MMOL/L (ref 3.5–5.1)
PROT SERPL-MCNC: 7 G/DL (ref 6.4–8.2)
SODIUM SERPL-SCNC: 137 MMOL/L (ref 136–145)
TRIGL SERPL-MCNC: 188 MG/DL (ref ?–150)
VLDLC SERPL CALC-MCNC: 37.6 MG/DL

## 2022-06-07 NOTE — TELEPHONE ENCOUNTER
Called to advise pt of recent results. Left message to call back. 1. Diabetes is improving but still a little higher than I'd like it. Will discuss options for patient assistance for Alfredo or Mariana Jensen when he calls back. 2. Kidney function stable from last time but still worse than his baseline. Would like him to see Nephrology. GFR 51, do not need to change metformin dose. 3. Cholesterol worse than last time. Work on diet and need to make sure he still has pravastatin. Per chart review it looks like he may have run out. 4. CXR clear. Shows some hyperinflation which could be related to COPD from his smoking history. Would like to send him to Pulmonology for PFT's/further testing.

## 2022-06-14 ENCOUNTER — TELEPHONE (OUTPATIENT)
Dept: FAMILY MEDICINE CLINIC | Age: 67
End: 2022-06-14

## 2022-06-14 DIAGNOSIS — E78.5 HYPERLIPIDEMIA, UNSPECIFIED HYPERLIPIDEMIA TYPE: ICD-10-CM

## 2022-06-14 DIAGNOSIS — N18.31 STAGE 3A CHRONIC KIDNEY DISEASE (HCC): Primary | ICD-10-CM

## 2022-06-14 RX ORDER — PRAVASTATIN SODIUM 40 MG/1
TABLET ORAL
Qty: 90 TABLET | Refills: 1 | Status: SHIPPED | OUTPATIENT
Start: 2022-06-14

## 2022-06-14 NOTE — TELEPHONE ENCOUNTER
Called again to advise pt of recent results. 1. Diabetes is improving but still a little higher than I'd like it. Would like to do patient assistance for Jardiance or Atkinson. He is amenable to this and would like to  the forms at the . Discussed common side effects of Jardiance or Atkinson and pt advised of what to look out for/when to call the office.      2. Kidney function stable from last time but still worse than his baseline. Would like him to see Nephrology. He is amenable to this. Referral placed. .      3. Cholesterol worse than last time. Work on diet and need to make sure he still has pravastatin. Per chart review it looks like he may have run out. Pt states he has run out for short periods of time but never for a long time. Advised him to work on diet and will send refill.      4. CXR clear. Shows some hyperinflation which could be related to COPD from his smoking history. Would like to send him to Pulmonology for PFT's/further testing. He would prefer to hold off on this for now and see if his lung function improves with time. He feels like everything was doing well until he had COVID and it has been slowly improving since then. He will call if he changes his mind. All questions answered and pt feels comfortable with the plan of care. Will leave patient assistance forms up front for him.

## 2022-06-23 ENCOUNTER — TELEPHONE (OUTPATIENT)
Dept: FAMILY MEDICINE CLINIC | Age: 67
End: 2022-06-23

## 2022-06-23 DIAGNOSIS — E78.2 MIXED HYPERLIPIDEMIA: Primary | ICD-10-CM

## 2022-06-27 ENCOUNTER — TELEPHONE (OUTPATIENT)
Dept: FAMILY MEDICINE CLINIC | Age: 67
End: 2022-06-27

## 2022-06-27 NOTE — TELEPHONE ENCOUNTER
Pt wife states that Mr Niurka Magallanes has been calling the Kidney specialist and he couldn't get no answer. Asked Mrs Niurka Magallanes details on the name and number that were given to pt and she did not have any detail.      Please call pt wife Harvey Pelon at [phone]

## 2022-06-27 NOTE — TELEPHONE ENCOUNTER
Per pt's wife request to assist with scheduling a referral appointment    Spoke with Dr Abbey Noguera office   appt to be scheduled July 21 at 130pm    The office requests records be sent  Please send records to fax [phone]    Please send pt letter with office address and info on appt

## 2022-06-28 NOTE — TELEPHONE ENCOUNTER
Returned pt's call and left message to call back. Chart review shows that he had been on pravastatin before and the same thing happened. The plan was for him to take some time off the statin and then try fluvastatin, which at the time was not overly expensive at [facility], however now appears to be over $100. When he calls back can discuss trying Zetia instead which is more cost effective.

## 2022-07-01 ENCOUNTER — TELEPHONE (OUTPATIENT)
Dept: FAMILY MEDICINE CLINIC | Age: 67
End: 2022-07-01

## 2022-07-01 RX ORDER — EZETIMIBE 10 MG/1
10 TABLET ORAL DAILY
Qty: 30 TABLET | Refills: 5 | Status: SHIPPED | OUTPATIENT
Start: 2022-07-01

## 2022-07-01 NOTE — TELEPHONE ENCOUNTER
Spoke with patient. Informed him per Dr. Tory Rosenbaum. Chart review shows that he had been on pravastatin before and the same thing happened. The plan was for him to take some time off the statin and then try fluvastatin, which at the time was not overly expensive at [facility], however now appears to be over $100. When he calls back can discuss trying Zetia instead which is more cost effective. Patient verbalizes understanding and agrees to zetia. Please send to his [place].

## 2022-07-06 ENCOUNTER — TELEPHONE (OUTPATIENT)
Dept: FAMILY MEDICINE CLINIC | Age: 67
End: 2022-07-06

## 2022-07-06 NOTE — TELEPHONE ENCOUNTER
Unfortunately there are no alternatives I would recommend at this time for medications. I recommend he work on lifestyle changes for weight loss, lower carb and lower fat diet and we will continue to check his cholesterol levels with routine labs.

## 2022-08-10 DIAGNOSIS — I10 ESSENTIAL HYPERTENSION: ICD-10-CM

## 2022-08-12 RX ORDER — LISINOPRIL AND HYDROCHLOROTHIAZIDE 20; 25 MG/1; MG/1
TABLET ORAL
Qty: 90 TABLET | Refills: 1 | Status: SHIPPED | OUTPATIENT
Start: 2022-08-12

## 2022-08-12 RX ORDER — METFORMIN HYDROCHLORIDE 1000 MG/1
TABLET ORAL
Qty: 180 TABLET | Refills: 1 | Status: SHIPPED | OUTPATIENT
Start: 2022-08-12

## 2022-08-25 ENCOUNTER — TELEPHONE (OUTPATIENT)
Dept: FAMILY MEDICINE CLINIC | Age: 67
End: 2022-08-25

## 2022-09-16 DIAGNOSIS — I10 ESSENTIAL HYPERTENSION: ICD-10-CM

## 2022-09-16 RX ORDER — METOPROLOL TARTRATE 50 MG/1
TABLET ORAL
Qty: 180 TABLET | Refills: 0 | Status: SHIPPED | OUTPATIENT
Start: 2022-09-16

## 2022-10-05 DIAGNOSIS — I10 ESSENTIAL HYPERTENSION: ICD-10-CM

## 2022-10-07 RX ORDER — VERAPAMIL HYDROCHLORIDE 180 MG/1
180 TABLET, EXTENDED RELEASE ORAL
Qty: 90 TABLET | Refills: 1 | Status: SHIPPED | OUTPATIENT
Start: 2022-10-07

## 2022-11-01 ENCOUNTER — OFFICE VISIT (OUTPATIENT)
Dept: FAMILY MEDICINE CLINIC | Age: 67
End: 2022-11-01
Payer: MEDICARE

## 2022-11-01 VITALS
HEIGHT: 73 IN | RESPIRATION RATE: 22 BRPM | HEART RATE: 85 BPM | BODY MASS INDEX: 39.76 KG/M2 | SYSTOLIC BLOOD PRESSURE: 136 MMHG | TEMPERATURE: 98.2 F | WEIGHT: 300 LBS | DIASTOLIC BLOOD PRESSURE: 80 MMHG | OXYGEN SATURATION: 95 %

## 2022-11-01 DIAGNOSIS — G89.29 HIP PAIN, CHRONIC, RIGHT: ICD-10-CM

## 2022-11-01 DIAGNOSIS — M54.41 CHRONIC MIDLINE LOW BACK PAIN WITH RIGHT-SIDED SCIATICA: Primary | ICD-10-CM

## 2022-11-01 DIAGNOSIS — G89.29 CHRONIC MIDLINE LOW BACK PAIN WITH RIGHT-SIDED SCIATICA: Primary | ICD-10-CM

## 2022-11-01 DIAGNOSIS — N18.31 STAGE 3A CHRONIC KIDNEY DISEASE (HCC): ICD-10-CM

## 2022-11-01 DIAGNOSIS — M25.551 HIP PAIN, CHRONIC, RIGHT: ICD-10-CM

## 2022-11-01 PROBLEM — N18.30 CHRONIC RENAL DISEASE, STAGE III (HCC): Status: ACTIVE | Noted: 2022-11-01

## 2022-11-01 PROCEDURE — 1123F ACP DISCUSS/DSCN MKR DOCD: CPT | Performed by: FAMILY MEDICINE

## 2022-11-01 PROCEDURE — 3074F SYST BP LT 130 MM HG: CPT | Performed by: FAMILY MEDICINE

## 2022-11-01 PROCEDURE — 99214 OFFICE O/P EST MOD 30 MIN: CPT | Performed by: FAMILY MEDICINE

## 2022-11-01 PROCEDURE — 3078F DIAST BP <80 MM HG: CPT | Performed by: FAMILY MEDICINE

## 2022-11-01 RX ORDER — METHYLPREDNISOLONE 4 MG/1
4 TABLET ORAL
Qty: 1 DOSE PACK | Refills: 0 | Status: SHIPPED | OUTPATIENT
Start: 2022-11-01

## 2022-11-01 NOTE — PATIENT INSTRUCTIONS
Low Back Pain: Exercises  Introduction  Here are some examples of exercises for you to try. The exercises may be suggested for a condition or for rehabilitation. Start each exercise slowly. Ease off the exercises if you start to have pain. You will be told when to start these exercises and which ones will work best for you. How to do the exercises  Press-up  Lie on your stomach, supporting your body with your forearms. Press your elbows down into the floor to raise your upper back. As you do this, relax your stomach muscles and allow your back to arch without using your back muscles. As your press up, do not let your hips or pelvis come off the floor. Hold for 15 to 30 seconds, then relax. Repeat 2 to 4 times. Alternate arm and leg (bird dog) exercise  Do this exercise slowly. Try to keep your body straight at all times, and do not let one hip drop lower than the other. Start on the floor, on your hands and knees. Tighten your belly muscles. Raise one leg off the floor, and hold it straight out behind you. Be careful not to let your hip drop down, because that will twist your trunk. Hold for about 6 seconds, then lower your leg and switch to the other leg. Repeat 8 to 12 times on each leg. Over time, work up to holding for 10 to 30 seconds each time. If you feel stable and secure with your leg raised, try raising the opposite arm straight out in front of you at the same time. Knee-to-chest exercise  Lie on your back with your knees bent and your feet flat on the floor. Bring one knee to your chest, keeping the other foot flat on the floor (or keeping the other leg straight, whichever feels better on your lower back). Keep your lower back pressed to the floor. Hold for at least 15 to 30 seconds. Relax, and lower the knee to the starting position. Repeat with the other leg. Repeat 2 to 4 times with each leg.   To get more stretch, put your other leg flat on the floor while pulling your knee to your chest.  Curl-ups  Lie on the floor on your back with your knees bent at a 90-degree angle. Your feet should be flat on the floor, about 12 inches from your buttocks. Cross your arms over your chest. If this bothers your neck, try putting your hands behind your neck (not your head), with your elbows spread apart. Slowly tighten your belly muscles and raise your shoulder blades off the floor. Keep your head in line with your body, and do not press your chin to your chest.  Hold this position for 1 or 2 seconds, then slowly lower yourself back down to the floor. Repeat 8 to 12 times. Pelvic tilt exercise  Lie on your back with your knees bent. \"Brace\" your stomach. This means to tighten your muscles by pulling in and imagining your belly button moving toward your spine. You should feel like your back is pressing to the floor and your hips and pelvis are rocking back. Hold for about 6 seconds while you breathe smoothly. Repeat 8 to 12 times. Heel dig bridging  Lie on your back with both knees bent and your ankles bent so that only your heels are digging into the floor. Your knees should be bent about 90 degrees. Then push your heels into the floor, squeeze your buttocks, and lift your hips off the floor until your shoulders, hips, and knees are all in a straight line. Hold for about 6 seconds as you continue to breathe normally, and then slowly lower your hips back down to the floor and rest for up to 10 seconds. Do 8 to 12 repetitions. Hamstring stretch in doorway  Lie on your back in a doorway, with one leg through the open door. Slide your leg up the wall to straighten your knee. You should feel a gentle stretch down the back of your leg. Hold the stretch for at least 15 to 30 seconds. Do not arch your back, point your toes, or bend either knee. Keep one heel touching the floor and the other heel touching the wall. Repeat with your other leg. Do 2 to 4 times for each leg.   Hip flexor stretch  Kneel on the floor with one knee bent and one leg behind you. Place your forward knee over your foot. Keep your other knee touching the floor. Slowly push your hips forward until you feel a stretch in the upper thigh of your rear leg. Hold the stretch for at least 15 to 30 seconds. Repeat with your other leg. Do 2 to 4 times on each side. Wall sit  Stand with your back 10 to 12 inches away from a wall. Lean into the wall until your back is flat against it. Slowly slide down until your knees are slightly bent, pressing your lower back into the wall. Hold for about 6 seconds, then slide back up the wall. Repeat 8 to 12 times. Follow-up care is a key part of your treatment and safety. Be sure to make and go to all appointments, and call your doctor if you are having problems. It's also a good idea to know your test results and keep a list of the medicines you take. Current as of: March 9, 2022               Content Version: 13.4  © 2006-2022 [employer]. Care instructions adapted under license by [employer] (which disclaims liability or warranty for this information). If you have questions about a medical condition or this instruction, always ask your healthcare professional. [place] [address] any warranty or liability for your use of this information. RECOMMENDATIONS given include: Recommended increased dietary fluid for constipation. Advised colace, fiber every day; use senna and miralax prn. F/U with MD if symptoms worsen or fail to resolve or for new associated symptoms.

## 2022-11-01 NOTE — PROGRESS NOTES
1. \"Have you been to the ER, urgent care clinic since your last visit? Hospitalized since your last visit? \" No    2. \"Have you seen or consulted any other health care providers outside of the [address] [place] [address] since your last visit? \" No     3. For patients aged 39-70: Has the patient had a colonoscopy / FIT/ Cologuard? No      If the patient is female:    4. For patients aged 41-77: Has the patient had a mammogram within the past 2 years? NA - based on age or sex      11. For patients aged 21-65: Has the patient had a pap smear?  NA - based on age or sex    Health Maintenance Due   Topic Date Due    Pneumococcal 65+ years (1 - PCV) Never done    Shingrix Vaccine Age 50> (1 of 2) Never done    DTaP/Tdap/Td series (1 - Tdap) 01/22/2011    Colorectal Cancer Screening Combo  06/25/2019    COVID-19 Vaccine (3 - Booster for Pfizer series) 05/17/2021    AAA Screening 73-69 YO Male Smoking Patients  Never done    Medicare Yearly Exam  11/01/2022

## 2022-11-01 NOTE — PROGRESS NOTES
Chief Complaint   Patient presents with    Hip Pain     Right hip pain. Hip pain has been going on for a while. Pain started in right buttocks now it is in the bend of his leg. Subjective:      Steff Londono is an 74 y.o. male who presents for evaluation and treatment of back pain/hip pain. Has been bothering him for 2-3 months, worse past 2 weeks. Taking tylenol 325 mg 3 tabs once daily. He says the pain starts in his right hip comes around to his groin area and goes down his leg. He has numbness down into the right leg. He is also noticed swelling. The pain he said the pain is worse with walking or standing. He has minimal pain and when he first gets up-- it gets worse as the day goes on. Worse with bending over or rotation. He checks his sugars and so they run about 160s after eating. Low in the 120s. Of note when he was 25years old he fell 30 feet in broke his back per his report. Past Medical History:   Diagnosis Date    Diabetes ([facility] [address]     Hypercholesterolemia     Hypertension      Family History   Problem Relation Age of Onset    Diabetes Father     Hypertension Father      Current Outpatient Medications   Medication Sig Dispense Refill    fish oil-omega-3 fatty acids (Fish OiL) 340-1,000 mg capsule Take 1 Capsule by mouth daily. methylPREDNISolone (MEDROL DOSEPACK) 4 mg tablet Take 1 Tablet by mouth Specific Days and Specific Times. 24 mg on day 1, then decrease by 4 mg /day x 5 days per instructions 1 Dose Pack 0    verapamil ER (CALAN-SR) 180 mg CR tablet Take 1 Tablet by mouth nightly. 90 Tablet 1    metoprolol tartrate (LOPRESSOR) 50 mg tablet Take 1 tablet by mouth twice daily 180 Tablet 0    lisinopril-hydroCHLOROthiazide (PRINZIDE, ZESTORETIC) 20-25 mg per tablet Take 1 tablet by mouth once daily 90 Tablet 1    metFORMIN (GLUCOPHAGE) 1,000 mg tablet TAKE 1 TABLET BY MOUTH TWICE DAILY WITH MEALS 180 Tablet 1    ezetimibe (ZETIA) 10 mg tablet Take 1 Tablet by mouth daily.  27 Tablet 5    glipiZIDE (GLUCOTROL) 10 mg tablet Take 1 tablet by mouth twice daily 180 Tablet 0    dulaglutide (TRULICITY SC) by SubCUTAneous route. nicotinic acid (NIACIN) 500 mg tablet Take 500 mg by mouth Daily (before breakfast). aspirin (ASPIRIN) 325 mg tablet Take 1 Tab by mouth daily. 90 Tab 12    pravastatin (PRAVACHOL) 40 mg tablet Take 1 tablet by mouth nightly (Patient not taking: Reported on 2022) 90 Tablet 1    fluticasone propionate (FLONASE) 50 mcg/actuation nasal spray 2 Sprays by Both Nostrils route daily. (Patient not taking: Reported on 2022) 1 Each 1    montelukast (SINGULAIR) 10 mg tablet Take 1 Tablet by mouth daily. (Patient not taking: Reported on 2022) 30 Tablet 1    glucose blood VI test strips (BLOOD GLUCOSE TEST) strip Test once a day for DM-2 treated with insulin. (Patient not taking: Reported on 2022) 100 Strip 1    Insulin Needles, Disposable, 31 X 5/16 \" ndle Inject hs for DM-2 250.00 (Patient not taking: Reported on 2022) 1 Package 11    Lancets misc Test TID for DM-2 treated with insulin (250.00) (Patient not taking: Reported on 2022) 1 Package 11     Allergies   Allergen Reactions    Cyclobenzaprine Other (comments)     Leg cramps    Percocet [Oxycodone-Acetaminophen] Nausea Only     Social History     Tobacco Use    Smoking status: Former     Packs/day: 2.00     Years: 30.00     Pack years: 60.00     Types: Cigarettes     Quit date: 1992     Years since quittin.4    Smokeless tobacco: Never   Substance Use Topics    Alcohol use: No    Drug use: No        Review of Systems  No fever, chills, chest pain, cough, shortness of breath.   +weakness, numbness down right leg, no incontinence    Objective:     Visit Vitals  /80 (BP 1 Location: Left upper arm, BP Patient Position: Sitting, BP Cuff Size: Large adult)   Pulse 85   Temp 98.2 °F (36.8 °C) (Oral)   Resp 22   Ht 6' 1\" (1.854 m)   Wt 300 lb (136.1 kg)   SpO2 95%   BMI 39.58 kg/m² General: Alert and oriented and in no acute distress. Responds to all questions appropriately. EYES: Sclera and conjunctiva clear. LUNGS: Clear to auscultation bilaterally, no wheeze, rales and rhonchi. CARDIOVASCULAR: Regular, rate, and rhythm without murmurs, gallops or rubs. NEUROLOGIC: Speech intact; face symmetrical; moves all extremities equally. Some decrease sensation to light touch of the right foot  MSK:   Tenderness to palpation of lower lumbar spine Negative straight leg raise. Some pain with internal and external rotation of the hip  EXTREMITIES: Well perfused. 2+ dorsalis pedis --right, moving all extremities equally. XR Results (most recent):  Results from Appointment encounter on 11/01/22    XR HIP RT W OR WO PELV 2-3 VWS    Narrative  EXAM: XR HIP RT W OR WO PELV 2-3 VWS    INDICATION: Right hip pain. COMPARISON: None. FINDINGS: AP view of the pelvis and a frogleg lateral view of the right hip  demonstrate no fracture, dislocation or other acute abnormality. Impression  No acute abnormality. EXAM: XR SPINE LUMB 2 OR 3 V     INDICATION: Lumbar pain     COMPARISON: None. TECHNIQUE: AP, lateral and spot lateral views of the lumbar spine. FINDINGS: There is normal alignment. There is marked facet arthropathy of the  lower lumbar spine. There is a moderate wedge deformity of the L4 vertebral  body, age-indeterminate. Constipation is present. IMPRESSION  Moderate L4 wedge deformity, age indeterminate. Lower lumbar facet arthropathy  Constipation     Assessment and orders:       ICD-10-CM ICD-9-CM    1. Chronic midline low back pain with right-sided sciatica  M54.41 724.2 XR SPINE LUMB 2 OR 3 V    G89.29 724.3 methylPREDNISolone (MEDROL DOSEPACK) 4 mg tablet     338.29 REFERRAL TO ORTHOPEDICS      REFERRAL TO PHYSICAL THERAPY      2. Stage 3a chronic kidney disease (HCC)  N18.31 585.3       3.  Hip pain, chronic, right  M25.551 719.45 XR HIP RT W OR WO PELV 2-3 VWS    G89.29 338.29 methylPREDNISolone (MEDROL DOSEPACK) 4 mg tablet      REFERRAL TO PHYSICAL THERAPY        Diagnoses and all orders for this visit:    1. Chronic midline low back pain with right-sided sciatica  -     XR SPINE LUMB 2 OR 3 V; Future  -     methylPREDNISolone (MEDROL DOSEPACK) 4 mg tablet; Take 1 Tablet by mouth Specific Days and Specific Times. 24 mg on day 1, then decrease by 4 mg /day x 5 days per instructions  -     REFERRAL TO ORTHOPEDICS  -     REFERRAL TO PHYSICAL THERAPY    2. Stage 3a chronic kidney disease ([facility] [address]    3. Hip pain, chronic, right  -     XR HIP RT W OR WO PELV 2-3 VWS; Future  -     methylPREDNISolone (MEDROL DOSEPACK) 4 mg tablet; Take 1 Tablet by mouth Specific Days and Specific Times. 24 mg on day 1, then decrease by 4 mg /day x 5 days per instructions  -     REFERRAL TO PHYSICAL THERAPY    Monitor fingerstick blood sugars while on steroids  reviewed medications and side effects in detail  radiology results and schedule of future radiology studies reviewed with patient      Plan:   1. Home Exercise Program as per handout. RECOMMENDATIONS given include: I Avoid heavy lifting or straining. F/U with MD if pain worsens or fails to resolve as anticipated. F/U ASAP for any new associated numbness, weakness or GI/ incontinence or fevers or chills. Spent 34 min with patient, reviewing chart and face to face exam, clinical documentation. We discussed the expected course, resolution and complications of the diagnosis(es) in detail. Medication risks, benefits, costs, interactions, and alternatives were discussed as indicated. I advised him to contact the office if his condition worsens, changes or fails to improve as anticipated. He expressed understanding with the diagnosis(es) and plan. Patient verbalized understanding.         MD DAVID Boo & LUPE WEBER [facility]  11/01/22

## 2022-11-08 ENCOUNTER — TELEPHONE (OUTPATIENT)
Dept: FAMILY MEDICINE CLINIC | Age: 67
End: 2022-11-08

## 2022-11-08 NOTE — TELEPHONE ENCOUNTER
Pt is going to his appointment with Dr. Abdirizak Rothman. They are requesting the films. Want to know if we have already sent them to Dr. Abdirizak Rothman? Please call Pt.  Thanks

## 2022-11-17 ENCOUNTER — TRANSCRIBE ORDER (OUTPATIENT)
Dept: SCHEDULING | Age: 67
End: 2022-11-17

## 2022-11-17 DIAGNOSIS — M54.16 LUMBAR RADICULITIS: Primary | ICD-10-CM

## 2022-11-22 ENCOUNTER — HOSPITAL ENCOUNTER (OUTPATIENT)
Dept: GENERAL RADIOLOGY | Age: 67
Discharge: HOME | End: 2022-11-22
Attending: ORTHOPAEDIC SURGERY
Payer: MEDICARE

## 2022-11-22 ENCOUNTER — HOSPITAL ENCOUNTER (OUTPATIENT)
Dept: MRI IMAGING | Age: 67
Discharge: HOME | End: 2022-11-22
Payer: MEDICARE

## 2022-11-22 DIAGNOSIS — M54.16 LUMBAR RADICULITIS: ICD-10-CM

## 2022-11-22 DIAGNOSIS — Z13.89 ENCOUNTER FOR IMAGING TO SCREEN FOR METAL PRIOR TO MRI: ICD-10-CM

## 2022-11-22 PROCEDURE — 72148 MRI LUMBAR SPINE W/O DYE: CPT

## 2022-11-22 PROCEDURE — 70030 X-RAY EYE FOR FOREIGN BODY: CPT

## 2022-12-05 DIAGNOSIS — E11.9 TYPE 2 DIABETES MELLITUS WITHOUT COMPLICATION, WITHOUT LONG-TERM CURRENT USE OF INSULIN (HCC): ICD-10-CM

## 2022-12-05 RX ORDER — GLIPIZIDE 10 MG/1
TABLET ORAL
Qty: 180 TABLET | Refills: 1 | Status: SHIPPED | OUTPATIENT
Start: 2022-12-05

## 2022-12-08 ENCOUNTER — VIRTUAL VISIT (OUTPATIENT)
Dept: FAMILY MEDICINE CLINIC | Age: 67
End: 2022-12-08
Payer: MEDICARE

## 2022-12-08 DIAGNOSIS — Z12.11 SCREENING FOR MALIGNANT NEOPLASM OF COLON: ICD-10-CM

## 2022-12-08 DIAGNOSIS — E78.2 MIXED HYPERLIPIDEMIA: ICD-10-CM

## 2022-12-08 DIAGNOSIS — E11.65 TYPE 2 DIABETES MELLITUS WITH HYPERGLYCEMIA, WITHOUT LONG-TERM CURRENT USE OF INSULIN (HCC): Primary | ICD-10-CM

## 2022-12-08 DIAGNOSIS — Z23 ENCOUNTER FOR IMMUNIZATION: ICD-10-CM

## 2022-12-08 DIAGNOSIS — M54.16 LUMBAR RADICULOPATHY: ICD-10-CM

## 2022-12-08 DIAGNOSIS — I10 PRIMARY HYPERTENSION: ICD-10-CM

## 2022-12-08 PROCEDURE — 1101F PT FALLS ASSESS-DOCD LE1/YR: CPT | Performed by: FAMILY MEDICINE

## 2022-12-08 PROCEDURE — 2022F DILAT RTA XM EVC RTNOPTHY: CPT | Performed by: FAMILY MEDICINE

## 2022-12-08 PROCEDURE — 99443 PR PHYS/QHP TELEPHONE EVALUATION 21-30 MIN: CPT | Performed by: FAMILY MEDICINE

## 2022-12-08 PROCEDURE — G8756 NO BP MEASURE DOC: HCPCS | Performed by: FAMILY MEDICINE

## 2022-12-08 RX ORDER — ZOSTER VACCINE RECOMBINANT, ADJUVANTED 50 MCG/0.5
0.5 KIT INTRAMUSCULAR ONCE
Qty: 0.5 ML | Refills: 0 | Status: SHIPPED | OUTPATIENT
Start: 2022-12-08 | End: 2022-12-08

## 2022-12-08 NOTE — PROGRESS NOTES
Ruy Lala  74 y.o. male  1955  [address]  [id number]    [phone] (home)      [facility]:    Telephone Encounter  Avni Marte MD       Encounter Date: 12/8/2022 at 8:02 AM    Consent:  He and/or the health care decision maker is aware that that he may receive a bill for this telephone service, depending on his insurance coverage, and has provided verbal consent to proceed: Yes    No chief complaint on file. History of Present Illness   Ruy Lala is a 74 y.o. male was evaluated by telephone. I communicated with the patient and/or health care decision maker about follow-up DM, HTN, and HLD. He reports that he is following with Ortho and Pain Management for lumbar radiculopathy. He is on oxycodone and had an injection 2 days ago. It seems to have helped a lot but has not completely gotten better.     HTN:  Checking BPs at home?: NO but has been checked multiple times recently at [address] [place] [address] and was wnl  Logs today?: NO  Range of BPs?: 110-130's/80's  Headaches?: NO  Blurry vision?: NO  Lower extremity edema?: Some on the R side  Smoking?: NO    DM:  Checking BG at home?: NO  Insulin dependent?: NO  Other medications for DM?: Glipizide 10mg BID, metformin 6164LV BID, Trulicity 0.7PF weekly  Symptoms of hypoglycemia?: Every once in a while if he skips a meal  Aspirin?: YES  ACEi/ARB?: YES  Statin?: YES  Last eye exam?: A few months ago  Last foot exam?: 6/2022  Last A1c:   Lab Results   Component Value Date/Time    Hemoglobin A1c 7.9 (H) 06/06/2022 09:02 AM    Hemoglobin A1c (POC) 7.4 04/22/2019 08:37 AM     LastLDL:   Lab Results   Component Value Date/Time    LDL, calculated 120.4 (H) 06/06/2022 09:02 AM     Last microalbumin:   Lab Results   Component Value Date/Time    Microalbumin/Creat ratio (mg/g creat) 77 (H) 06/06/2022 09:02 AM    Microalbumin,urine random 9.53 06/06/2022 09:02 AM        Review of Systems ROS    Vitals/Objective:   General: Patient speaking in complete sentences without effort. Normal speech and cooperative. Due to this being a Virtual Check-in/Telephone evaluation, many elements of the physical examination are unable to be assessed. Assessment and Plan:   Time-based coding, delete if not needed: I spent at least 15 minutes with this established patient, and >50% of the time was spent counseling and/or coordinating care regarding DM, HTN, HLD and lumbar radiculopathy  Total Time: minutes: 21-30 minutes      1. Type 2 diabetes mellitus with hyperglycemia, without long-term current use of insulin ([facility] [address]): Discussed that based on labs may discuss adding Jed Denis now that pt has insurance, for better control of DM in addition to CKD.   - HEMOGLOBIN A1C WITH EAG; Future    2. Screening for malignant neoplasm of colon: Discussed screening options and he would like to do FIT  - OCCULT BLOOD IMMUNOASSAY,DIAGNOSTIC; Future    3. Primary hypertension: Stable, continue current medications.  - METABOLIC PANEL, BASIC; Future    4. Mixed hyperlipidemia:   - LIPID PANEL; Future    5. Encounter for immunization: Pt declines Pneumococcal rx but planning to get COVID booster.   - varicella-zoster recombinant, PF, (Shingrix, PF,) 50 mcg/0.5 mL susr injection; 0.5 mL by IntraMUSCular route once for 1 dose. Dispense: 0.5 mL; Refill: 0  - diph,Pertuss,Acell,,Tet Vac-PF (ADACEL) 2 Lf-(2.5-5-3-5 mcg)-5Lf/0.5 mL susp; 0.5 mL by IntraMUSCular route once for 1 dose. Dispense: 1 Each; Refill: 0    6. Lumbar radiculopathy: Will request records from Ortho and Pain Management and continue to follow along and support however possible. Pt is not interested in PT and states it was not recommended by his specialists. RTC in 6 months for f/u chronic conditions, or sooner prn    We discussed the expected course, resolution and complications of the diagnosis(es) in detail.   Medication risks, benefits, costs, interactions, and alternatives were discussed as indicated. I advised him to contact the office if his condition worsens, changes or fails to improve as anticipated. He expressed understanding with the diagnosis(es) and plan. Patient understands that this encounter was a temporary measure, and the importance of further follow up and examination was emphasized. Patient verbalized understanding. I affirm this is a Patient Initiated Episode with an Established Patient who has not had a related appointment within my department in the past 7 days or scheduled within the next 24 hours. Note: not billable if this call serves to triage the patient into an appointment for the relevant concern      Electronically Signed: Zuly Cobb MD  Providers location when delivering service: At home        ICD-10-CM ICD-9-CM    1. Type 2 diabetes mellitus with hyperglycemia, without long-term current use of insulin (HCC)  E11.65 250.00 HEMOGLOBIN A1C WITH EAG     790.29       2. Screening for malignant neoplasm of colon  Z12.11 V76.51 OCCULT BLOOD IMMUNOASSAY,DIAGNOSTIC      3. Primary hypertension  G00 001.1 METABOLIC PANEL, BASIC      4. Mixed hyperlipidemia  E78.2 272.2 LIPID PANEL      5. Encounter for immunization  Z23 V03.89 varicella-zoster recombinant, PF, (Shingrix, PF,) 50 mcg/0.5 mL susr injection      diph,Pertuss,Acell,,Tet Vac-PF (ADACEL) 2 Lf-(2.5-5-3-5 mcg)-5Lf/0.5 mL susp      6. Lumbar radiculopathy  M54.16 724.4           Pursuant to the emergency declaration under the [facility], [facility] waiver authority and the [employer] and [employer] Act, this Virtual  Visit was conducted, with patient's consent, to reduce the patient's risk of exposure to COVID-19 and provide continuity of care for an established patient.       History     Past Medical History:   Diagnosis Date    Diabetes ([facility] [address]     Hypercholesterolemia     Hypertension Past Surgical History:   Procedure Laterality Date    HX ORTHOPAEDIC  2004    rt shoulder surgery    HX ORTHOPAEDIC      fx lft arm     Family History   Problem Relation Age of Onset    Diabetes Father     Hypertension Father      Social History     Socioeconomic History    Marital status:      Spouse name: Not on file    Number of children: Not on file    Years of education: Not on file    Highest education level: Not on file   Occupational History    Not on file   Tobacco Use    Smoking status: Former     Packs/day: 2.00     Years: 30.00     Pack years: 60.00     Types: Cigarettes     Quit date: 1992     Years since quittin.5    Smokeless tobacco: Never   Substance and Sexual Activity    Alcohol use: No    Drug use: No    Sexual activity: Yes     Partners: Female   Other Topics Concern    Not on file   Social History Narrative    Not on file     Social Determinants of Health     Financial Resource Strain: Not on file   Food Insecurity: Not on file   Transportation Needs: Not on file   Physical Activity: Not on file   Stress: Not on file   Social Connections: Not on file   Intimate Partner Violence: Not on file   Housing Stability: Not on file            Current Medications/Allergies   Medications and Allergies reviewed:    Current Outpatient Medications   Medication Sig Dispense Refill    varicella-zoster recombinant, PF, (Shingrix, PF,) 50 mcg/0.5 mL susr injection 0.5 mL by IntraMUSCular route once for 1 dose. 0.5 mL 0    diph,Pertuss,Acell,,Tet Vac-PF (ADACEL) 2 Lf-(2.5-5-3-5 mcg)-5Lf/0.5 mL susp 0.5 mL by IntraMUSCular route once for 1 dose. 1 Each 0    glipiZIDE (GLUCOTROL) 10 mg tablet Take 1 tablet by mouth twice daily 180 Tablet 1    fish oil-omega-3 fatty acids (Fish OiL) 340-1,000 mg capsule Take 1 Capsule by mouth daily. methylPREDNISolone (MEDROL DOSEPACK) 4 mg tablet Take 1 Tablet by mouth Specific Days and Specific Times.  24 mg on day 1, then decrease by 4 mg /day x 5 days per instructions 1 Dose Pack 0    verapamil ER (CALAN-SR) 180 mg CR tablet Take 1 Tablet by mouth nightly. 90 Tablet 1    metoprolol tartrate (LOPRESSOR) 50 mg tablet Take 1 tablet by mouth twice daily 180 Tablet 0    lisinopril-hydroCHLOROthiazide (PRINZIDE, ZESTORETIC) 20-25 mg per tablet Take 1 tablet by mouth once daily 90 Tablet 1    metFORMIN (GLUCOPHAGE) 1,000 mg tablet TAKE 1 TABLET BY MOUTH TWICE DAILY WITH MEALS 180 Tablet 1    ezetimibe (ZETIA) 10 mg tablet Take 1 Tablet by mouth daily. 30 Tablet 5    pravastatin (PRAVACHOL) 40 mg tablet Take 1 tablet by mouth nightly (Patient not taking: Reported on 11/1/2022) 90 Tablet 1    dulaglutide (TRULICITY SC) by SubCUTAneous route. glucose blood VI test strips (BLOOD GLUCOSE TEST) strip Test once a day for DM-2 treated with insulin. (Patient not taking: Reported on 11/1/2022) 100 Strip 1    nicotinic acid (NIACIN) 500 mg tablet Take 500 mg by mouth Daily (before breakfast). Insulin Needles, Disposable, 31 X 5/16 \" ndle Inject hs for DM-2 250.00 (Patient not taking: Reported on 11/1/2022) 1 Package 11    Lancets misc Test TID for DM-2 treated with insulin (250.00) (Patient not taking: Reported on 11/1/2022) 1 Package 11    aspirin (ASPIRIN) 325 mg tablet Take 1 Tab by mouth daily.  90 Tab 12     Allergies   Allergen Reactions    Cyclobenzaprine Other (comments)     Leg cramps    Percocet [Oxycodone-Acetaminophen] Nausea Only

## 2022-12-21 ENCOUNTER — TELEPHONE (OUTPATIENT)
Dept: FAMILY MEDICINE CLINIC | Age: 67
End: 2022-12-21

## 2022-12-23 ENCOUNTER — TELEPHONE (OUTPATIENT)
Dept: FAMILY MEDICINE CLINIC | Age: 67
End: 2022-12-23

## 2022-12-23 NOTE — TELEPHONE ENCOUNTER
----- Message from Munir Jimenez sent at 12/23/2022  2:31 PM EST -----  Subject: Message to Provider    QUESTIONS  Information for Provider? Patient was refused to be seen by referred   doctor, wants to see another doctor. ---------------------------------------------------------------------------  --------------  Jackie Perez [facility]  [phone]; OK to leave message on voicemail  ---------------------------------------------------------------------------  --------------  SCRIPT ANSWERS  Relationship to Patient?  Self

## 2022-12-26 DIAGNOSIS — I10 ESSENTIAL HYPERTENSION: ICD-10-CM

## 2022-12-27 ENCOUNTER — TELEPHONE (OUTPATIENT)
Dept: FAMILY MEDICINE CLINIC | Age: 67
End: 2022-12-27

## 2022-12-27 DIAGNOSIS — M54.16 LUMBAR RADICULOPATHY: ICD-10-CM

## 2022-12-27 DIAGNOSIS — N18.31 TYPE 2 DIABETES MELLITUS WITH STAGE 3A CHRONIC KIDNEY DISEASE, WITHOUT LONG-TERM CURRENT USE OF INSULIN (HCC): Primary | ICD-10-CM

## 2022-12-27 DIAGNOSIS — M51.36 DDD (DEGENERATIVE DISC DISEASE), LUMBAR: ICD-10-CM

## 2022-12-27 DIAGNOSIS — E11.22 TYPE 2 DIABETES MELLITUS WITH STAGE 3A CHRONIC KIDNEY DISEASE, WITHOUT LONG-TERM CURRENT USE OF INSULIN (HCC): Primary | ICD-10-CM

## 2022-12-27 RX ORDER — METOPROLOL TARTRATE 50 MG/1
TABLET ORAL
Qty: 180 TABLET | Refills: 1 | Status: SHIPPED | OUTPATIENT
Start: 2022-12-27

## 2022-12-27 NOTE — TELEPHONE ENCOUNTER
Called pt to advise of recent lab results. Labs essentially stable. A1c is 8.0. Would like to start [place] for DM as well as CKD and recheck labs in 6 months. If he is doing well with this we may be able to titrate off glipizide and decrease side effects of weight gain and hypoglycemia. Pt in agreement with plan. Rest of labs stable. Pt also notes that he was not able to be seen by his Pain Management specialist at [address] because he did not have the copay and was told he could not be seen. He does not think the injection they did was helpful and the pain is persistent. Will put in referral to new Pain Management specialist and pt to [facility] to make follow-up appt with Dr. Juventino Concepcion. All questions answered and pt feels comfortable with the plan of care.

## 2022-12-29 ENCOUNTER — TELEPHONE (OUTPATIENT)
Dept: FAMILY MEDICINE CLINIC | Age: 67
End: 2022-12-29

## 2022-12-29 NOTE — TELEPHONE ENCOUNTER
Pt states that [facility] states they need a verbal approval for the Pt's Referral to see Dr. Jackelin Freeman. Please call Pt. He thought that the Referral would be enough.

## 2022-12-30 NOTE — TELEPHONE ENCOUNTER
Abigail Jamison at Dr. Delbert Huerta office called. Stated they are reviewing patients records and will contact patient once completed. Patient called back and updated. Notified to give provider 10 business days and if no response at that time he may call office back for assistance. Verbalizes understanding and thanked for information.

## 2023-01-03 ENCOUNTER — TELEPHONE (OUTPATIENT)
Dept: FAMILY MEDICINE CLINIC | Age: 68
End: 2023-01-03

## 2023-01-03 DIAGNOSIS — E11.22 TYPE 2 DIABETES MELLITUS WITH STAGE 3A CHRONIC KIDNEY DISEASE, WITHOUT LONG-TERM CURRENT USE OF INSULIN (HCC): Primary | ICD-10-CM

## 2023-01-03 DIAGNOSIS — N18.31 TYPE 2 DIABETES MELLITUS WITH STAGE 3A CHRONIC KIDNEY DISEASE, WITHOUT LONG-TERM CURRENT USE OF INSULIN (HCC): Primary | ICD-10-CM

## 2023-01-03 NOTE — TELEPHONE ENCOUNTER
Pt's wife states the Rx for [place] is too expensive. Pt would like another Rx that is more affordable. Please advise.

## 2023-01-04 ENCOUNTER — TELEPHONE (OUTPATIENT)
Dept: FAMILY MEDICINE CLINIC | Age: 68
End: 2023-01-04

## 2023-01-04 NOTE — TELEPHONE ENCOUNTER
Informed pt's wife per Dr Shahriar Johnson  Unfortunately it looks like he is not able to afford anything in this class. We can try to get patient assistance for it. I will print the forms for him when I am back in the office tomorrow and we can either mail them to him or he can come pick them up to fill out. If they would like to bring the forms back once filled out, we can fax them.      She agrees and will  from office on Friday

## 2023-01-04 NOTE — TELEPHONE ENCOUNTER
Unfortunately it looks like he is not able to afford anything in this class. We can try to get patient assistance for it. I will print the forms for him when I am back in the office tomorrow and we can either mail them to him or he can come pick them up to fill out. If they would like to bring the forms back once filled out, we can fax them.

## 2023-01-04 NOTE — TELEPHONE ENCOUNTER
Pt's wife states they can not afford the Jardiance. Please see if there is another Rx that Pt can take.

## 2023-01-17 ENCOUNTER — TELEPHONE (OUTPATIENT)
Dept: FAMILY MEDICINE CLINIC | Age: 68
End: 2023-01-17

## 2023-01-17 NOTE — TELEPHONE ENCOUNTER
Pt states he was denied an appt b/c Dr Jones Skill is in the same network with [facility]. Pt would like to speak with the nurse.

## 2023-01-18 NOTE — TELEPHONE ENCOUNTER
Patient called, stated Dr. Leonard Duty office will not take patient due to provider is in same network. Stated he does not have money for copays as well. Requesting referral be closed at this time.

## 2023-01-20 ENCOUNTER — HOSPITAL ENCOUNTER (OUTPATIENT)
Dept: GENERAL RADIOLOGY | Age: 68
Discharge: HOME | End: 2023-01-20
Payer: MEDICARE

## 2023-01-20 ENCOUNTER — TRANSCRIBE ORDER (OUTPATIENT)
Dept: GENERAL RADIOLOGY | Age: 68
End: 2023-01-20

## 2023-01-20 ENCOUNTER — TRANSCRIBE ORDER (OUTPATIENT)
Dept: SCHEDULING | Age: 68
End: 2023-01-20

## 2023-01-20 DIAGNOSIS — M25.551 PAIN IN RIGHT HIP: Primary | ICD-10-CM

## 2023-01-20 DIAGNOSIS — M25.551 HIP PAIN, ACUTE, RIGHT: ICD-10-CM

## 2023-01-20 DIAGNOSIS — M25.551 HIP PAIN, ACUTE, RIGHT: Primary | ICD-10-CM

## 2023-01-20 PROCEDURE — 73502 X-RAY EXAM HIP UNI 2-3 VIEWS: CPT

## 2023-01-26 ENCOUNTER — HOSPITAL ENCOUNTER (OUTPATIENT)
Dept: MRI IMAGING | Age: 68
Discharge: HOME | End: 2023-01-26
Payer: MEDICARE

## 2023-01-26 DIAGNOSIS — M25.551 PAIN IN RIGHT HIP: ICD-10-CM

## 2023-01-26 PROCEDURE — 73721 MRI JNT OF LWR EXTRE W/O DYE: CPT

## 2023-02-02 DIAGNOSIS — E78.2 MIXED HYPERLIPIDEMIA: ICD-10-CM

## 2023-02-03 RX ORDER — EZETIMIBE 10 MG/1
TABLET ORAL
Qty: 30 TABLET | Refills: 5 | Status: SHIPPED | OUTPATIENT
Start: 2023-02-03

## 2023-02-23 ENCOUNTER — TELEPHONE (OUTPATIENT)
Dept: FAMILY MEDICINE CLINIC | Age: 68
End: 2023-02-23

## 2023-02-23 NOTE — TELEPHONE ENCOUNTER
Pt wants to know if he is to stop taking TRULICITY when he starts taking the [facility]?

## 2023-02-24 NOTE — TELEPHONE ENCOUNTER
Informed pt per Dr Charbel Moya  He should take both medications. They are in different classes and can be taken together.

## 2023-03-13 ENCOUNTER — TELEPHONE (OUTPATIENT)
Dept: FAMILY MEDICINE CLINIC | Age: 68
End: 2023-03-13

## 2023-03-13 ENCOUNTER — OFFICE VISIT (OUTPATIENT)
Dept: FAMILY MEDICINE CLINIC | Age: 68
End: 2023-03-13
Payer: MEDICARE

## 2023-03-13 VITALS
DIASTOLIC BLOOD PRESSURE: 70 MMHG | HEIGHT: 73 IN | RESPIRATION RATE: 20 BRPM | WEIGHT: 286 LBS | OXYGEN SATURATION: 93 % | SYSTOLIC BLOOD PRESSURE: 108 MMHG | TEMPERATURE: 98.4 F | HEART RATE: 85 BPM | BODY MASS INDEX: 37.91 KG/M2

## 2023-03-13 DIAGNOSIS — I10 PRIMARY HYPERTENSION: ICD-10-CM

## 2023-03-13 DIAGNOSIS — E11.65 TYPE 2 DIABETES MELLITUS WITH HYPERGLYCEMIA, WITHOUT LONG-TERM CURRENT USE OF INSULIN (HCC): Primary | ICD-10-CM

## 2023-03-13 DIAGNOSIS — E78.5 HYPERLIPIDEMIA, UNSPECIFIED HYPERLIPIDEMIA TYPE: ICD-10-CM

## 2023-03-13 DIAGNOSIS — N18.31 STAGE 3A CHRONIC KIDNEY DISEASE (HCC): ICD-10-CM

## 2023-03-13 DIAGNOSIS — E66.01 SEVERE OBESITY (BMI 35.0-39.9) WITH COMORBIDITY (HCC): ICD-10-CM

## 2023-03-13 PROCEDURE — G8417 CALC BMI ABV UP PARAM F/U: HCPCS | Performed by: FAMILY MEDICINE

## 2023-03-13 PROCEDURE — 3017F COLORECTAL CA SCREEN DOC REV: CPT | Performed by: FAMILY MEDICINE

## 2023-03-13 PROCEDURE — G8536 NO DOC ELDER MAL SCRN: HCPCS | Performed by: FAMILY MEDICINE

## 2023-03-13 PROCEDURE — 99214 OFFICE O/P EST MOD 30 MIN: CPT | Performed by: FAMILY MEDICINE

## 2023-03-13 PROCEDURE — G8427 DOCREV CUR MEDS BY ELIG CLIN: HCPCS | Performed by: FAMILY MEDICINE

## 2023-03-13 PROCEDURE — 3046F HEMOGLOBIN A1C LEVEL >9.0%: CPT | Performed by: FAMILY MEDICINE

## 2023-03-13 PROCEDURE — 3074F SYST BP LT 130 MM HG: CPT | Performed by: FAMILY MEDICINE

## 2023-03-13 PROCEDURE — 1101F PT FALLS ASSESS-DOCD LE1/YR: CPT | Performed by: FAMILY MEDICINE

## 2023-03-13 PROCEDURE — 1123F ACP DISCUSS/DSCN MKR DOCD: CPT | Performed by: FAMILY MEDICINE

## 2023-03-13 PROCEDURE — 2022F DILAT RTA XM EVC RTNOPTHY: CPT | Performed by: FAMILY MEDICINE

## 2023-03-13 PROCEDURE — 3078F DIAST BP <80 MM HG: CPT | Performed by: FAMILY MEDICINE

## 2023-03-13 PROCEDURE — G8432 DEP SCR NOT DOC, RNG: HCPCS | Performed by: FAMILY MEDICINE

## 2023-03-13 RX ORDER — PRAVASTATIN SODIUM 40 MG/1
TABLET ORAL
Qty: 90 TABLET | Refills: 1 | Status: SHIPPED | OUTPATIENT
Start: 2023-03-13

## 2023-03-13 NOTE — TELEPHONE ENCOUNTER
Pt is on the wait list for Dr. Kathrine Nissen for June 13th. Pt is asking for Dr. Ronald Rodgers please as his new PCP.

## 2023-03-13 NOTE — PROGRESS NOTES
CC: follow-up DM, CKD and HTN    HPI: Pt is a 74 y.o. male who presents for follow-up DM, CKD, HTN.      DM:  Checking BG at home?: NO  Insulin dependent?: NO  Other medications for DM?: Farxiga 10mg daily, metformin 1011XR BID, Trulicity 7.0SI weekly, glipizide 10mg BID  Symptoms of hypoglycemia?: NO  Aspirin?: YES  ACEi/ARB?: YES  Statin?: YES  Smoking?: NO  Last eye exam?: 2021  Last foot exam?: 2022  Last A1c:   Lab Results   Component Value Date/Time    Hemoglobin A1c 8.0 (H) 2022 08:35 AM    Hemoglobin A1c (POC) 7.4 2019 08:37 AM     LastLDL:   Lab Results   Component Value Date/Time    LDL, calculated 75.4 2022 08:35 AM     Last microalbumin:   Lab Results   Component Value Date/Time    Microalbumin/Creat ratio (mg/g creat) 77 (H) 2022 09:02 AM    Microalbumin,urine random 9.53 2022 09:02 AM         Past Medical History:   Diagnosis Date    Diabetes ([facility] [address]     Hypercholesterolemia     Hypertension        Family History   Problem Relation Age of Onset    Diabetes Father     Hypertension Father        Social History     Tobacco Use    Smoking status: Former     Packs/day: 2.00     Years: 30.00     Pack years: 60.00     Types: Cigarettes     Quit date: 1992     Years since quittin.8    Smokeless tobacco: Never   Substance Use Topics    Alcohol use: No    Drug use: No       ROS:  Per HPI    PE:  Visit Vitals  /70   Pulse 85   Temp 98.4 °F (36.9 °C)   Resp 20   Ht 6' 1\" (1.854 m)   Wt 286 lb (129.7 kg)   SpO2 93%   BMI 37.73 kg/m²     Gen: Pt sitting in chair, in NAD  Head: Normocephalic, atraumatic  Eyes: Sclera anicteric, EOM grossly intact, PERRL  Nose: Normal nasal mucosa  Throat: MMM, normal lips, tongue and gums  Neck: Supple, no LAD, no thyromegaly or carotid bruits  CVS: Normal S1, S2, no m/r/g  Resp: CTAB, no wheezes or rales  Extrem: Atraumatic, no cyanosis  Pulses: 2+   Skin: Warm, dry  Neuro: Alert, oriented, appropriate      A/P:   Encounter Diagnoses ICD-10-CM ICD-9-CM   1. Type 2 diabetes mellitus with hyperglycemia, without long-term current use of insulin (HCC)  E11.65 250.00     790.29   2. Hyperlipidemia, unspecified hyperlipidemia type  E78.5 272.4   3. Primary hypertension  I10 401.9   4. Stage 3a chronic kidney disease (HCC)  N18.31 585.3   5. Severe obesity (BMI 35.0-39. 9) with comorbidity ([facility]  E66.01 278.01     1. Hyperlipidemia, unspecified hyperlipidemia type: Stable, needs refill.   - pravastatin (PRAVACHOL) 40 mg tablet; Take 1 tablet by mouth nightly  Dispense: 90 Tablet; Refill: 1    2. Primary hypertension: Well-controlled on lisinopril-HCTZ and verapamil. Continue current doses  - METABOLIC PANEL, COMPREHENSIVE; Future    3. Type 2 diabetes mellitus with hyperglycemia, without long-term current use of insulin ([facility]): Uncontrolled as of last check but now on [address], will recheck today.   - HEMOGLOBIN A1C WITH EAG; Future    4. Stage 3a chronic kidney disease ([facility] [address]): Now on Farxiga  - METABOLIC PANEL, COMPREHENSIVE; Future    5. Severe obesity (BMI 35.0-39. 9) with comorbidity ([facility]       RTC in 3 months for f/u chronic conditions, or sooner prn    Discussed diagnoses in detail with patient. Medication risks/benefits/side effects discussed with patient. All of the patient's questions were addressed. The patient understands and agrees with our plan of care. The patient knows to call back if they are unsure of or forget any changes we discussed today or if the symptoms change. The patient received an After-Visit Summary which contains VS, orders, medication list and allergy list. This can be used as a \"mini-medical record\" should they have to seek medical care while out of town. Current Outpatient Medications on File Prior to Visit   Medication Sig Dispense Refill    dapagliflozin (Farxiga) 10 mg tab tablet Take  by mouth daily.       metFORMIN (GLUCOPHAGE) 1,000 mg tablet TAKE 1 TABLET BY MOUTH TWICE DAILY WITH MEALS 180 Tablet 0 ezetimibe (ZETIA) 10 mg tablet Take 1 tablet by mouth once daily 30 Tablet 5    metoprolol tartrate (LOPRESSOR) 50 mg tablet Take 1 tablet by mouth twice daily 180 Tablet 1    glipiZIDE (GLUCOTROL) 10 mg tablet Take 1 tablet by mouth twice daily 180 Tablet 1    fish oil-omega-3 fatty acids 340-1,000 mg capsule Take 1 Capsule by mouth daily. verapamil ER (CALAN-SR) 180 mg CR tablet Take 1 Tablet by mouth nightly. 90 Tablet 1    lisinopril-hydroCHLOROthiazide (PRINZIDE, ZESTORETIC) 20-25 mg per tablet Take 1 tablet by mouth once daily 90 Tablet 1    dulaglutide (TRULICITY SC) by SubCUTAneous route. aspirin (ASPIRIN) 325 mg tablet Take 1 Tab by mouth daily. 90 Tab 12    methylPREDNISolone (MEDROL DOSEPACK) 4 mg tablet Take 1 Tablet by mouth Specific Days and Specific Times. 24 mg on day 1, then decrease by 4 mg /day x 5 days per instructions (Patient not taking: Reported on 3/13/2023) 1 Dose Pack 0    glucose blood VI test strips (BLOOD GLUCOSE TEST) strip Test once a day for DM-2 treated with insulin. (Patient not taking: No sig reported) 100 Strip 1    nicotinic acid (NIACIN) 500 mg tablet Take 500 mg by mouth Daily (before breakfast). Insulin Needles, Disposable, 31 X 5/16 \" ndle Inject hs for DM-2 250.00 (Patient not taking: No sig reported) 1 Package 11    Lancets misc Test TID for DM-2 treated with insulin (250.00) (Patient not taking: No sig reported) 1 Package 11     No current facility-administered medications on file prior to visit.

## 2023-03-13 NOTE — PROGRESS NOTES
1. \"Have you been to the ER, urgent care clinic since your last visit? Hospitalized since your last visit? \" No    2. \"Have you seen or consulted any other health care providers outside of the [address] [place] [address] since your last visit? \" No     3. For patients aged 39-70: Has the patient had a colonoscopy / FIT/ Cologuard? No      If the patient is female:    4. For patients aged 41-77: Has the patient had a mammogram within the past 2 years? NA - based on age or sex      11. For patients aged 21-65: Has the patient had a pap smear?  NA - based on age or sex    Health Maintenance Due   Topic Date Due    Pneumococcal 65+ years (1 - PCV) Never done    Shingles Vaccine (1 of 2) Never done    DTaP/Tdap/Td series (1 - Tdap) 01/22/2011    Colorectal Cancer Screening Combo  06/25/2019    COVID-19 Vaccine (3 - Booster for Pfizer series) 05/17/2021    AAA Screening 73-69 YO Male Smoking Patients  Never done    Medicare Yearly Exam  Never done

## 2023-03-14 LAB
ALBUMIN SERPL-MCNC: 3.8 G/DL (ref 3.5–5)
ALBUMIN/GLOB SERPL: 1.4 (ref 1.1–2.2)
ALP SERPL-CCNC: 51 U/L (ref 45–117)
ALT SERPL-CCNC: 70 U/L (ref 12–78)
ANION GAP SERPL CALC-SCNC: 10 MMOL/L (ref 5–15)
AST SERPL-CCNC: 32 U/L (ref 15–37)
BILIRUB SERPL-MCNC: 0.3 MG/DL (ref 0.2–1)
BUN SERPL-MCNC: 44 MG/DL (ref 6–20)
BUN/CREAT SERPL: 27 (ref 12–20)
CALCIUM SERPL-MCNC: 9.7 MG/DL (ref 8.5–10.1)
CHLORIDE SERPL-SCNC: 108 MMOL/L (ref 97–108)
CO2 SERPL-SCNC: 22 MMOL/L (ref 21–32)
CREAT SERPL-MCNC: 1.63 MG/DL (ref 0.7–1.3)
EST. AVERAGE GLUCOSE BLD GHB EST-MCNC: 177 MG/DL
GLOBULIN SER CALC-MCNC: 2.7 G/DL (ref 2–4)
GLUCOSE SERPL-MCNC: 178 MG/DL (ref 65–100)
HBA1C MFR BLD: 7.8 % (ref 4–5.6)
POTASSIUM SERPL-SCNC: 4.6 MMOL/L (ref 3.5–5.1)
PROT SERPL-MCNC: 6.5 G/DL (ref 6.4–8.2)
SODIUM SERPL-SCNC: 140 MMOL/L (ref 136–145)

## 2023-03-23 NOTE — TELEPHONE ENCOUNTER
Pt reports not able to tolerate zetia  Requesting an alternative  Please advise Keystone Flap Text: The defect edges were debeveled with a #15 scalpel blade.  Given the location of the defect, shape of the defect a keystone flap was deemed most appropriate.  Using a sterile surgical marker, an appropriate keystone flap was drawn incorporating the defect, outlining the appropriate donor tissue and placing the expected incisions within the relaxed skin tension lines where possible. The area thus outlined was incised deep to adipose tissue with a #15 scalpel blade.  The skin margins were undermined to an appropriate distance in all directions around the primary defect and laterally outward around the flap utilizing iris scissors.

## 2023-03-24 DIAGNOSIS — I10 ESSENTIAL HYPERTENSION: ICD-10-CM

## 2023-03-24 RX ORDER — VERAPAMIL HYDROCHLORIDE 180 MG/1
180 TABLET, EXTENDED RELEASE ORAL
Qty: 90 TABLET | Refills: 0 | Status: SHIPPED | OUTPATIENT
Start: 2023-03-24

## 2023-04-05 RX ORDER — LISINOPRIL AND HYDROCHLOROTHIAZIDE 20; 25 MG/1; MG/1
TABLET ORAL
Qty: 90 TABLET | Refills: 0 | Status: SHIPPED
Start: 2023-04-05

## 2023-04-18 ENCOUNTER — TELEPHONE (OUTPATIENT)
Dept: FAMILY MEDICINE CLINIC | Age: 68
End: 2023-04-18

## 2023-04-18 NOTE — TELEPHONE ENCOUNTER
Pt stated that he received the jardiance but not the Trulicity. I told patient I would check on it on my end. I also gave patient the phone number to call to check on the status as well. Pt states his wife dropped off his [employer] paperwork last week. Pt will be out of medication by next week. Please advise.

## 2023-04-18 NOTE — TELEPHONE ENCOUNTER
Pt states his wife dropped off his [employer] paperwork last week. Pt will be out of medication by next week. Please advise.

## 2023-04-20 ENCOUNTER — TELEPHONE (OUTPATIENT)
Dept: FAMILY MEDICINE CLINIC | Age: 68
End: 2023-04-20

## 2023-04-28 ENCOUNTER — TELEPHONE (OUTPATIENT)
Dept: FAMILY MEDICINE CLINIC | Age: 68
End: 2023-04-28

## 2023-04-28 DIAGNOSIS — E11.65 TYPE 2 DIABETES MELLITUS WITH HYPERGLYCEMIA, WITHOUT LONG-TERM CURRENT USE OF INSULIN (HCC): ICD-10-CM

## 2023-04-28 RX ORDER — DULAGLUTIDE 4.5 MG/.5ML
4.5 INJECTION, SOLUTION SUBCUTANEOUS
Qty: 1 EACH | Refills: 0 | Status: SHIPPED | OUTPATIENT
Start: 2023-04-28

## 2023-04-28 NOTE — TELEPHONE ENCOUNTER
Pt states he has no Trulicity. Wants to know if he can get at least one order to hold him until his come in the mail. Not sure if he will be able to afford or not. But the Company we have for him says it will be about 7 days before they ship out.  Thanks

## 2023-05-08 ENCOUNTER — OFFICE VISIT (OUTPATIENT)
Facility: CLINIC | Age: 68
End: 2023-05-08
Payer: MEDICARE

## 2023-05-08 VITALS
DIASTOLIC BLOOD PRESSURE: 65 MMHG | WEIGHT: 284.4 LBS | RESPIRATION RATE: 18 BRPM | HEART RATE: 88 BPM | OXYGEN SATURATION: 94 % | HEIGHT: 73 IN | TEMPERATURE: 96.8 F | BODY MASS INDEX: 37.69 KG/M2 | SYSTOLIC BLOOD PRESSURE: 121 MMHG

## 2023-05-08 DIAGNOSIS — Z12.11 SCREENING FOR COLON CANCER: ICD-10-CM

## 2023-05-08 DIAGNOSIS — M25.551 RIGHT HIP PAIN: ICD-10-CM

## 2023-05-08 DIAGNOSIS — Z00.00 MEDICARE ANNUAL WELLNESS VISIT, INITIAL: Primary | ICD-10-CM

## 2023-05-08 PROCEDURE — 3017F COLORECTAL CA SCREEN DOC REV: CPT | Performed by: FAMILY MEDICINE

## 2023-05-08 PROCEDURE — 3078F DIAST BP <80 MM HG: CPT | Performed by: FAMILY MEDICINE

## 2023-05-08 PROCEDURE — G0438 PPPS, INITIAL VISIT: HCPCS | Performed by: FAMILY MEDICINE

## 2023-05-08 PROCEDURE — 3074F SYST BP LT 130 MM HG: CPT | Performed by: FAMILY MEDICINE

## 2023-05-08 PROCEDURE — 1123F ACP DISCUSS/DSCN MKR DOCD: CPT | Performed by: FAMILY MEDICINE

## 2023-05-08 RX ORDER — DIAZEPAM 5 MG/1
TABLET ORAL
COMMUNITY
Start: 2023-03-15

## 2023-05-08 RX ORDER — ZOSTER VACCINE RECOMBINANT, ADJUVANTED 50 MCG/0.5
0.5 KIT INTRAMUSCULAR SEE ADMIN INSTRUCTIONS
Qty: 0.5 ML | Refills: 0 | Status: SHIPPED | OUTPATIENT
Start: 2023-05-08 | End: 2023-11-04

## 2023-05-08 RX ORDER — CHLORAL HYDRATE 500 MG
1 CAPSULE ORAL DAILY
COMMUNITY

## 2023-05-08 RX ORDER — GABAPENTIN 300 MG/1
300 CAPSULE ORAL 2 TIMES DAILY
Qty: 60 CAPSULE | Refills: 0 | Status: SHIPPED | OUTPATIENT
Start: 2023-05-08 | End: 2023-06-07

## 2023-05-08 RX ORDER — DULAGLUTIDE 4.5 MG/.5ML
INJECTION, SOLUTION SUBCUTANEOUS
COMMUNITY
Start: 2023-04-28

## 2023-05-08 RX ORDER — MELOXICAM 15 MG/1
15 TABLET ORAL DAILY
COMMUNITY
Start: 2023-03-29 | End: 2023-05-08 | Stop reason: ALTCHOICE

## 2023-05-08 RX ORDER — PNEUMOCOCCAL 20-VALENT CONJUGATE VACCINE 2.2; 2.2; 2.2; 2.2; 2.2; 2.2; 2.2; 2.2; 2.2; 2.2; 2.2; 2.2; 2.2; 2.2; 2.2; 2.2; 4.4; 2.2; 2.2; 2.2 UG/.5ML; UG/.5ML; UG/.5ML; UG/.5ML; UG/.5ML; UG/.5ML; UG/.5ML; UG/.5ML; UG/.5ML; UG/.5ML; UG/.5ML; UG/.5ML; UG/.5ML; UG/.5ML; UG/.5ML; UG/.5ML; UG/.5ML; UG/.5ML; UG/.5ML; UG/.5ML
0.5 INJECTION, SUSPENSION INTRAMUSCULAR ONCE
Qty: 1 EACH | Refills: 0 | Status: SHIPPED | OUTPATIENT
Start: 2023-05-08 | End: 2023-05-08

## 2023-05-08 ASSESSMENT — PATIENT HEALTH QUESTIONNAIRE - PHQ9
SUM OF ALL RESPONSES TO PHQ QUESTIONS 1-9: 0
1. LITTLE INTEREST OR PLEASURE IN DOING THINGS: 0
SUM OF ALL RESPONSES TO PHQ QUESTIONS 1-9: 0
SUM OF ALL RESPONSES TO PHQ QUESTIONS 1-9: 0
2. FEELING DOWN, DEPRESSED OR HOPELESS: 0
SUM OF ALL RESPONSES TO PHQ9 QUESTIONS 1 & 2: 0
SUM OF ALL RESPONSES TO PHQ QUESTIONS 1-9: 0

## 2023-05-08 ASSESSMENT — LIFESTYLE VARIABLES
HOW OFTEN DO YOU HAVE A DRINK CONTAINING ALCOHOL: NEVER
HOW MANY STANDARD DRINKS CONTAINING ALCOHOL DO YOU HAVE ON A TYPICAL DAY: PATIENT DOES NOT DRINK
HOW OFTEN DO YOU HAVE A DRINK CONTAINING ALCOHOL: NEVER

## 2023-05-08 NOTE — PROGRESS NOTES
1. \"Have you been to the ER, urgent care clinic since your last visit? Hospitalized since your last visit? \" No    2. \"Have you seen or consulted any other health care providers outside of the [address] [place] [address] since your last visit? \"      3. For patients aged 39-70: Has the patient had a colonoscopy / FIT/ Cologuard? No      If the patient is female:N/A    4. For patients aged 41-77: Has the patient had a mammogram within the past 2 years? N/a      5. For patients aged 21-65: Has the patient had a pap smear?  N/A      Health Maintenance Due   Topic Date Due    Pneumococcal 65+ years Vaccine (1 - PCV) Never done    HIV screen  Never done    Shingles vaccine (1 of 2) Never done    DTaP/Tdap/Td vaccine (1 - Tdap) 01/22/2011    Colorectal Cancer Screen  12/26/2017    COVID-19 Vaccine (3 - Booster for Pfizer series) 05/17/2021    Diabetic retinal exam  04/01/2022    AAA screen  Never done    Diabetic foot exam  06/06/2023    Diabetic Alb to Cr ratio (uACR) test  06/06/2023    Depression Screen  06/06/2023
days Max Daily Amount: 600 mg Yes Vish Che MD   pneumococcal 20-valent conjugat (PREVNAR 20) 0.5 ML ERNESTO inj Inject 0.5 mLs into the muscle once for 1 dose Yes Vish Che MD   aspirin 325 MG tablet Take 1 tablet by mouth daily Yes Ar Automatic Reconciliation   dapagliflozin (FARXIGA) 10 MG tablet Take by mouth daily Yes Ar Automatic Reconciliation   ezetimibe (ZETIA) 10 MG tablet Take 1 tablet by mouth daily Yes Ar Automatic Reconciliation   glipiZIDE (GLUCOTROL) 10 MG tablet Take 1 tablet by mouth 2 times daily Yes Ar Automatic Reconciliation   lisinopril-hydroCHLOROthiazide (PRINZIDE;ZESTORETIC) 20-25 MG per tablet Take 1 tablet by mouth daily Yes Ar Automatic Reconciliation   metFORMIN (GLUCOPHAGE) 1000 MG tablet Take 1 tablet by mouth 2 times daily (with meals) Yes Ar Automatic Reconciliation   metoprolol tartrate (LOPRESSOR) 50 MG tablet Take 1 tablet by mouth 2 times daily Yes Ar Automatic Reconciliation   niacin 500 MG tablet Take 1 tablet by mouth every morning (before breakfast) Yes Ar Automatic Reconciliation   pravastatin (PRAVACHOL) 40 MG tablet Take 1 tablet by mouth nightly Yes Ar Automatic Reconciliation   verapamil (CALAN SR) 180 MG extended release tablet Take 1 tablet by mouth nightly Yes Ar Automatic Reconciliation       CareTeam (Including outside providers/suppliers regularly involved in providing care):   Patient Care Team:  Vish Che MD as PCP - Maribell Moreno MD as PCP - Empaneled Provider  Shmuel Sheth MD as Consulting Physician     Reviewed and updated this visit:  Tobacco  Allergies  Meds  Problems  Med Hx  Surg Hx  Soc Hx  Fam Hx

## 2023-05-08 NOTE — PATIENT INSTRUCTIONS
diabetes, high blood pressure, and high cholesterol. If you think you may have a problem with alcohol or drug use, talk to your doctor. Medicines    Take your medicines exactly as prescribed. Call your doctor if you think you are having a problem with your medicine.     If your doctor recommends aspirin, take the amount directed each day. Make sure you take aspirin and not another kind of pain reliever, such as acetaminophen (Tylenol). When should you call for help? Call 911 if you have symptoms of a heart attack. These may include:    Chest pain or pressure, or a strange feeling in the chest.     Sweating.     Shortness of breath.     Pain, pressure, or a strange feeling in the back, neck, jaw, or upper belly or in one or both shoulders or arms.     Lightheadedness or sudden weakness.     A fast or irregular heartbeat. After you call 911, the  may tell you to chew 1 adult-strength or 2 to 4 low-dose aspirin. Wait for an ambulance. Do not try to drive yourself. Watch closely for changes in your health, and be sure to contact your doctor if you have any problems. Where can you learn more? Go to http://www.perez.com/ and enter F075 to learn more about \"A Healthy Heart: Care Instructions. \"  Current as of: September 7, 2022               Content Version: 13.6  © 2006-2023 Healthwise, [employer]. Care instructions adapted under license by [facility] [place] ([facility]). If you have questions about a medical condition or this instruction, always ask your healthcare professional. [place] [address] any warranty or liability for your use of this information. Personalized Preventive Plan for Hugh Banuelos - 5/8/2023  Medicare offers a range of preventive health benefits. Some of the tests and screenings are paid in full while other may be subject to a deductible, co-insurance, and/or copay.     Some of these benefits include a comprehensive review of your medical history including

## 2023-05-16 ENCOUNTER — TELEPHONE (OUTPATIENT)
Facility: CLINIC | Age: 68
End: 2023-05-16

## 2023-05-16 NOTE — TELEPHONE ENCOUNTER
Returned call to Inell Pod (Wife on [employer]). Discussed last OV and plan. No further questions at this time.

## 2023-05-16 NOTE — TELEPHONE ENCOUNTER
Wife Flower Pinedo has called to ask if patient still needs to go see urology? ? They thought they discussed that at their last visit.  Please advise

## 2023-05-30 RX ORDER — GLIPIZIDE 10 MG/1
TABLET ORAL
Qty: 180 TABLET | Refills: 0 | Status: SHIPPED | OUTPATIENT
Start: 2023-05-30

## 2023-06-13 PROBLEM — M54.16 RADICULOPATHY, LUMBAR REGION: Status: ACTIVE | Noted: 2023-06-13

## 2023-06-13 PROBLEM — Z79.899 CONTROLLED SUBSTANCE AGREEMENT SIGNED: Status: ACTIVE | Noted: 2023-06-13

## 2023-06-13 PROBLEM — M25.551 RIGHT HIP PAIN: Status: ACTIVE | Noted: 2023-06-13

## 2023-07-06 ENCOUNTER — HOSPITAL ENCOUNTER (OUTPATIENT)
Facility: HOSPITAL | Age: 68
Discharge: HOME | End: 2023-07-06
Attending: FAMILY MEDICINE
Payer: MEDICARE

## 2023-07-06 DIAGNOSIS — Z13.6 SCREENING FOR AAA (ABDOMINAL AORTIC ANEURYSM): ICD-10-CM

## 2023-07-06 PROCEDURE — 76706 US ABDL AORTA SCREEN AAA: CPT

## 2023-07-15 LAB — HEMOCCULT STL QL IA: NEGATIVE

## 2023-07-24 RX ORDER — EZETIMIBE 10 MG/1
10 TABLET ORAL DAILY
Qty: 90 TABLET | Refills: 1 | Status: SHIPPED | OUTPATIENT
Start: 2023-07-24

## 2023-09-01 RX ORDER — PRAVASTATIN SODIUM 40 MG
40 TABLET ORAL NIGHTLY
Qty: 90 TABLET | Refills: 1 | Status: SHIPPED | OUTPATIENT
Start: 2023-09-01

## 2023-09-11 ENCOUNTER — APPOINTMENT (OUTPATIENT)
Facility: HOSPITAL | Age: 68
End: 2023-09-11
Payer: MEDICARE

## 2023-09-11 ENCOUNTER — HOSPITAL ENCOUNTER (EMERGENCY)
Facility: HOSPITAL | Age: 68
Discharge: HOME | End: 2023-09-11
Attending: FAMILY MEDICINE
Payer: MEDICARE

## 2023-09-11 VITALS
BODY MASS INDEX: 38.43 KG/M2 | HEART RATE: 83 BPM | TEMPERATURE: 97.7 F | SYSTOLIC BLOOD PRESSURE: 132 MMHG | OXYGEN SATURATION: 99 % | DIASTOLIC BLOOD PRESSURE: 83 MMHG | WEIGHT: 290 LBS | RESPIRATION RATE: 18 BRPM | HEIGHT: 73 IN

## 2023-09-11 DIAGNOSIS — M51.36 DEGENERATIVE DISC DISEASE, LUMBAR: Primary | ICD-10-CM

## 2023-09-11 DIAGNOSIS — S30.0XXA LUMBAR CONTUSION, INITIAL ENCOUNTER: ICD-10-CM

## 2023-09-11 PROCEDURE — 6360000002 HC RX W HCPCS: Performed by: FAMILY MEDICINE

## 2023-09-11 PROCEDURE — 72131 CT LUMBAR SPINE W/O DYE: CPT

## 2023-09-11 PROCEDURE — 99284 EMERGENCY DEPT VISIT MOD MDM: CPT

## 2023-09-11 PROCEDURE — 96372 THER/PROPH/DIAG INJ SC/IM: CPT

## 2023-09-11 RX ORDER — LIDOCAINE 4 G/G
PATCH TOPICAL
Qty: 5 EACH | Refills: 0 | Status: SHIPPED | OUTPATIENT
Start: 2023-09-11

## 2023-09-11 RX ORDER — KETOROLAC TROMETHAMINE 15 MG/ML
15 INJECTION, SOLUTION INTRAMUSCULAR; INTRAVENOUS
Status: COMPLETED | OUTPATIENT
Start: 2023-09-11 | End: 2023-09-11

## 2023-09-11 RX ORDER — HYDROCODONE BITARTRATE AND ACETAMINOPHEN 5; 325 MG/1; MG/1
1 TABLET ORAL EVERY 8 HOURS PRN
Qty: 7 TABLET | Refills: 0 | Status: SHIPPED | OUTPATIENT
Start: 2023-09-11 | End: 2023-09-14

## 2023-09-11 RX ADMIN — KETOROLAC TROMETHAMINE 15 MG: 15 INJECTION, SOLUTION INTRAMUSCULAR; INTRAVENOUS at 16:38

## 2023-09-11 ASSESSMENT — LIFESTYLE VARIABLES
HOW OFTEN DO YOU HAVE A DRINK CONTAINING ALCOHOL: NEVER
HOW MANY STANDARD DRINKS CONTAINING ALCOHOL DO YOU HAVE ON A TYPICAL DAY: PATIENT DOES NOT DRINK

## 2023-09-11 ASSESSMENT — PAIN SCALES - GENERAL: PAINLEVEL_OUTOF10: 10

## 2023-09-11 ASSESSMENT — PAIN DESCRIPTION - LOCATION: LOCATION: HIP

## 2023-09-11 ASSESSMENT — PAIN - FUNCTIONAL ASSESSMENT: PAIN_FUNCTIONAL_ASSESSMENT: 0-10

## 2023-09-11 NOTE — ED TRIAGE NOTES
Patient reports that he was getting out of a tractor trailer around 11am & he slipped on the step & fell onto his left side.

## 2023-09-11 NOTE — DISCHARGE INSTRUCTIONS
Thank you! Thank you for allowing me to care for you in the emergency department. It is my goal to provide you with excellent care. If you have not received excellent quality care, please ask to speak to the nurse manager. Please fill out the survey that will come to you by mail or email since we listen to your feedback! Below you will find a list of your tests from today's visit. Should you have any questions, please do not hesitate to call the emergency department. Labs  No results found for this or any previous visit (from the past 12 hour(s)). Radiologic Studies  CT LUMBAR SPINE WO CONTRAST   Final Result      Multifactorial central canal stenosis, severe at L3-4, moderate L4-5 and mild at   L1 to   Severe left L3-4, bilateral L4-5, and bilateral L5-S1 neural foraminal stenosis   No acute fracture           ------------------------------------------------------------------------------------------------------------  The exam and treatment you received in the Emergency Department were for an urgent problem and are not intended as complete care. It is important that you follow-up with a doctor, nurse practitioner, or physician assistant to:  (1) confirm your diagnosis,  (2) re-evaluation of changes in your illness and treatment, and  (3) for ongoing care. Please take your discharge instructions with you when you go to your follow-up appointment. If you have any problem arranging a follow-up appointment, contact the Emergency Department. If your symptoms become worse or you do not improve as expected and you are unable to reach your health care provider, please return to the Emergency Department. We are available 24 hours a day. If a prescription has been provided, please have it filled as soon as possible to prevent a delay in treatment.  If you have any questions or reservations about taking the medication due to side effects or interactions with other medications, please call your primary

## 2023-09-11 NOTE — ED PROVIDER NOTES
EMERGENCY DEPARTMENT HISTORY AND PHYSICAL EXAM      Date: 9/11/2023  Patient Name: Federico Kaba    History of Presenting Illness     Chief Complaint   Patient presents with    Fall       History Provided By:     HPI: Federico Kaba, is a very pleasant 79 y.o. male presenting to the ED with a chief complaint of fall. Earlier today he was sitting in his tractor when he slipped out of his seat and hit his left lower back on the side of the door and then fell to the ground. He was ambulatory immediately afterwards. Did not hit his head. No neck pain. Denies midline back pain. No numbness tingling or weakness in extremities. No sensory changes in lower extremities. No difficulty urinating or stooling. Denies any other symptoms at this time. PCP: Arlie Bence, MD    No current facility-administered medications on file prior to encounter. Current Outpatient Medications on File Prior to Encounter   Medication Sig Dispense Refill    pravastatin (PRAVACHOL) 40 MG tablet Take 1 tablet by mouth nightly 90 tablet 1    dapagliflozin (FARXIGA) 10 MG tablet Take 1 tablet by mouth every morning 30 tablet 0    ezetimibe (ZETIA) 10 MG tablet Take 1 tablet by mouth daily 90 tablet 1    gabapentin (NEURONTIN) 300 MG capsule Take 1 capsule by mouth 2 times daily for 30 days.  Intended supply: 30 days Max Daily Amount: 600 mg 60 capsule 0    glipiZIDE (GLUCOTROL) 10 MG tablet Take 1 tablet by mouth 2 times daily (Patient taking differently: Take 1 tablet by mouth daily) 180 tablet 1    metoprolol tartrate (LOPRESSOR) 50 MG tablet Take 1 tablet by mouth 2 times daily 180 tablet 1    verapamil (CALAN SR) 180 MG extended release tablet Take 1 tablet by mouth nightly 90 tablet 1    lisinopril-hydroCHLOROthiazide (PRINZIDE;ZESTORETIC) 20-25 MG per tablet Take 1 tablet by mouth daily 90 tablet 1    metFORMIN (GLUCOPHAGE) 1000 MG tablet Take 1 tablet by mouth 2 times daily (with meals) 510 tablet 1    TRULICITY 4.5

## 2023-09-13 ENCOUNTER — OFFICE VISIT (OUTPATIENT)
Facility: CLINIC | Age: 68
End: 2023-09-13
Payer: MEDICARE

## 2023-09-13 VITALS
WEIGHT: 291 LBS | HEART RATE: 81 BPM | SYSTOLIC BLOOD PRESSURE: 106 MMHG | BODY MASS INDEX: 38.57 KG/M2 | RESPIRATION RATE: 18 BRPM | OXYGEN SATURATION: 94 % | TEMPERATURE: 97.5 F | HEIGHT: 73 IN | DIASTOLIC BLOOD PRESSURE: 68 MMHG

## 2023-09-13 DIAGNOSIS — I10 PRIMARY HYPERTENSION: ICD-10-CM

## 2023-09-13 DIAGNOSIS — M54.16 RADICULOPATHY, LUMBAR REGION: ICD-10-CM

## 2023-09-13 DIAGNOSIS — Z91.148 CONTROLLED SUBSTANCE AGREEMENT BROKEN: ICD-10-CM

## 2023-09-13 DIAGNOSIS — N18.31 STAGE 3A CHRONIC KIDNEY DISEASE (HCC): ICD-10-CM

## 2023-09-13 DIAGNOSIS — E11.22 TYPE 2 DIABETES MELLITUS WITH STAGE 3A CHRONIC KIDNEY DISEASE, WITHOUT LONG-TERM CURRENT USE OF INSULIN (HCC): Primary | ICD-10-CM

## 2023-09-13 DIAGNOSIS — N18.31 TYPE 2 DIABETES MELLITUS WITH STAGE 3A CHRONIC KIDNEY DISEASE, WITHOUT LONG-TERM CURRENT USE OF INSULIN (HCC): Primary | ICD-10-CM

## 2023-09-13 PROBLEM — Z79.899 CONTROLLED SUBSTANCE AGREEMENT SIGNED: Status: RESOLVED | Noted: 2023-06-13 | Resolved: 2023-09-13

## 2023-09-13 LAB — HBA1C MFR BLD: 7.7 %

## 2023-09-13 PROCEDURE — 3074F SYST BP LT 130 MM HG: CPT | Performed by: FAMILY MEDICINE

## 2023-09-13 PROCEDURE — G8417 CALC BMI ABV UP PARAM F/U: HCPCS | Performed by: FAMILY MEDICINE

## 2023-09-13 PROCEDURE — 1123F ACP DISCUSS/DSCN MKR DOCD: CPT | Performed by: FAMILY MEDICINE

## 2023-09-13 PROCEDURE — 3051F HG A1C>EQUAL 7.0%<8.0%: CPT | Performed by: FAMILY MEDICINE

## 2023-09-13 PROCEDURE — 3017F COLORECTAL CA SCREEN DOC REV: CPT | Performed by: FAMILY MEDICINE

## 2023-09-13 PROCEDURE — 3078F DIAST BP <80 MM HG: CPT | Performed by: FAMILY MEDICINE

## 2023-09-13 PROCEDURE — 99214 OFFICE O/P EST MOD 30 MIN: CPT | Performed by: FAMILY MEDICINE

## 2023-09-13 PROCEDURE — G8427 DOCREV CUR MEDS BY ELIG CLIN: HCPCS | Performed by: FAMILY MEDICINE

## 2023-09-13 PROCEDURE — 1036F TOBACCO NON-USER: CPT | Performed by: FAMILY MEDICINE

## 2023-09-13 PROCEDURE — 2022F DILAT RTA XM EVC RTNOPTHY: CPT | Performed by: FAMILY MEDICINE

## 2023-09-13 PROCEDURE — 83036 HEMOGLOBIN GLYCOSYLATED A1C: CPT | Performed by: FAMILY MEDICINE

## 2023-09-13 RX ORDER — PNEUMOCOCCAL 20-VALENT CONJUGATE VACCINE 2.2; 2.2; 2.2; 2.2; 2.2; 2.2; 2.2; 2.2; 2.2; 2.2; 2.2; 2.2; 2.2; 2.2; 2.2; 2.2; 4.4; 2.2; 2.2; 2.2 UG/.5ML; UG/.5ML; UG/.5ML; UG/.5ML; UG/.5ML; UG/.5ML; UG/.5ML; UG/.5ML; UG/.5ML; UG/.5ML; UG/.5ML; UG/.5ML; UG/.5ML; UG/.5ML; UG/.5ML; UG/.5ML; UG/.5ML; UG/.5ML; UG/.5ML; UG/.5ML
0.5 INJECTION, SUSPENSION INTRAMUSCULAR ONCE
Qty: 1 EACH | Refills: 0 | Status: SHIPPED | OUTPATIENT
Start: 2023-09-13 | End: 2023-09-13

## 2023-09-13 RX ORDER — DULAGLUTIDE 4.5 MG/.5ML
INJECTION, SOLUTION SUBCUTANEOUS
Qty: 5 ADJUSTABLE DOSE PRE-FILLED PEN SYRINGE | Refills: 1 | Status: SHIPPED | OUTPATIENT
Start: 2023-09-13

## 2023-09-13 RX ORDER — ZOSTER VACCINE RECOMBINANT, ADJUVANTED 50 MCG/0.5
0.5 KIT INTRAMUSCULAR SEE ADMIN INSTRUCTIONS
Qty: 0.5 ML | Refills: 1 | Status: SHIPPED | OUTPATIENT
Start: 2023-09-13 | End: 2024-03-11

## 2023-09-13 ASSESSMENT — ENCOUNTER SYMPTOMS
SHORTNESS OF BREATH: 0
COUGH: 0
BACK PAIN: 1

## 2023-10-03 DIAGNOSIS — E11.22 TYPE 2 DIABETES MELLITUS WITH STAGE 3A CHRONIC KIDNEY DISEASE, WITHOUT LONG-TERM CURRENT USE OF INSULIN (HCC): ICD-10-CM

## 2023-10-03 DIAGNOSIS — N18.31 TYPE 2 DIABETES MELLITUS WITH STAGE 3A CHRONIC KIDNEY DISEASE, WITHOUT LONG-TERM CURRENT USE OF INSULIN (HCC): ICD-10-CM

## 2023-12-05 ENCOUNTER — OFFICE VISIT (OUTPATIENT)
Facility: CLINIC | Age: 68
End: 2023-12-05

## 2023-12-05 VITALS
WEIGHT: 295 LBS | HEIGHT: 73 IN | SYSTOLIC BLOOD PRESSURE: 134 MMHG | OXYGEN SATURATION: 94 % | DIASTOLIC BLOOD PRESSURE: 79 MMHG | BODY MASS INDEX: 39.1 KG/M2 | RESPIRATION RATE: 20 BRPM | TEMPERATURE: 98.3 F | HEART RATE: 87 BPM

## 2023-12-05 DIAGNOSIS — N18.31 TYPE 2 DIABETES MELLITUS WITH STAGE 3A CHRONIC KIDNEY DISEASE, WITHOUT LONG-TERM CURRENT USE OF INSULIN (HCC): ICD-10-CM

## 2023-12-05 DIAGNOSIS — I10 PRIMARY HYPERTENSION: ICD-10-CM

## 2023-12-05 DIAGNOSIS — E11.22 TYPE 2 DIABETES MELLITUS WITH STAGE 3A CHRONIC KIDNEY DISEASE, WITHOUT LONG-TERM CURRENT USE OF INSULIN (HCC): ICD-10-CM

## 2023-12-05 DIAGNOSIS — J01.90 ACUTE NON-RECURRENT SINUSITIS, UNSPECIFIED LOCATION: Primary | ICD-10-CM

## 2023-12-05 RX ORDER — LISINOPRIL AND HYDROCHLOROTHIAZIDE 25; 20 MG/1; MG/1
1 TABLET ORAL DAILY
Qty: 90 TABLET | Refills: 0 | Status: SHIPPED | OUTPATIENT
Start: 2023-12-05

## 2023-12-05 RX ORDER — AMOXICILLIN AND CLAVULANATE POTASSIUM 875; 125 MG/1; MG/1
1 TABLET, FILM COATED ORAL 2 TIMES DAILY
Qty: 10 TABLET | Refills: 0 | Status: SHIPPED | OUTPATIENT
Start: 2023-12-05 | End: 2023-12-10

## 2023-12-05 NOTE — PROGRESS NOTES
[address] [facility]   [facility]    Subjective:  CC:   Chief Complaint   Patient presents with    URI     Cough, nasal congestion x 2 weeks       HPI:  Chelsy Lopez is 79 y.o. male who presents today for about 10-14 days of sinus pain, congestion, cough, headache. Denies fevers. No sick contacts. No sore throat. Symptoms have not improved at all. Past Medical History:   Diagnosis Date    Diabetes ([address])     Hypercholesterolemia     Hypertension      Current Outpatient Medications on File Prior to Visit   Medication Sig Dispense Refill    dapagliflozin (FARXIGA) 10 MG tablet Take 1 tablet by mouth every morning 30 tablet 5    TRULICITY 4.5 AH/0.8ZT SOPN INJECT 4.5MG UNDER THE SKIN ONCE EVERY 7 DAYS 5 Adjustable Dose Pre-filled Pen Syringe 1    lidocaine 4 % external patch Apply to area of pain not to exceed 12 hours/day 5 each 0    pravastatin (PRAVACHOL) 40 MG tablet Take 1 tablet by mouth nightly 90 tablet 1    ezetimibe (ZETIA) 10 MG tablet Take 1 tablet by mouth daily 90 tablet 1    glipiZIDE (GLUCOTROL) 10 MG tablet Take 1 tablet by mouth 2 times daily (Patient taking differently: Take 1 tablet by mouth daily) 180 tablet 1    metoprolol tartrate (LOPRESSOR) 50 MG tablet Take 1 tablet by mouth 2 times daily 180 tablet 1    aspirin 325 MG tablet Take 1 tablet by mouth daily      gabapentin (NEURONTIN) 300 MG capsule Take 1 capsule by mouth 2 times daily for 30 days. Intended supply: 30 days Max Daily Amount: 600 mg 60 capsule 0     No current facility-administered medications on file prior to visit.        Health Maintenance Due   Topic Date Due    Pneumococcal 65+ years Vaccine (1 - PCV) Never done    Shingles vaccine (1 of 2) Never done    Hepatitis B vaccine (1 of 3 - Risk 3-dose series) Never done    Respiratory Syncytial Virus (RSV) age 61 yrs+ (1 - 1-dose 60+ series) Never done    COVID-19 Vaccine (3 - 2023-24 season) 09/01/2023    Lipids  12/22/2023

## 2023-12-05 NOTE — PROGRESS NOTES
[address] [facility]   [facility]    Subjective:  CC:   Chief Complaint   Patient presents with    URI     Cough, nasal congestion x 2 weeks       HPI:  Sheila Churchill is 79 y.o. male who presents today for ***      Past Medical History:   Diagnosis Date    Diabetes ([address])     Hypercholesterolemia     Hypertension      Current Outpatient Medications on File Prior to Visit   Medication Sig Dispense Refill    dapagliflozin (FARXIGA) 10 MG tablet Take 1 tablet by mouth every morning 30 tablet 5    TRULICITY 4.5 NF/2.0KD SOPN INJECT 4.5MG UNDER THE SKIN ONCE EVERY 7 DAYS 5 Adjustable Dose Pre-filled Pen Syringe 1    lidocaine 4 % external patch Apply to area of pain not to exceed 12 hours/day 5 each 0    pravastatin (PRAVACHOL) 40 MG tablet Take 1 tablet by mouth nightly 90 tablet 1    ezetimibe (ZETIA) 10 MG tablet Take 1 tablet by mouth daily 90 tablet 1    glipiZIDE (GLUCOTROL) 10 MG tablet Take 1 tablet by mouth 2 times daily (Patient taking differently: Take 1 tablet by mouth daily) 180 tablet 1    metoprolol tartrate (LOPRESSOR) 50 MG tablet Take 1 tablet by mouth 2 times daily 180 tablet 1    aspirin 325 MG tablet Take 1 tablet by mouth daily      zoster recombinant adjuvanted vaccine [facility]) 50 MCG/0.5ML SUSR injection Inject 0.5 mLs into the muscle See Admin Instructions 1 dose now and repeat in 2-6 months (Patient not taking: Reported on 12/5/2023) 0.5 mL 1    gabapentin (NEURONTIN) 300 MG capsule Take 1 capsule by mouth 2 times daily for 30 days. Intended supply: 30 days Max Daily Amount: 600 mg 60 capsule 0     No current facility-administered medications on file prior to visit.        Health Maintenance Due   Topic Date Due    Pneumococcal 65+ years Vaccine (1 - PCV) Never done    Shingles vaccine (1 of 2) Never done    Hepatitis B vaccine (1 of 3 - Risk 3-dose series) Never done    Respiratory Syncytial Virus (RSV) age 61 yrs+ (1 - 1-dose 60+ series) Never done

## 2023-12-06 ENCOUNTER — APPOINTMENT (OUTPATIENT)
Facility: HOSPITAL | Age: 68
End: 2023-12-06
Payer: MEDICARE

## 2023-12-06 ENCOUNTER — HOSPITAL ENCOUNTER (EMERGENCY)
Facility: HOSPITAL | Age: 68
Discharge: HOME | End: 2023-12-06
Attending: STUDENT IN AN ORGANIZED HEALTH CARE EDUCATION/TRAINING PROGRAM
Payer: MEDICARE

## 2023-12-06 VITALS
RESPIRATION RATE: 20 BRPM | HEART RATE: 88 BPM | DIASTOLIC BLOOD PRESSURE: 77 MMHG | TEMPERATURE: 97.9 F | BODY MASS INDEX: 39.1 KG/M2 | SYSTOLIC BLOOD PRESSURE: 132 MMHG | OXYGEN SATURATION: 95 % | HEIGHT: 73 IN | WEIGHT: 295 LBS

## 2023-12-06 DIAGNOSIS — S93.602A SPRAIN OF LEFT FOOT, INITIAL ENCOUNTER: Primary | ICD-10-CM

## 2023-12-06 PROCEDURE — 99283 EMERGENCY DEPT VISIT LOW MDM: CPT

## 2023-12-06 PROCEDURE — 73630 X-RAY EXAM OF FOOT: CPT

## 2023-12-06 ASSESSMENT — PAIN - FUNCTIONAL ASSESSMENT: PAIN_FUNCTIONAL_ASSESSMENT: 0-10

## 2023-12-06 ASSESSMENT — PAIN SCALES - GENERAL: PAINLEVEL_OUTOF10: 10

## 2023-12-06 NOTE — ED TRIAGE NOTES
Pt c/o left foot pain. Started with pain over a week ago and now pain in his ankle. Pain started on top of his foot. Meli Couch

## 2023-12-06 NOTE — DISCHARGE INSTRUCTIONS
Thank you! Thank you for allowing me to care for you in the emergency department. It is my goal to provide you with excellent care. If you have not received excellent quality care, please ask to speak to the nurse manager. Please fill out the survey that will come to you by mail or email since we listen to your feedback! Below you will find a list of your tests from today's visit. Should you have any questions, please do not hesitate to call the emergency department. Labs  No results found for this or any previous visit (from the past 12 hour(s)). Radiologic Studies  XR FOOT LEFT (MIN 3 VIEWS)   Final Result   No acute abnormality.        ------------------------------------------------------------------------------------------------------------  The exam and treatment you received in the Emergency Department were for an urgent problem and are not intended as complete care. It is important that you follow-up with a doctor, nurse practitioner, or physician assistant to:  (1) confirm your diagnosis,  (2) re-evaluation of changes in your illness and treatment, and  (3) for ongoing care. Please take your discharge instructions with you when you go to your follow-up appointment. If you have any problem arranging a follow-up appointment, contact the Emergency Department. If your symptoms become worse or you do not improve as expected and you are unable to reach your health care provider, please return to the Emergency Department. We are available 24 hours a day. If a prescription has been provided, please have it filled as soon as possible to prevent a delay in treatment. If you have any questions or reservations about taking the medication due to side effects or interactions with other medications, please call your primary care provider or contact the ER.

## 2023-12-11 DIAGNOSIS — I10 PRIMARY HYPERTENSION: ICD-10-CM

## 2023-12-11 RX ORDER — METOPROLOL TARTRATE 50 MG/1
50 TABLET, FILM COATED ORAL 2 TIMES DAILY
Qty: 180 TABLET | Refills: 0 | Status: SHIPPED | OUTPATIENT
Start: 2023-12-11

## 2023-12-12 ENCOUNTER — OFFICE VISIT (OUTPATIENT)
Facility: CLINIC | Age: 68
End: 2023-12-12
Payer: MEDICARE

## 2023-12-12 VITALS
HEART RATE: 91 BPM | TEMPERATURE: 98.7 F | BODY MASS INDEX: 38.43 KG/M2 | HEIGHT: 73 IN | RESPIRATION RATE: 20 BRPM | DIASTOLIC BLOOD PRESSURE: 78 MMHG | OXYGEN SATURATION: 94 % | SYSTOLIC BLOOD PRESSURE: 118 MMHG | WEIGHT: 290 LBS

## 2023-12-12 DIAGNOSIS — M79.605 BILATERAL LOWER EXTREMITY PAIN: Primary | ICD-10-CM

## 2023-12-12 DIAGNOSIS — M79.604 BILATERAL LOWER EXTREMITY PAIN: Primary | ICD-10-CM

## 2023-12-12 PROCEDURE — 3078F DIAST BP <80 MM HG: CPT | Performed by: FAMILY MEDICINE

## 2023-12-12 PROCEDURE — G8427 DOCREV CUR MEDS BY ELIG CLIN: HCPCS | Performed by: FAMILY MEDICINE

## 2023-12-12 PROCEDURE — G8483 FLU IMM NO ADMIN DOC REA: HCPCS | Performed by: FAMILY MEDICINE

## 2023-12-12 PROCEDURE — 1036F TOBACCO NON-USER: CPT | Performed by: FAMILY MEDICINE

## 2023-12-12 PROCEDURE — 1123F ACP DISCUSS/DSCN MKR DOCD: CPT | Performed by: FAMILY MEDICINE

## 2023-12-12 PROCEDURE — 3017F COLORECTAL CA SCREEN DOC REV: CPT | Performed by: FAMILY MEDICINE

## 2023-12-12 PROCEDURE — 99214 OFFICE O/P EST MOD 30 MIN: CPT | Performed by: FAMILY MEDICINE

## 2023-12-12 PROCEDURE — G8417 CALC BMI ABV UP PARAM F/U: HCPCS | Performed by: FAMILY MEDICINE

## 2023-12-12 PROCEDURE — 3074F SYST BP LT 130 MM HG: CPT | Performed by: FAMILY MEDICINE

## 2023-12-12 RX ORDER — DULOXETIN HYDROCHLORIDE 60 MG/1
60 CAPSULE, DELAYED RELEASE ORAL DAILY
Qty: 90 CAPSULE | Refills: 0 | Status: SHIPPED | OUTPATIENT
Start: 2023-12-12

## 2023-12-12 NOTE — PROGRESS NOTES
Chief Complaint   Patient presents with    Foot Pain       History of Present Illness:  David De Anda is a 79 y.o. male w/ PMHx of DM2, HLD, HTN who presents to clinic for follow-up after treatment in the emergency department.    - Started about one week ago. Was seen here at [facility] for a cold (states he was given Augmentin). - No recent injury. Broke L foot when he was 25years old. - Pt presented to the ED on 12/6/23 for L foot pain. - L foot XR demonstrated NAP. - Was told he has a sprain. Recommended f/up w/ orthopedics.  - Pain has persisted. - Pt went to [id number] [facility] ER on night of 12/8 - 12/9/23.  - Has ongoing pain diffusely of both feet, ankles, extending up legs. - Denies chest pain or SOB. DM2:  - Last A1c 7.7 on 9/13/23.  - Prescribed metformin 1000 mg BID, Farxiga 10 mg daily, Trulicity 4.5 mg daily.  - Pt scheduled w/ PCP for 12/18/23. HLD:  - Pt prescribed pravastatin 40 mg QHS. - Last LDL 75.4 on 12/22/22.         Past Medical History:   Diagnosis Date    Diabetes ([address])     Hypercholesterolemia     Hypertension        Current Outpatient Medications   Medication Sig Dispense Refill    DULoxetine (CYMBALTA) 60 MG extended release capsule Take 1 capsule by mouth daily 90 capsule 0    metoprolol tartrate (LOPRESSOR) 50 MG tablet Take 1 tablet by mouth twice daily 180 tablet 0    verapamil (CALAN SR) 180 MG extended release tablet Take 1 tablet by mouth nightly 90 tablet 0    lisinopril-hydroCHLOROthiazide (PRINZIDE;ZESTORETIC) 20-25 MG per tablet Take 1 tablet by mouth once daily 90 tablet 0    metFORMIN (GLUCOPHAGE) 1000 MG tablet TAKE 1 TABLET BY MOUTH TWICE DAILY WITH MEALS 180 tablet 0    dapagliflozin (FARXIGA) 10 MG tablet Take 1 tablet by mouth every morning 30 tablet 5    TRULICITY 4.5 TZ/3.5IK SOPN INJECT 4.5MG UNDER THE SKIN ONCE EVERY 7 DAYS 5 Adjustable Dose Pre-filled Pen Syringe 1    pravastatin (PRAVACHOL) 40 MG tablet Take 1 tablet by mouth nightly 90 tablet 1    ezetimibe (ZETIA) 10

## 2023-12-27 ENCOUNTER — NURSE ONLY (OUTPATIENT)
Facility: CLINIC | Age: 68
End: 2023-12-27

## 2023-12-27 DIAGNOSIS — M79.671 PAIN IN BOTH FEET: ICD-10-CM

## 2023-12-27 DIAGNOSIS — M10.9 ACUTE GOUT OF MULTIPLE SITES, UNSPECIFIED CAUSE: ICD-10-CM

## 2023-12-27 DIAGNOSIS — M79.672 PAIN IN BOTH FEET: ICD-10-CM

## 2023-12-28 DIAGNOSIS — D72.829 LEUKOCYTOSIS, UNSPECIFIED TYPE: Primary | ICD-10-CM

## 2023-12-28 LAB — PERIPHERAL SMEAR, MD REVIEW: NORMAL

## 2024-01-09 ENCOUNTER — TELEPHONE (OUTPATIENT)
Facility: CLINIC | Age: 69
End: 2024-01-09

## 2024-01-09 DIAGNOSIS — M10.9 ACUTE GOUT OF MULTIPLE SITES, UNSPECIFIED CAUSE: Primary | ICD-10-CM

## 2024-01-09 NOTE — TELEPHONE ENCOUNTER
Pt's wife called to speak with a nurse about an everyday medication for gout. Recently diagnosed and wants to know if there is anything to help with the symptoms.

## 2024-01-10 RX ORDER — ALLOPURINOL 300 MG/1
300 TABLET ORAL DAILY
Qty: 90 TABLET | Refills: 1 | Status: SHIPPED | OUTPATIENT
Start: 2024-01-10

## 2024-01-15 RX ORDER — EZETIMIBE 10 MG/1
10 TABLET ORAL DAILY
Qty: 90 TABLET | Refills: 0 | Status: SHIPPED | OUTPATIENT
Start: 2024-01-15

## 2024-01-19 ENCOUNTER — NURSE ONLY (OUTPATIENT)
Facility: CLINIC | Age: 69
End: 2024-01-19

## 2024-01-19 DIAGNOSIS — D72.829 LEUKOCYTOSIS, UNSPECIFIED TYPE: ICD-10-CM

## 2024-01-20 LAB
BASOPHILS # BLD: 0.1 K/UL (ref 0–0.1)
BASOPHILS NFR BLD: 1 % (ref 0–1)
DIFFERENTIAL METHOD BLD: ABNORMAL
EOSINOPHIL # BLD: 1.1 K/UL (ref 0–0.4)
EOSINOPHIL NFR BLD: 10 % (ref 0–7)
ERYTHROCYTE [DISTWIDTH] IN BLOOD BY AUTOMATED COUNT: 15 % (ref 11.5–14.5)
HCT VFR BLD AUTO: 45.8 % (ref 36.6–50.3)
HGB BLD-MCNC: 14.7 G/DL (ref 12.1–17)
IMM GRANULOCYTES # BLD AUTO: 0 K/UL (ref 0–0.04)
IMM GRANULOCYTES NFR BLD AUTO: 0 % (ref 0–0.5)
LYMPHOCYTES # BLD: 2.9 K/UL (ref 0.8–3.5)
LYMPHOCYTES NFR BLD: 25 % (ref 12–49)
MCH RBC QN AUTO: 29.8 PG (ref 26–34)
MCHC RBC AUTO-ENTMCNC: 32.1 G/DL (ref 30–36.5)
MCV RBC AUTO: 92.9 FL (ref 80–99)
MONOCYTES # BLD: 0.9 K/UL (ref 0–1)
MONOCYTES NFR BLD: 8 % (ref 5–13)
NEUTS SEG # BLD: 6.4 K/UL (ref 1.8–8)
NEUTS SEG NFR BLD: 56 % (ref 32–75)
NRBC # BLD: 0 K/UL (ref 0–0.01)
NRBC BLD-RTO: 0 PER 100 WBC
PLATELET # BLD AUTO: 332 K/UL (ref 150–400)
PMV BLD AUTO: 10.1 FL (ref 8.9–12.9)
RBC # BLD AUTO: 4.93 M/UL (ref 4.1–5.7)
RBC MORPH BLD: ABNORMAL
WBC # BLD AUTO: 11.4 K/UL (ref 4.1–11.1)

## 2024-01-25 ENCOUNTER — TELEPHONE (OUTPATIENT)
Facility: CLINIC | Age: 69
End: 2024-01-25

## 2024-02-06 ENCOUNTER — OFFICE VISIT (OUTPATIENT)
Facility: CLINIC | Age: 69
End: 2024-02-06
Payer: MEDICARE

## 2024-02-06 VITALS
WEIGHT: 287 LBS | HEIGHT: 73 IN | BODY MASS INDEX: 38.04 KG/M2 | HEART RATE: 91 BPM | RESPIRATION RATE: 14 BRPM | TEMPERATURE: 97.1 F | SYSTOLIC BLOOD PRESSURE: 102 MMHG | OXYGEN SATURATION: 94 % | DIASTOLIC BLOOD PRESSURE: 63 MMHG

## 2024-02-06 DIAGNOSIS — N18.31 TYPE 2 DIABETES MELLITUS WITH STAGE 3A CHRONIC KIDNEY DISEASE, WITHOUT LONG-TERM CURRENT USE OF INSULIN (HCC): ICD-10-CM

## 2024-02-06 DIAGNOSIS — E66.01 SEVERE OBESITY (BMI 35.0-39.9) WITH COMORBIDITY (HCC): ICD-10-CM

## 2024-02-06 DIAGNOSIS — M54.16 RADICULOPATHY, LUMBAR REGION: Primary | ICD-10-CM

## 2024-02-06 DIAGNOSIS — Z91.148 CONTROLLED SUBSTANCE AGREEMENT BROKEN: ICD-10-CM

## 2024-02-06 DIAGNOSIS — N18.31 STAGE 3A CHRONIC KIDNEY DISEASE (HCC): ICD-10-CM

## 2024-02-06 DIAGNOSIS — E11.22 TYPE 2 DIABETES MELLITUS WITH STAGE 3A CHRONIC KIDNEY DISEASE, WITHOUT LONG-TERM CURRENT USE OF INSULIN (HCC): ICD-10-CM

## 2024-02-06 DIAGNOSIS — M25.551 RIGHT HIP PAIN: ICD-10-CM

## 2024-02-06 DIAGNOSIS — Z79.899 LONG TERM USE OF DRUG: ICD-10-CM

## 2024-02-06 PROCEDURE — 3078F DIAST BP <80 MM HG: CPT | Performed by: FAMILY MEDICINE

## 2024-02-06 PROCEDURE — 3074F SYST BP LT 130 MM HG: CPT | Performed by: FAMILY MEDICINE

## 2024-02-06 PROCEDURE — G8483 FLU IMM NO ADMIN DOC REA: HCPCS | Performed by: FAMILY MEDICINE

## 2024-02-06 PROCEDURE — 3046F HEMOGLOBIN A1C LEVEL >9.0%: CPT | Performed by: FAMILY MEDICINE

## 2024-02-06 PROCEDURE — 2022F DILAT RTA XM EVC RTNOPTHY: CPT | Performed by: FAMILY MEDICINE

## 2024-02-06 PROCEDURE — 1123F ACP DISCUSS/DSCN MKR DOCD: CPT | Performed by: FAMILY MEDICINE

## 2024-02-06 PROCEDURE — 1036F TOBACCO NON-USER: CPT | Performed by: FAMILY MEDICINE

## 2024-02-06 PROCEDURE — 99214 OFFICE O/P EST MOD 30 MIN: CPT | Performed by: FAMILY MEDICINE

## 2024-02-06 PROCEDURE — G8417 CALC BMI ABV UP PARAM F/U: HCPCS | Performed by: FAMILY MEDICINE

## 2024-02-06 PROCEDURE — 3017F COLORECTAL CA SCREEN DOC REV: CPT | Performed by: FAMILY MEDICINE

## 2024-02-06 PROCEDURE — G8427 DOCREV CUR MEDS BY ELIG CLIN: HCPCS | Performed by: FAMILY MEDICINE

## 2024-02-06 RX ORDER — GABAPENTIN 300 MG/1
300 CAPSULE ORAL 2 TIMES DAILY
Qty: 60 CAPSULE | Refills: 0 | Status: SHIPPED | OUTPATIENT
Start: 2024-02-06 | End: 2024-03-07

## 2024-02-06 RX ORDER — ACETAMINOPHEN 500 MG
500 TABLET ORAL EVERY 6 HOURS PRN
COMMUNITY

## 2024-02-06 ASSESSMENT — PATIENT HEALTH QUESTIONNAIRE - PHQ9
2. FEELING DOWN, DEPRESSED OR HOPELESS: 0
SUM OF ALL RESPONSES TO PHQ QUESTIONS 1-9: 0
1. LITTLE INTEREST OR PLEASURE IN DOING THINGS: 0
SUM OF ALL RESPONSES TO PHQ9 QUESTIONS 1 & 2: 0

## 2024-02-06 NOTE — ASSESSMENT & PLAN NOTE
Well-controlled, continue current medications  Hemoglobin A1C   Date Value Ref Range Status   06/13/2023 7.4 (H) 4.0 - 5.6 % Final     Comment:     NEW METHOD  PLEASE NOTE NEW REFERENCE RANGE  (NOTE)  HbA1C Interpretive Ranges  <5.7              Normal  5.7 - 6.4         Consider Prediabetes  >6.5              Consider Diabetes       Hemoglobin A1C, POC   Date Value Ref Range Status   09/13/2023 7.7 % Final

## 2024-02-06 NOTE — ASSESSMENT & PLAN NOTE
Asymptomatic, continue current treatment plan  Lab Results   Component Value Date/Time     06/13/2023 08:55 AM    K 4.7 06/13/2023 08:55 AM     06/13/2023 08:55 AM    CO2 24 06/13/2023 08:55 AM    BUN 31 06/13/2023 08:55 AM    CREATININE 1.41 06/13/2023 08:55 AM    GLUCOSE 138 06/13/2023 08:55 AM    CALCIUM 9.5 06/13/2023 08:55 AM    LABGLOM 55 06/13/2023 08:55 AM

## 2024-02-06 NOTE — PROGRESS NOTES
Chief Complaint   Patient presents with    Joint Pain     Pain in hands, knees, shoulders, arms          \"Have you been to the ER, urgent care clinic since your last visit?  Hospitalized since your last visit?\"    NO    “Have you seen or consulted any other health care providers outside of [facility] since your last visit?”    NO           Health Maintenance Due   Topic Date Due    Pneumococcal 65+ years Vaccine (1 - PCV) Never done    Shingles vaccine (1 of 2) Never done    Respiratory Syncytial Virus (RSV) Pregnant or age 60 yrs+ (1 - 1-dose 60+ series) Never done    COVID-19 Vaccine (3 - 2023-24 season) 09/01/2023    Annual Wellness Visit (Medicare Advantage)  01/01/2024       
(2/6/2024 11:21 AM)       Assessment and orders:       ICD-10-CM    1. Radiculopathy, lumbar region  M54.16 gabapentin (NEURONTIN) 300 MG capsule      2. Right hip pain  M25.551 gabapentin (NEURONTIN) 300 MG capsule      3. Stage 3a chronic kidney disease (HCC)  N18.31 Comprehensive Metabolic Panel     Comprehensive Metabolic Panel      4. Type 2 diabetes mellitus with stage 3a chronic kidney disease, without long-term current use of insulin (formerly [facility])  E11.22 Hemoglobin A1C    N18.31 Hemoglobin A1C      5. Long term use of drug  Z79.899       6. Severe obesity (BMI 35.0-39.9) with comorbidity (formerly [facility])  E66.01       7. Controlled substance agreement broken  Z91.148         1. Radiculopathy, lumbar region  -     gabapentin (NEURONTIN) 300 MG capsule; Take 1 capsule by mouth 2 times daily for 30 days. Intended supply: 30 days Max Daily Amount: 600 mg, Disp-60 capsule, R-0Normal  2. Right hip pain  -     gabapentin (NEURONTIN) 300 MG capsule; Take 1 capsule by mouth 2 times daily for 30 days. Intended supply: 30 days Max Daily Amount: 600 mg, Disp-60 capsule, R-0Normal  3. Stage 3a chronic kidney disease (formerly [facility])  Assessment & Plan:   Asymptomatic, continue current treatment plan  Lab Results   Component Value Date/Time     06/13/2023 08:55 AM    K 4.7 06/13/2023 08:55 AM     06/13/2023 08:55 AM    CO2 24 06/13/2023 08:55 AM    BUN 31 06/13/2023 08:55 AM    CREATININE 1.41 06/13/2023 08:55 AM    GLUCOSE 138 06/13/2023 08:55 AM    CALCIUM 9.5 06/13/2023 08:55 AM    LABGLOM 55 06/13/2023 08:55 AM          Orders:  -     Comprehensive Metabolic Panel; Future  4. Type 2 diabetes mellitus with stage 3a chronic kidney disease, without long-term current use of insulin (formerly [facility])  Assessment & Plan:   Well-controlled, continue current medications  Hemoglobin A1C   Date Value Ref Range Status   06/13/2023 7.4 (H) 4.0 - 5.6 % Final     Comment:     NEW METHOD  PLEASE NOTE NEW REFERENCE RANGE  (NOTE)  HbA1C Interpretive Ranges  <5.7

## 2024-02-07 LAB
ALBUMIN SERPL-MCNC: 3.3 G/DL (ref 3.5–5)
ALBUMIN/GLOB SERPL: 1.1 (ref 1.1–2.2)
ALP SERPL-CCNC: 79 U/L (ref 45–117)
ALT SERPL-CCNC: 50 U/L (ref 12–78)
ANION GAP SERPL CALC-SCNC: 8 MMOL/L (ref 5–15)
AST SERPL-CCNC: 27 U/L (ref 15–37)
BILIRUB SERPL-MCNC: 0.4 MG/DL (ref 0.2–1)
BUN SERPL-MCNC: 38 MG/DL (ref 6–20)
BUN/CREAT SERPL: 23 (ref 12–20)
CALCIUM SERPL-MCNC: 9.5 MG/DL (ref 8.5–10.1)
CHLORIDE SERPL-SCNC: 110 MMOL/L (ref 97–108)
CO2 SERPL-SCNC: 22 MMOL/L (ref 21–32)
CREAT SERPL-MCNC: 1.65 MG/DL (ref 0.7–1.3)
EST. AVERAGE GLUCOSE BLD GHB EST-MCNC: 154 MG/DL
GLOBULIN SER CALC-MCNC: 3 G/DL (ref 2–4)
GLUCOSE SERPL-MCNC: 176 MG/DL (ref 65–100)
HBA1C MFR BLD: 7 % (ref 4–5.6)
POTASSIUM SERPL-SCNC: 4.4 MMOL/L (ref 3.5–5.1)
PROT SERPL-MCNC: 6.3 G/DL (ref 6.4–8.2)
SODIUM SERPL-SCNC: 140 MMOL/L (ref 136–145)

## 2024-02-26 RX ORDER — PRAVASTATIN SODIUM 40 MG
40 TABLET ORAL NIGHTLY
Qty: 90 TABLET | Refills: 0 | Status: SHIPPED | OUTPATIENT
Start: 2024-02-26

## 2024-02-28 DIAGNOSIS — E11.22 TYPE 2 DIABETES MELLITUS WITH STAGE 3A CHRONIC KIDNEY DISEASE, WITHOUT LONG-TERM CURRENT USE OF INSULIN (HCC): ICD-10-CM

## 2024-02-28 DIAGNOSIS — N18.31 TYPE 2 DIABETES MELLITUS WITH STAGE 3A CHRONIC KIDNEY DISEASE, WITHOUT LONG-TERM CURRENT USE OF INSULIN (HCC): ICD-10-CM

## 2024-02-28 DIAGNOSIS — I10 PRIMARY HYPERTENSION: ICD-10-CM

## 2024-02-28 RX ORDER — LISINOPRIL AND HYDROCHLOROTHIAZIDE 25; 20 MG/1; MG/1
1 TABLET ORAL DAILY
Qty: 90 TABLET | Refills: 0 | Status: SHIPPED | OUTPATIENT
Start: 2024-02-28

## 2024-03-06 DIAGNOSIS — I10 PRIMARY HYPERTENSION: ICD-10-CM

## 2024-03-06 RX ORDER — METOPROLOL TARTRATE 50 MG/1
50 TABLET, FILM COATED ORAL 2 TIMES DAILY
Qty: 180 TABLET | Refills: 1 | Status: SHIPPED | OUTPATIENT
Start: 2024-03-06

## 2024-03-25 DIAGNOSIS — M54.16 RADICULOPATHY, LUMBAR REGION: ICD-10-CM

## 2024-03-25 DIAGNOSIS — M25.551 RIGHT HIP PAIN: ICD-10-CM

## 2024-03-26 RX ORDER — GABAPENTIN 300 MG/1
CAPSULE ORAL
Qty: 60 CAPSULE | Refills: 1 | Status: SHIPPED | OUTPATIENT
Start: 2024-03-26 | End: 2024-05-25

## 2024-04-01 ENCOUNTER — OFFICE VISIT (OUTPATIENT)
Facility: CLINIC | Age: 69
End: 2024-04-01
Payer: MEDICARE

## 2024-04-01 VITALS
TEMPERATURE: 98.2 F | DIASTOLIC BLOOD PRESSURE: 63 MMHG | HEIGHT: 73 IN | SYSTOLIC BLOOD PRESSURE: 109 MMHG | RESPIRATION RATE: 14 BRPM | HEART RATE: 86 BPM | OXYGEN SATURATION: 96 % | WEIGHT: 290 LBS | BODY MASS INDEX: 38.43 KG/M2

## 2024-04-01 DIAGNOSIS — B02.9 HERPES ZOSTER WITHOUT COMPLICATION: Primary | ICD-10-CM

## 2024-04-01 PROCEDURE — 3078F DIAST BP <80 MM HG: CPT | Performed by: FAMILY MEDICINE

## 2024-04-01 PROCEDURE — 99213 OFFICE O/P EST LOW 20 MIN: CPT | Performed by: FAMILY MEDICINE

## 2024-04-01 PROCEDURE — 1123F ACP DISCUSS/DSCN MKR DOCD: CPT | Performed by: FAMILY MEDICINE

## 2024-04-01 PROCEDURE — 3074F SYST BP LT 130 MM HG: CPT | Performed by: FAMILY MEDICINE

## 2024-04-01 RX ORDER — PREDNISONE 20 MG/1
20 TABLET ORAL DAILY
Qty: 5 TABLET | Refills: 0 | Status: SHIPPED | OUTPATIENT
Start: 2024-04-01 | End: 2024-04-06

## 2024-04-01 RX ORDER — VALACYCLOVIR HYDROCHLORIDE 1 G/1
TABLET, FILM COATED ORAL
Qty: 36 TABLET | Refills: 0 | Status: SHIPPED | OUTPATIENT
Start: 2024-04-01

## 2024-04-01 RX ORDER — ZOSTER VACCINE RECOMBINANT, ADJUVANTED 50 MCG/0.5
0.5 KIT INTRAMUSCULAR SEE ADMIN INSTRUCTIONS
Qty: 0.5 ML | Refills: 1 | Status: SHIPPED | OUTPATIENT
Start: 2024-04-01 | End: 2024-09-28

## 2024-04-01 ASSESSMENT — ENCOUNTER SYMPTOMS
CHEST TIGHTNESS: 0
ABDOMINAL PAIN: 0

## 2024-04-01 NOTE — PROGRESS NOTES
[facility]    History of Present Illness:   Cam Levine is a 68 y.o. male with history of HTN, Diabetes, CKD stage 3, Obesity,   CC: Skin rash  History provided by patient and Records    HPI:  Patient developed itching sensation in the back and the side running under the rm pit then developed a rash starting in the back and radiating in a line around the chest wall and most recent lesions on the left nipple area.  Noting now a soreness in the rash areas that can be excruciating.  Significantly tender to palpation.  Patient does take gabapentin currently.    Health Maintenance  Health Maintenance Due   Topic Date Due    Pneumococcal 65+ years Vaccine (1 of 2 - PCV) Never done    Shingles vaccine (1 of 2) Never done    Respiratory Syncytial Virus (RSV) Pregnant or age 60 yrs+ (1 - 1-dose 60+ series) Never done    COVID-19 Vaccine (3 - 2023-24 season) 09/01/2023    Annual Wellness Visit (Medicare Advantage)  01/01/2024       Past Medical, Family, and Social History:     Current Outpatient Medications on File Prior to Visit   Medication Sig Dispense Refill    gabapentin (NEURONTIN) 300 MG capsule TAKE 1 CAPSULE BY MOUTH TWICE DAILY (MAX  DAILY  AMOUNT  600MG) 60 capsule 1    metoprolol tartrate (LOPRESSOR) 50 MG tablet Take 1 tablet by mouth twice daily 180 tablet 1    verapamil (CALAN SR) 180 MG extended release tablet Take 1 tablet by mouth nightly 90 tablet 0    lisinopril-hydroCHLOROthiazide (PRINZIDE;ZESTORETIC) 20-25 MG per tablet Take 1 tablet by mouth once daily 90 tablet 0    metFORMIN (GLUCOPHAGE) 1000 MG tablet TAKE 1 TABLET BY MOUTH TWICE DAILY WITH MEALS 180 tablet 0    pravastatin (PRAVACHOL) 40 MG tablet Take 1 tablet by mouth nightly 90 tablet 0    acetaminophen (TYLENOL) 500 MG tablet Take 1 tablet by mouth every 6 hours as needed for Pain      ezetimibe (ZETIA) 10 MG tablet Take 1 tablet by mouth once daily 90 tablet 0    allopurinol (ZYLOPRIM) 300 MG tablet Take 1 tablet by

## 2024-04-01 NOTE — PROGRESS NOTES
Chief Complaint   Patient presents with    Skin Problem     Welps under left side/armpit and not shoulder and back - itch x1 week ago, painful          \"Have you been to the ER, urgent care clinic since your last visit?  Hospitalized since your last visit?\"    NO    “Have you seen or consulted any other health care providers outside of [facility] since your last visit?”    NO           Health Maintenance Due   Topic Date Due    Pneumococcal 65+ years Vaccine (1 of 2 - PCV) Never done    Shingles vaccine (1 of 2) Never done    Respiratory Syncytial Virus (RSV) Pregnant or age 60 yrs+ (1 - 1-dose 60+ series) Never done    COVID-19 Vaccine (3 - 2023-24 season) 09/01/2023    Annual Wellness Visit (Medicare Advantage)  01/01/2024

## 2024-04-15 RX ORDER — EZETIMIBE 10 MG/1
10 TABLET ORAL DAILY
Qty: 90 TABLET | Refills: 0 | Status: SHIPPED | OUTPATIENT
Start: 2024-04-15

## 2024-05-07 ENCOUNTER — OFFICE VISIT (OUTPATIENT)
Facility: CLINIC | Age: 69
End: 2024-05-07
Payer: MEDICARE

## 2024-05-07 VITALS
SYSTOLIC BLOOD PRESSURE: 107 MMHG | BODY MASS INDEX: 38.01 KG/M2 | RESPIRATION RATE: 18 BRPM | TEMPERATURE: 97.1 F | DIASTOLIC BLOOD PRESSURE: 65 MMHG | WEIGHT: 286.8 LBS | HEART RATE: 82 BPM | OXYGEN SATURATION: 95 % | HEIGHT: 73 IN

## 2024-05-07 DIAGNOSIS — M54.16 RADICULOPATHY, LUMBAR REGION: Primary | ICD-10-CM

## 2024-05-07 DIAGNOSIS — N18.31 STAGE 3A CHRONIC KIDNEY DISEASE (HCC): ICD-10-CM

## 2024-05-07 DIAGNOSIS — Z00.00 MEDICARE ANNUAL WELLNESS VISIT, SUBSEQUENT: ICD-10-CM

## 2024-05-07 DIAGNOSIS — M25.551 RIGHT HIP PAIN: ICD-10-CM

## 2024-05-07 PROCEDURE — 3074F SYST BP LT 130 MM HG: CPT | Performed by: FAMILY MEDICINE

## 2024-05-07 PROCEDURE — G0439 PPPS, SUBSEQ VISIT: HCPCS | Performed by: FAMILY MEDICINE

## 2024-05-07 PROCEDURE — 3078F DIAST BP <80 MM HG: CPT | Performed by: FAMILY MEDICINE

## 2024-05-07 PROCEDURE — 1123F ACP DISCUSS/DSCN MKR DOCD: CPT | Performed by: FAMILY MEDICINE

## 2024-05-07 PROCEDURE — 99214 OFFICE O/P EST MOD 30 MIN: CPT | Performed by: FAMILY MEDICINE

## 2024-05-07 RX ORDER — GABAPENTIN 300 MG/1
CAPSULE ORAL
Qty: 60 CAPSULE | Refills: 2 | Status: SHIPPED | OUTPATIENT
Start: 2024-06-07 | End: 2024-08-07

## 2024-05-07 RX ORDER — PNEUMOCOCCAL 20-VALENT CONJUGATE VACCINE 2.2; 2.2; 2.2; 2.2; 2.2; 2.2; 2.2; 2.2; 2.2; 2.2; 2.2; 2.2; 2.2; 2.2; 2.2; 2.2; 4.4; 2.2; 2.2; 2.2 UG/.5ML; UG/.5ML; UG/.5ML; UG/.5ML; UG/.5ML; UG/.5ML; UG/.5ML; UG/.5ML; UG/.5ML; UG/.5ML; UG/.5ML; UG/.5ML; UG/.5ML; UG/.5ML; UG/.5ML; UG/.5ML; UG/.5ML; UG/.5ML; UG/.5ML; UG/.5ML
0.5 INJECTION, SUSPENSION INTRAMUSCULAR ONCE
Qty: 1 EACH | Refills: 0 | Status: SHIPPED | OUTPATIENT
Start: 2024-05-07 | End: 2024-05-07

## 2024-05-07 ASSESSMENT — PATIENT HEALTH QUESTIONNAIRE - PHQ9
SUM OF ALL RESPONSES TO PHQ9 QUESTIONS 1 & 2: 0
SUM OF ALL RESPONSES TO PHQ QUESTIONS 1-9: 0
SUM OF ALL RESPONSES TO PHQ QUESTIONS 1-9: 0
1. LITTLE INTEREST OR PLEASURE IN DOING THINGS: NOT AT ALL
SUM OF ALL RESPONSES TO PHQ QUESTIONS 1-9: 0
SUM OF ALL RESPONSES TO PHQ QUESTIONS 1-9: 0
2. FEELING DOWN, DEPRESSED OR HOPELESS: NOT AT ALL

## 2024-05-07 ASSESSMENT — ENCOUNTER SYMPTOMS: BACK PAIN: 1

## 2024-05-07 NOTE — PROGRESS NOTES
[facility]  Chief Complaint   Patient presents with    Medicare AWV       History of Present Illness:   Cam Levine is a 68 y.o. male       HPI:  Here for chronic pain.   Pain with hands, knees, shoulders, arm. No numbness or weakness in legs. No foot drop.    Last cr 1.65  He has one refill left on gabapentin. It makes pain tolerable.     He also had shingles in march. No pain now, just some scarring.  Had one shingrix vaccine.     Health Maintenance  Health Maintenance Due   Topic Date Due    Pneumococcal 65+ years Vaccine (1 of 2 - PCV) Never done    Respiratory Syncytial Virus (RSV) Pregnant or age 60 yrs+ (1 - 1-dose 60+ series) Never done    COVID-19 Vaccine (3 - 2023-24 season) 09/01/2023       Past Medical, Family, and Social History:     Past Medical History:   Diagnosis Date    Diabetes (HCC)     Hypercholesterolemia     Hypertension       Past Surgical History:   Procedure Laterality Date    ORTHOPEDIC SURGERY  2004    rt shoulder surgery    ORTHOPEDIC SURGERY      fx lft arm       Current Outpatient Medications on File Prior to Visit   Medication Sig Dispense Refill    ezetimibe (ZETIA) 10 MG tablet Take 1 tablet by mouth once daily 90 tablet 0    zoster recombinant adjuvanted vaccine (SHINGRIX) 50 MCG/0.5ML SUSR injection Inject 0.5 mLs into the muscle See Admin Instructions 1 dose now and repeat in 2-6 months.  First short no erlier than 1 month after shingles rash resolved. 0.5 mL 1    metoprolol tartrate (LOPRESSOR) 50 MG tablet Take 1 tablet by mouth twice daily 180 tablet 1    verapamil (CALAN SR) 180 MG extended release tablet Take 1 tablet by mouth nightly 90 tablet 0    lisinopril-hydroCHLOROthiazide (PRINZIDE;ZESTORETIC) 20-25 MG per tablet Take 1 tablet by mouth once daily 90 tablet 0    metFORMIN (GLUCOPHAGE) 1000 MG tablet TAKE 1 TABLET BY MOUTH TWICE DAILY WITH MEALS 180 tablet 0    pravastatin (PRAVACHOL) 40 MG tablet Take 1 tablet by mouth nightly 90 tablet 0

## 2024-05-07 NOTE — PATIENT INSTRUCTIONS
change.  If you tend to feel dizzy after you take medicine, avoid activity at that time. Try being active before you take your medicine. This will reduce your risk of falls.  If you plan to be active at home, make sure to clear your space before you get started. Remove things like TV cords, coffee tables, and throw rugs. It's safest to have plenty of space to move freely.  The key to getting more active is to take it slow and steady. Try to improve only a little bit at a time. Pick just one area to improve on at first. And if an activity hurts, stop and talk to your doctor.  Where can you learn more?  Go to https://www.[employer].net/patientEd and enter P600 to learn more about \"Learning About Being Active as an Older Adult.\"  Current as of: June 5, 2023               Content Version: 14.0  © 4582-8707 [employer].   Care instructions adapted under license by [employer]. If you have questions about a medical condition or this instruction, always ask your healthcare professional. [employer] disclaims any warranty or liability for your use of this information.           Starting a Weight Loss Plan: Care Instructions  Overview     It can be a challenge to lose weight. But your doctor can help you make a weight-loss plan that meets your needs.  You don't have to make a lot of big changes at once. A better idea might be to focus on small changes and stick with them. When those changes become habit, you can add a few more changes.  Some people find it helpful to take an exercise or nutrition class. If you have questions, ask your doctor about seeing a registered dietitian or an exercise specialist. You might also think about joining a weight-loss support group.  If you're not ready to make changes right now, try to pick a date in the future. Then make an appointment with your doctor to talk about when and how you'll get started with a plan.  Follow-up care is a key part of your treatment and

## 2024-05-07 NOTE — PROGRESS NOTES
The following Annual Medicare Wellness Exam is distinct and separate from the medical evaluation and decision making.  Medicare Annual Wellness Visit    Cam Levine is here for Medicare AWV    Assessment & Plan   Radiculopathy, lumbar region  -     gabapentin (NEURONTIN) 300 MG capsule; TAKE 1 CAPSULE BY MOUTH TWICE DAILY (MAX  DAILY  AMOUNT  600MG), Disp-60 capsule, R-2Normal  Medicare annual wellness visit, subsequent  Right hip pain  -     gabapentin (NEURONTIN) 300 MG capsule; TAKE 1 CAPSULE BY MOUTH TWICE DAILY (MAX  DAILY  AMOUNT  600MG), Disp-60 capsule, R-2Normal   Advised prevnar 20 and shingrix #2 at local pharmacy.   Recommendations for Preventive Services Due: see orders and patient instructions/AVS.  Recommended screening schedule for the next 5-10 years is provided to the patient in written form: see Patient Instructions/AVS.     Return in about 3 months (around 8/7/2024) for controlled substance.     Subjective       Patient's complete Health Risk Assessment and screening values have been reviewed and are found in Flowsheets. The following problems were reviewed today and where indicated follow up appointments were made and/or referrals ordered.    Positive Risk Factor Screenings with Interventions:    Fall Risk:  Do you feel unsteady or are you worried about falling? : (!) yes  2 or more falls in past year?: no  Fall with injury in past year?: no     Interventions:    Reviewed medications, home hazards, visual acuity, and co-morbidities that can increase risk for falls             Activity, Diet, and Weight:  On average, how many days per week do you engage in moderate to strenuous exercise (like a brisk walk)?: 0 days  On average, how many minutes do you engage in exercise at this level?: 0 min    Do you eat balanced/healthy meals regularly?: Yes    Body mass index is 37.84 kg/m². (!) Abnormal      Inactivity Interventions:  See AVS for additional education material  Obesity Interventions:  See

## 2024-05-07 NOTE — PROGRESS NOTES
Chief Complaint   Patient presents with    Medicare AWV       \"Have you been to the ER, urgent care clinic since your last visit?  Hospitalized since your last visit?\"    NO    “Have you seen or consulted any other health care providers outside of [facility] since your last visit?”    NO              Health Maintenance Due   Topic Date Due    Pneumococcal 65+ years Vaccine (1 of 2 - PCV) Never done    Shingles vaccine (1 of 2) Never done    Respiratory Syncytial Virus (RSV) Pregnant or age 60 yrs+ (1 - 1-dose 60+ series) Never done    COVID-19 Vaccine (3 - 2023-24 season) 09/01/2023    Annual Wellness Visit (Medicare Advantage)  01/01/2024

## 2024-05-27 DIAGNOSIS — I10 PRIMARY HYPERTENSION: ICD-10-CM

## 2024-05-27 DIAGNOSIS — E11.22 TYPE 2 DIABETES MELLITUS WITH STAGE 3A CHRONIC KIDNEY DISEASE, WITHOUT LONG-TERM CURRENT USE OF INSULIN (HCC): ICD-10-CM

## 2024-05-27 DIAGNOSIS — N18.31 TYPE 2 DIABETES MELLITUS WITH STAGE 3A CHRONIC KIDNEY DISEASE, WITHOUT LONG-TERM CURRENT USE OF INSULIN (HCC): ICD-10-CM

## 2024-05-28 RX ORDER — LISINOPRIL AND HYDROCHLOROTHIAZIDE 25; 20 MG/1; MG/1
1 TABLET ORAL DAILY
Qty: 90 TABLET | Refills: 0 | Status: SHIPPED | OUTPATIENT
Start: 2024-05-28

## 2024-05-28 RX ORDER — PRAVASTATIN SODIUM 40 MG
40 TABLET ORAL NIGHTLY
Qty: 90 TABLET | Refills: 0 | Status: SHIPPED | OUTPATIENT
Start: 2024-05-28

## 2024-06-14 ENCOUNTER — OFFICE VISIT (OUTPATIENT)
Facility: CLINIC | Age: 69
End: 2024-06-14
Payer: MEDICARE

## 2024-06-14 VITALS
DIASTOLIC BLOOD PRESSURE: 67 MMHG | HEART RATE: 83 BPM | OXYGEN SATURATION: 95 % | WEIGHT: 291 LBS | SYSTOLIC BLOOD PRESSURE: 111 MMHG | HEIGHT: 73 IN | BODY MASS INDEX: 38.57 KG/M2 | TEMPERATURE: 97.8 F | RESPIRATION RATE: 18 BRPM

## 2024-06-14 DIAGNOSIS — M72.2 PLANTAR FASCIITIS OF LEFT FOOT: Primary | ICD-10-CM

## 2024-06-14 DIAGNOSIS — M79.672 PAIN OF LEFT HEEL: ICD-10-CM

## 2024-06-14 PROCEDURE — 1123F ACP DISCUSS/DSCN MKR DOCD: CPT

## 2024-06-14 PROCEDURE — 99213 OFFICE O/P EST LOW 20 MIN: CPT

## 2024-06-14 PROCEDURE — 3074F SYST BP LT 130 MM HG: CPT

## 2024-06-14 PROCEDURE — 3078F DIAST BP <80 MM HG: CPT

## 2024-06-14 NOTE — PROGRESS NOTES
[facility]   [facility]    Subjective:  CC:   Chief Complaint   Patient presents with    Foot Pain     Heel pain x1 week       HPI:  Cam Levine is 69 y.o. male who presents today for left heel pain x 1 week. No injuries. Most painful when going to stand again after being off his feet. He has been more active recently. No skin wounds, redness, calf pain, swelling.     Objective:    Vitals:    06/14/24 0929   BP: 111/67   Site: Right Upper Arm   Position: Sitting   Cuff Size: Large Adult   Pulse: 83   Resp: 18   Temp: 97.8 °F (36.6 °C)   TempSrc: Oral   SpO2: 95%   Weight: 132 kg (291 lb)   Height: 1.854 m (6' 1\")       Physical Exam  Constitutional:       General: He is not in acute distress.  Cardiovascular:      Rate and Rhythm: Normal rate.   Pulmonary:      Effort: Pulmonary effort is normal.   Feet:      Comments: Tenderness to palpation on sole of left foot along plantar fascia.   Neurological:      Mental Status: He is alert.         No results found for this or any previous visit (from the past 12 hour(s)).  All imaging and labs ordered in clinic personally reviewed by me.     Assessment and Plan:    Given handout on plantar fasciitis. Discussed stretches/exercises. Advised cushioned shoe insert, rest, massaging foot on iced water bottle. Can try Voltaren gel.     Follow up: Return in about 4 weeks (around 7/12/2024).      Diagnosis Orders   1. Plantar fasciitis of left foot        2. Pain of left heel  diclofenac sodium (VOLTAREN) 1 % GEL          Patient's care was discussed with Dr. Peggy Arvizu.    Rosario Pinedo DO  Family Medicine Resident    Patient's past medical history, including but not limited to problem list, allergies, medications, social history, family history, and surgical history reviewed. I have reviewed pertinent labs results and other data. We discussed the expected course, resolution and complications of the diagnosis(es) in detail.

## 2024-06-14 NOTE — PATIENT INSTRUCTIONS
Try massaging your foot on an iced water bottle.   Get rest.   Stretch your foot.  Get a cushioned shoe insert.   You can try the Voltaren gel on the bottom of your foot

## 2024-06-14 NOTE — PROGRESS NOTES
\"Have you been to the ER, urgent care clinic since your last visit?  Hospitalized since your last visit?\"    NO    “Have you seen or consulted any other health care providers outside of [facility] since your last visit?”    NO            Click Here for Release of Records Request

## 2024-07-06 DIAGNOSIS — M10.9 ACUTE GOUT OF MULTIPLE SITES, UNSPECIFIED CAUSE: ICD-10-CM

## 2024-07-08 RX ORDER — ALLOPURINOL 300 MG/1
300 TABLET ORAL DAILY
Qty: 90 TABLET | Refills: 0 | Status: SHIPPED | OUTPATIENT
Start: 2024-07-08

## 2024-07-17 DIAGNOSIS — M10.9 ACUTE GOUT OF MULTIPLE SITES, UNSPECIFIED CAUSE: ICD-10-CM

## 2024-07-18 RX ORDER — ALLOPURINOL 300 MG/1
300 TABLET ORAL DAILY
Qty: 90 TABLET | Refills: 0 | Status: SHIPPED | OUTPATIENT
Start: 2024-07-18

## 2024-08-11 RX ORDER — EZETIMIBE 10 MG/1
10 TABLET ORAL DAILY
Qty: 90 TABLET | Refills: 1 | Status: SHIPPED | OUTPATIENT
Start: 2024-08-11

## 2024-08-12 ENCOUNTER — OFFICE VISIT (OUTPATIENT)
Facility: CLINIC | Age: 69
End: 2024-08-12
Payer: MEDICARE

## 2024-08-12 ENCOUNTER — TELEPHONE (OUTPATIENT)
Facility: CLINIC | Age: 69
End: 2024-08-12

## 2024-08-12 VITALS
HEART RATE: 82 BPM | TEMPERATURE: 97.3 F | WEIGHT: 291 LBS | SYSTOLIC BLOOD PRESSURE: 98 MMHG | BODY MASS INDEX: 38.57 KG/M2 | OXYGEN SATURATION: 95 % | RESPIRATION RATE: 18 BRPM | HEIGHT: 73 IN | DIASTOLIC BLOOD PRESSURE: 57 MMHG

## 2024-08-12 DIAGNOSIS — E11.22 TYPE 2 DIABETES MELLITUS WITH STAGE 3A CHRONIC KIDNEY DISEASE, WITHOUT LONG-TERM CURRENT USE OF INSULIN (HCC): Primary | ICD-10-CM

## 2024-08-12 DIAGNOSIS — N47.8 FORESKIN DOES NOT RETRACT: ICD-10-CM

## 2024-08-12 DIAGNOSIS — M10.9 ACUTE GOUT OF MULTIPLE SITES, UNSPECIFIED CAUSE: ICD-10-CM

## 2024-08-12 DIAGNOSIS — E78.2 MIXED HYPERLIPIDEMIA: ICD-10-CM

## 2024-08-12 DIAGNOSIS — I10 PRIMARY HYPERTENSION: ICD-10-CM

## 2024-08-12 DIAGNOSIS — N18.31 TYPE 2 DIABETES MELLITUS WITH STAGE 3A CHRONIC KIDNEY DISEASE, WITHOUT LONG-TERM CURRENT USE OF INSULIN (HCC): Primary | ICD-10-CM

## 2024-08-12 DIAGNOSIS — Z12.11 SCREENING FOR COLON CANCER: ICD-10-CM

## 2024-08-12 DIAGNOSIS — Z79.899 LONG TERM USE OF DRUG: ICD-10-CM

## 2024-08-12 PROBLEM — Z91.148 CONTROLLED SUBSTANCE AGREEMENT BROKEN: Status: RESOLVED | Noted: 2023-09-13 | Resolved: 2024-08-12

## 2024-08-12 LAB — HBA1C MFR BLD: 7.9 %

## 2024-08-12 PROCEDURE — 3051F HG A1C>EQUAL 7.0%<8.0%: CPT | Performed by: FAMILY MEDICINE

## 2024-08-12 PROCEDURE — G2211 COMPLEX E/M VISIT ADD ON: HCPCS | Performed by: FAMILY MEDICINE

## 2024-08-12 PROCEDURE — 3074F SYST BP LT 130 MM HG: CPT | Performed by: FAMILY MEDICINE

## 2024-08-12 PROCEDURE — 99214 OFFICE O/P EST MOD 30 MIN: CPT | Performed by: FAMILY MEDICINE

## 2024-08-12 PROCEDURE — 1123F ACP DISCUSS/DSCN MKR DOCD: CPT | Performed by: FAMILY MEDICINE

## 2024-08-12 PROCEDURE — 83036 HEMOGLOBIN GLYCOSYLATED A1C: CPT | Performed by: FAMILY MEDICINE

## 2024-08-12 PROCEDURE — 3078F DIAST BP <80 MM HG: CPT | Performed by: FAMILY MEDICINE

## 2024-08-12 RX ORDER — TRIAMCINOLONE ACETONIDE 1 MG/G
CREAM TOPICAL
Qty: 45 G | Refills: 0 | Status: SHIPPED | OUTPATIENT
Start: 2024-08-12

## 2024-08-12 RX ORDER — LISINOPRIL 20 MG/1
20 TABLET ORAL DAILY
Qty: 90 TABLET | Refills: 1 | Status: SHIPPED | OUTPATIENT
Start: 2024-08-12

## 2024-08-12 RX ORDER — HYDROCHLOROTHIAZIDE 12.5 MG/1
12.5 CAPSULE, GELATIN COATED ORAL EVERY MORNING
Qty: 30 CAPSULE | Refills: 5 | Status: SHIPPED | OUTPATIENT
Start: 2024-08-12

## 2024-08-12 RX ORDER — LISINOPRIL AND HYDROCHLOROTHIAZIDE 25; 20 MG/1; MG/1
1 TABLET ORAL DAILY
Qty: 90 TABLET | Refills: 1 | Status: CANCELLED | OUTPATIENT
Start: 2024-08-12

## 2024-08-12 RX ORDER — DULAGLUTIDE 4.5 MG/.5ML
INJECTION, SOLUTION SUBCUTANEOUS
Qty: 5 ADJUSTABLE DOSE PRE-FILLED PEN SYRINGE | Refills: 1 | Status: SHIPPED | OUTPATIENT
Start: 2024-08-12

## 2024-08-12 RX ORDER — GLIPIZIDE 10 MG/1
10 TABLET ORAL 2 TIMES DAILY
Qty: 180 TABLET | Refills: 1 | Status: SHIPPED | OUTPATIENT
Start: 2024-08-12

## 2024-08-12 RX ORDER — ALLOPURINOL 300 MG/1
300 TABLET ORAL DAILY
Qty: 90 TABLET | Refills: 1 | Status: SHIPPED | OUTPATIENT
Start: 2024-08-12

## 2024-08-12 RX ORDER — METOPROLOL TARTRATE 50 MG/1
50 TABLET, FILM COATED ORAL 2 TIMES DAILY
Qty: 180 TABLET | Refills: 1 | Status: SHIPPED | OUTPATIENT
Start: 2024-08-12

## 2024-08-12 RX ORDER — DAPAGLIFLOZIN 10 MG/1
10 TABLET, FILM COATED ORAL EVERY MORNING
Qty: 30 TABLET | Refills: 5 | Status: SHIPPED | OUTPATIENT
Start: 2024-08-12

## 2024-08-12 RX ORDER — EZETIMIBE 10 MG/1
10 TABLET ORAL DAILY
Qty: 90 TABLET | Refills: 1 | Status: SHIPPED | OUTPATIENT
Start: 2024-08-12

## 2024-08-12 RX ORDER — PRAVASTATIN SODIUM 40 MG
40 TABLET ORAL NIGHTLY
Qty: 90 TABLET | Refills: 1 | Status: SHIPPED | OUTPATIENT
Start: 2024-08-12

## 2024-08-12 ASSESSMENT — ENCOUNTER SYMPTOMS: BACK PAIN: 1

## 2024-08-12 NOTE — PROGRESS NOTES
\"Have you been to the ER, urgent care clinic since your last visit?  Hospitalized since your last visit?\"    NO    “Have you seen or consulted any other health care providers outside of [facility] since your last visit?”    NO        “Have you had a colorectal cancer screening such as a colonoscopy/FIT/Cologuard?    NO Due     Date of last Colonoscopy: 12/26/2007  No cologuard on file  Date of last FIT: 7/9/2023   No flexible sigmoidoscopy on file         Click Here for Release of Records Request     Health Maintenance Due   Topic Date Due    Pneumococcal 65+ years Vaccine (1 of 2 - PCV) Never done    Respiratory Syncytial Virus (RSV) Pregnant or age 60 yrs+ (1 - 1-dose 60+ series) Never done    COVID-19 Vaccine (3 - 2023-24 season) 09/01/2023    Diabetic foot exam  06/13/2024    Diabetic Alb to Cr ratio (uACR) test  06/13/2024    Shingles vaccine (2 of 2) 06/23/2024    Colorectal Cancer Screen  07/09/2024    Diabetic retinal exam  07/24/2024

## 2024-08-12 NOTE — PROGRESS NOTES
[facility]  Chief Complaint   Patient presents with    3 Month Follow-Up       History of Present Illness:   Cam Levine is a 69 y.o. male       HPI:  Here for f/u diabetes, htn, chronic pain.   He has not been following diet but has been taking meds.  Pain with hands, knees, shoulders, arm. No numbness or weakness in legs. No foot drop.    Last cr 1.65  He has one refill left on gabapentin. It makes pain tolerable.     He also had shingles in march. Itching around nipple worse.  C/o itching everywhere including feet. No new meds.    Having issues getting foreskin retracted. He is not circumsized.    C/o recent dizziness when bending over.    Hemoglobin A1C   Date Value Ref Range Status   02/06/2024 7.0 (H) 4.0 - 5.6 % Final     Comment:     (NOTE)  HbA1C Interpretive Ranges  <5.7              Normal  5.7 - 6.4         Consider Prediabetes  >6.5              Consider Diabetes       Hemoglobin A1C, POC   Date Value Ref Range Status   08/12/2024 7.9 % Final         Health Maintenance  Health Maintenance Due   Topic Date Due    Pneumococcal 65+ years Vaccine (1 of 2 - PCV) Never done    Respiratory Syncytial Virus (RSV) Pregnant or age 60 yrs+ (1 - 1-dose 60+ series) Never done    COVID-19 Vaccine (3 - 2023-24 season) 09/01/2023    Diabetic Alb to Cr ratio (uACR) test  06/13/2024    Shingles vaccine (2 of 2) 06/23/2024    Colorectal Cancer Screen  07/09/2024    Diabetic retinal exam  07/24/2024       Past Medical, Family, and Social History:     Past Medical History:   Diagnosis Date    Diabetes (HCC)     Hypercholesterolemia     Hypertension       Past Surgical History:   Procedure Laterality Date    ORTHOPEDIC SURGERY  2004    rt shoulder surgery    ORTHOPEDIC SURGERY      fx lft arm       Current Outpatient Medications on File Prior to Visit   Medication Sig Dispense Refill    acetaminophen (TYLENOL) 500 MG tablet Take 1 tablet by mouth every 6 hours as needed for Pain      aspirin 325 MG

## 2024-08-12 NOTE — ASSESSMENT & PLAN NOTE
Well-controlled, changes made today: reduce hctz    Orders:    metoprolol tartrate (LOPRESSOR) 50 MG tablet; Take 1 tablet by mouth 2 times daily    verapamil (CALAN SR) 180 MG extended release tablet; Take 1 tablet by mouth nightly

## 2024-08-12 NOTE — ASSESSMENT & PLAN NOTE
Borderline controlled, continue current medications and lifestyle modifications recommended    Orders:    dapagliflozin (FARXIGA) 10 MG tablet; Take 1 tablet by mouth every morning    glipiZIDE (GLUCOTROL) 10 MG tablet; Take 1 tablet by mouth 2 times daily    metFORMIN (GLUCOPHAGE) 1000 MG tablet; Take 1 tablet by mouth 2 times daily (with meals)    TRULICITY 4.5 MG/0.5ML SOPN; INJECT 4.5MG UNDER THE SKIN ONCE EVERY 7 DAYS    AMB POC HEMOGLOBIN A1C    COLLECTION CAPILLARY BLOOD SPECIMEN

## 2024-08-12 NOTE — TELEPHONE ENCOUNTER
Pt called to report that dapagliflozin (FARXIGA) 10 MG is too expensive for him, $267.    Pt would like Dr. Dhaliwal to send in a cheaper rx.   Advised pt he could contact insurance to see an alternative medication that they will cover, pt stated he will just wait for Dr. Dhaliwal to put something else in.    Please advise..

## 2024-08-12 NOTE — ASSESSMENT & PLAN NOTE
Well-controlled, continue current medications  Lab Results   Component Value Date    LDL 54.4 12/18/2023       Orders:    Lipid Panel; Future    Lipid Panel

## 2024-08-13 LAB
ALBUMIN SERPL-MCNC: 3.7 G/DL (ref 3.5–5)
ALBUMIN/GLOB SERPL: 1.4 (ref 1.1–2.2)
ALP SERPL-CCNC: 74 U/L (ref 45–117)
ALT SERPL-CCNC: 35 U/L (ref 12–78)
ANION GAP SERPL CALC-SCNC: 7 MMOL/L (ref 5–15)
AST SERPL-CCNC: 15 U/L (ref 15–37)
BASOPHILS # BLD: 0.1 K/UL (ref 0–0.1)
BASOPHILS NFR BLD: 1 % (ref 0–1)
BILIRUB SERPL-MCNC: 0.6 MG/DL (ref 0.2–1)
BUN SERPL-MCNC: 46 MG/DL (ref 6–20)
BUN/CREAT SERPL: 29 (ref 12–20)
CALCIUM SERPL-MCNC: 9.7 MG/DL (ref 8.5–10.1)
CHLORIDE SERPL-SCNC: 110 MMOL/L (ref 97–108)
CHOLEST SERPL-MCNC: 119 MG/DL
CO2 SERPL-SCNC: 23 MMOL/L (ref 21–32)
CREAT SERPL-MCNC: 1.61 MG/DL (ref 0.7–1.3)
DIFFERENTIAL METHOD BLD: ABNORMAL
EOSINOPHIL # BLD: 1 K/UL (ref 0–0.4)
EOSINOPHIL NFR BLD: 11 % (ref 0–7)
ERYTHROCYTE [DISTWIDTH] IN BLOOD BY AUTOMATED COUNT: 15.1 % (ref 11.5–14.5)
GLOBULIN SER CALC-MCNC: 2.7 G/DL (ref 2–4)
GLUCOSE SERPL-MCNC: 165 MG/DL (ref 65–100)
HCT VFR BLD AUTO: 45.4 % (ref 36.6–50.3)
HDLC SERPL-MCNC: 34 MG/DL
HDLC SERPL: 3.5 (ref 0–5)
HGB BLD-MCNC: 14 G/DL (ref 12.1–17)
IMM GRANULOCYTES # BLD AUTO: 0 K/UL (ref 0–0.04)
IMM GRANULOCYTES NFR BLD AUTO: 0 % (ref 0–0.5)
LDLC SERPL CALC-MCNC: 39.2 MG/DL (ref 0–100)
LYMPHOCYTES # BLD: 1.8 K/UL (ref 0.8–3.5)
LYMPHOCYTES NFR BLD: 20 % (ref 12–49)
MCH RBC QN AUTO: 30.3 PG (ref 26–34)
MCHC RBC AUTO-ENTMCNC: 30.8 G/DL (ref 30–36.5)
MCV RBC AUTO: 98.3 FL (ref 80–99)
MONOCYTES # BLD: 0.7 K/UL (ref 0–1)
MONOCYTES NFR BLD: 8 % (ref 5–13)
NEUTS SEG # BLD: 5.6 K/UL (ref 1.8–8)
NEUTS SEG NFR BLD: 60 % (ref 32–75)
NRBC # BLD: 0 K/UL (ref 0–0.01)
NRBC BLD-RTO: 0 PER 100 WBC
PLATELET # BLD AUTO: 295 K/UL (ref 150–400)
PMV BLD AUTO: 10.2 FL (ref 8.9–12.9)
POTASSIUM SERPL-SCNC: 4.7 MMOL/L (ref 3.5–5.1)
PROT SERPL-MCNC: 6.4 G/DL (ref 6.4–8.2)
RBC # BLD AUTO: 4.62 M/UL (ref 4.1–5.7)
SODIUM SERPL-SCNC: 140 MMOL/L (ref 136–145)
TRIGL SERPL-MCNC: 229 MG/DL
VLDLC SERPL CALC-MCNC: 45.8 MG/DL
WBC # BLD AUTO: 9.2 K/UL (ref 4.1–11.1)

## 2024-09-02 DIAGNOSIS — M25.551 RIGHT HIP PAIN: ICD-10-CM

## 2024-09-02 DIAGNOSIS — M54.16 RADICULOPATHY, LUMBAR REGION: ICD-10-CM

## 2024-09-03 ENCOUNTER — COMMUNITY OUTREACH (OUTPATIENT)
Facility: CLINIC | Age: 69
End: 2024-09-03

## 2024-09-03 RX ORDER — GABAPENTIN 300 MG/1
CAPSULE ORAL
Qty: 60 CAPSULE | Refills: 0 | Status: SHIPPED | OUTPATIENT
Start: 2024-09-03 | End: 2024-10-03

## 2024-09-10 ENCOUNTER — OFFICE VISIT (OUTPATIENT)
Age: 69
End: 2024-09-10
Payer: MEDICARE

## 2024-09-10 VITALS
HEART RATE: 83 BPM | DIASTOLIC BLOOD PRESSURE: 64 MMHG | WEIGHT: 291 LBS | BODY MASS INDEX: 38.57 KG/M2 | HEIGHT: 73 IN | SYSTOLIC BLOOD PRESSURE: 124 MMHG

## 2024-09-10 DIAGNOSIS — N47.1 PHIMOSIS: Primary | ICD-10-CM

## 2024-09-10 PROCEDURE — 99204 OFFICE O/P NEW MOD 45 MIN: CPT | Performed by: UROLOGY

## 2024-09-10 PROCEDURE — 3074F SYST BP LT 130 MM HG: CPT | Performed by: UROLOGY

## 2024-09-10 PROCEDURE — 1123F ACP DISCUSS/DSCN MKR DOCD: CPT | Performed by: UROLOGY

## 2024-09-10 PROCEDURE — 3078F DIAST BP <80 MM HG: CPT | Performed by: UROLOGY

## 2024-10-07 DIAGNOSIS — M25.551 RIGHT HIP PAIN: ICD-10-CM

## 2024-10-07 DIAGNOSIS — M54.16 RADICULOPATHY, LUMBAR REGION: ICD-10-CM

## 2024-10-07 RX ORDER — GABAPENTIN 300 MG/1
CAPSULE ORAL
Qty: 60 CAPSULE | Refills: 0 | Status: SHIPPED | OUTPATIENT
Start: 2024-10-07 | End: 2024-11-07

## 2024-10-09 NOTE — TELEPHONE ENCOUNTER
Detail Level: Detailed
Spoke to Chante, wife. Advised her that patient will need to request this from orthopedic. She verbalized understanding.   
gabapentin (NEURONTIN) 300 MG capsule   Please send to [facility].  
Hide Additional Notes?: No
details…

## 2024-10-15 ENCOUNTER — PREP FOR PROCEDURE (OUTPATIENT)
Age: 69
End: 2024-10-15

## 2024-10-16 DIAGNOSIS — N18.31 TYPE 2 DIABETES MELLITUS WITH STAGE 3A CHRONIC KIDNEY DISEASE, WITHOUT LONG-TERM CURRENT USE OF INSULIN (HCC): ICD-10-CM

## 2024-10-16 DIAGNOSIS — E11.22 TYPE 2 DIABETES MELLITUS WITH STAGE 3A CHRONIC KIDNEY DISEASE, WITHOUT LONG-TERM CURRENT USE OF INSULIN (HCC): ICD-10-CM

## 2024-10-16 RX ORDER — DULAGLUTIDE 4.5 MG/.5ML
INJECTION, SOLUTION SUBCUTANEOUS
Qty: 6 ML | Refills: 3 | Status: SHIPPED | OUTPATIENT
Start: 2024-10-16

## 2024-10-23 RX ORDER — LISINOPRIL AND HYDROCHLOROTHIAZIDE 12.5; 2 MG/1; MG/1
1 TABLET ORAL DAILY
COMMUNITY

## 2024-10-29 DIAGNOSIS — M10.9 ACUTE GOUT OF MULTIPLE SITES, UNSPECIFIED CAUSE: ICD-10-CM

## 2024-10-30 RX ORDER — ALLOPURINOL 300 MG/1
300 TABLET ORAL DAILY
Qty: 90 TABLET | Refills: 1 | Status: SHIPPED | OUTPATIENT
Start: 2024-10-30

## 2024-10-30 NOTE — DISCHARGE INSTRUCTIONS
LEAVING THE HOSPITAL AFTER YOUR CIRCUMCISION  DISCHARGE INSTRUCTIONS FOR DR. ASTORGA'S PATIENTS    Dressing  You will have a 3 layer dressing. It is important to remove all 3 layers on the day after your surgery.  You can get the dressing wet to help remove it if it is sticking to your skin.    Activity  Avoid sexual intercourse for 3 weeks.  Once you resume sexual activity, you may wish to use condoms for 2 additional weeks to avoid damage to the healing scar.  Refrain from vigorous activity (running, golfing, exercising, horseback riding, bicycling) for 3 weeks after your surgery.  Walking is fine.  You may gradually increase your pace and distance as you feel able.  Avoid heavy lifting when possible.  Avoid sitting still in one position for prolonged periods of time (more than 45 minutes).    Bathing  Do not soak in tubs, swim, or submerge yourself in water.  You can shower tomorrow.    Work  When you may return to work is variable.  It will depend on your occupation and how quickly you recover.  Specific questions can be addressed with your physician.  You should be able to return as soon as you are comfortable.  You should return within 1 week or 7 days.      Medication  Most patients experience only minimal discomfort.  Dr. Astorga prefers you treat this with Tylenol (avoid ibuprofen or aspirin or other NSAID's as they may cause additional bleeding).  You should also avoid alcohol consumption for 1 week to avoid the risk of bleeding.  You can resume most of your home medications as soon as you leave the hospital except for anti-coagulants like Coumadin, aspirin, or Eliquis.  Some diabetic medications are also not ok to resume (Metformin).  If you have questions about your regular medications, please call the office.    THINGS YOU MAY ENCOUNTER AFTER SURGERY  Pain, swelling, and bruising (normal for up to 30 days after surgery).  You may also have bruising or slight redness which is also normal.  You may also

## 2024-10-31 ENCOUNTER — ANESTHESIA (OUTPATIENT)
Facility: HOSPITAL | Age: 69
End: 2024-10-31
Payer: MEDICARE

## 2024-10-31 ENCOUNTER — ANESTHESIA EVENT (OUTPATIENT)
Facility: HOSPITAL | Age: 69
End: 2024-10-31
Payer: MEDICARE

## 2024-10-31 ENCOUNTER — HOSPITAL ENCOUNTER (OUTPATIENT)
Facility: HOSPITAL | Age: 69
Discharge: HOME | End: 2024-10-31
Attending: UROLOGY | Admitting: UROLOGY
Payer: MEDICARE

## 2024-10-31 VITALS
RESPIRATION RATE: 18 BRPM | DIASTOLIC BLOOD PRESSURE: 65 MMHG | HEIGHT: 74 IN | WEIGHT: 288 LBS | TEMPERATURE: 97.7 F | OXYGEN SATURATION: 95 % | SYSTOLIC BLOOD PRESSURE: 116 MMHG | HEART RATE: 77 BPM | BODY MASS INDEX: 36.96 KG/M2

## 2024-10-31 LAB
GLUCOSE BLD STRIP.AUTO-MCNC: 127 MG/DL (ref 65–100)
GLUCOSE BLD STRIP.AUTO-MCNC: 157 MG/DL (ref 65–100)
PERFORMED BY:: ABNORMAL
POTASSIUM BLD-SCNC: 4.4 MMOL/L (ref 3.5–5.5)

## 2024-10-31 PROCEDURE — 6360000002 HC RX W HCPCS: Performed by: UROLOGY

## 2024-10-31 PROCEDURE — 7100000000 HC PACU RECOVERY - FIRST 15 MIN: Performed by: UROLOGY

## 2024-10-31 PROCEDURE — 7100000001 HC PACU RECOVERY - ADDTL 15 MIN: Performed by: UROLOGY

## 2024-10-31 PROCEDURE — 2500000003 HC RX 250 WO HCPCS

## 2024-10-31 PROCEDURE — 3700000001 HC ADD 15 MINUTES (ANESTHESIA): Performed by: UROLOGY

## 2024-10-31 PROCEDURE — 7100000010 HC PHASE II RECOVERY - FIRST 15 MIN: Performed by: UROLOGY

## 2024-10-31 PROCEDURE — 3600000002 HC SURGERY LEVEL 2 BASE: Performed by: UROLOGY

## 2024-10-31 PROCEDURE — 2580000003 HC RX 258: Performed by: UROLOGY

## 2024-10-31 PROCEDURE — 80047 BASIC METABLC PNL IONIZED CA: CPT

## 2024-10-31 PROCEDURE — 54161 CIRCUM 28 DAYS OR OLDER: CPT | Performed by: UROLOGY

## 2024-10-31 PROCEDURE — 3700000000 HC ANESTHESIA ATTENDED CARE: Performed by: UROLOGY

## 2024-10-31 PROCEDURE — 7100000011 HC PHASE II RECOVERY - ADDTL 15 MIN: Performed by: UROLOGY

## 2024-10-31 PROCEDURE — 82962 GLUCOSE BLOOD TEST: CPT

## 2024-10-31 PROCEDURE — 6360000002 HC RX W HCPCS

## 2024-10-31 PROCEDURE — 2709999900 HC NON-CHARGEABLE SUPPLY: Performed by: UROLOGY

## 2024-10-31 PROCEDURE — 3600000012 HC SURGERY LEVEL 2 ADDTL 15MIN: Performed by: UROLOGY

## 2024-10-31 RX ORDER — HYDROMORPHONE HYDROCHLORIDE 1 MG/ML
0.5 INJECTION, SOLUTION INTRAMUSCULAR; INTRAVENOUS; SUBCUTANEOUS EVERY 5 MIN PRN
Status: DISCONTINUED | OUTPATIENT
Start: 2024-10-31 | End: 2024-10-31 | Stop reason: HOSPADM

## 2024-10-31 RX ORDER — DEXTROSE MONOHYDRATE 100 MG/ML
INJECTION, SOLUTION INTRAVENOUS CONTINUOUS PRN
Status: DISCONTINUED | OUTPATIENT
Start: 2024-10-31 | End: 2024-10-31 | Stop reason: HOSPADM

## 2024-10-31 RX ORDER — SODIUM CHLORIDE, SODIUM LACTATE, POTASSIUM CHLORIDE, CALCIUM CHLORIDE 600; 310; 30; 20 MG/100ML; MG/100ML; MG/100ML; MG/100ML
INJECTION, SOLUTION INTRAVENOUS CONTINUOUS
Status: DISCONTINUED | OUTPATIENT
Start: 2024-10-31 | End: 2024-10-31 | Stop reason: HOSPADM

## 2024-10-31 RX ORDER — DIPHENHYDRAMINE HYDROCHLORIDE 50 MG/ML
12.5 INJECTION INTRAMUSCULAR; INTRAVENOUS
Status: DISCONTINUED | OUTPATIENT
Start: 2024-10-31 | End: 2024-10-31 | Stop reason: HOSPADM

## 2024-10-31 RX ORDER — ONDANSETRON 2 MG/ML
INJECTION INTRAMUSCULAR; INTRAVENOUS
Status: DISCONTINUED | OUTPATIENT
Start: 2024-10-31 | End: 2024-10-31 | Stop reason: SDUPTHER

## 2024-10-31 RX ORDER — FAMOTIDINE 10 MG/ML
INJECTION, SOLUTION INTRAVENOUS
Status: DISCONTINUED | OUTPATIENT
Start: 2024-10-31 | End: 2024-10-31 | Stop reason: SDUPTHER

## 2024-10-31 RX ORDER — FENTANYL CITRATE 0.05 MG/ML
50 INJECTION, SOLUTION INTRAMUSCULAR; INTRAVENOUS EVERY 5 MIN PRN
Status: DISCONTINUED | OUTPATIENT
Start: 2024-10-31 | End: 2024-10-31 | Stop reason: HOSPADM

## 2024-10-31 RX ORDER — OXYCODONE HYDROCHLORIDE 5 MG/1
10 TABLET ORAL PRN
Status: DISCONTINUED | OUTPATIENT
Start: 2024-10-31 | End: 2024-10-31 | Stop reason: HOSPADM

## 2024-10-31 RX ORDER — PROPOFOL 10 MG/ML
INJECTION, EMULSION INTRAVENOUS
Status: DISCONTINUED | OUTPATIENT
Start: 2024-10-31 | End: 2024-10-31 | Stop reason: SDUPTHER

## 2024-10-31 RX ORDER — GLUCAGON 1 MG/ML
1 KIT INJECTION PRN
Status: DISCONTINUED | OUTPATIENT
Start: 2024-10-31 | End: 2024-10-31 | Stop reason: HOSPADM

## 2024-10-31 RX ORDER — SODIUM CHLORIDE 9 MG/ML
INJECTION, SOLUTION INTRAVENOUS PRN
Status: DISCONTINUED | OUTPATIENT
Start: 2024-10-31 | End: 2024-10-31 | Stop reason: HOSPADM

## 2024-10-31 RX ORDER — HYDRALAZINE HYDROCHLORIDE 20 MG/ML
10 INJECTION INTRAMUSCULAR; INTRAVENOUS
Status: DISCONTINUED | OUTPATIENT
Start: 2024-10-31 | End: 2024-10-31 | Stop reason: HOSPADM

## 2024-10-31 RX ORDER — DEXAMETHASONE SODIUM PHOSPHATE 4 MG/ML
INJECTION, SOLUTION INTRA-ARTICULAR; INTRALESIONAL; INTRAMUSCULAR; INTRAVENOUS; SOFT TISSUE
Status: DISCONTINUED | OUTPATIENT
Start: 2024-10-31 | End: 2024-10-31 | Stop reason: SDUPTHER

## 2024-10-31 RX ORDER — MIDAZOLAM HYDROCHLORIDE 1 MG/ML
INJECTION, SOLUTION INTRAMUSCULAR; INTRAVENOUS
Status: DISCONTINUED | OUTPATIENT
Start: 2024-10-31 | End: 2024-10-31 | Stop reason: SDUPTHER

## 2024-10-31 RX ORDER — LORAZEPAM 2 MG/ML
0.5 INJECTION INTRAMUSCULAR
Status: DISCONTINUED | OUTPATIENT
Start: 2024-10-31 | End: 2024-10-31 | Stop reason: HOSPADM

## 2024-10-31 RX ORDER — NALOXONE HYDROCHLORIDE 0.4 MG/ML
INJECTION, SOLUTION INTRAMUSCULAR; INTRAVENOUS; SUBCUTANEOUS PRN
Status: DISCONTINUED | OUTPATIENT
Start: 2024-10-31 | End: 2024-10-31 | Stop reason: HOSPADM

## 2024-10-31 RX ORDER — SODIUM CHLORIDE 0.9 % (FLUSH) 0.9 %
5-40 SYRINGE (ML) INJECTION PRN
Status: DISCONTINUED | OUTPATIENT
Start: 2024-10-31 | End: 2024-10-31 | Stop reason: HOSPADM

## 2024-10-31 RX ORDER — METOCLOPRAMIDE HYDROCHLORIDE 5 MG/ML
10 INJECTION INTRAMUSCULAR; INTRAVENOUS
Status: DISCONTINUED | OUTPATIENT
Start: 2024-10-31 | End: 2024-10-31 | Stop reason: HOSPADM

## 2024-10-31 RX ORDER — BUPIVACAINE HYDROCHLORIDE 2.5 MG/ML
INJECTION, SOLUTION EPIDURAL; INFILTRATION; INTRACAUDAL PRN
Status: DISCONTINUED | OUTPATIENT
Start: 2024-10-31 | End: 2024-10-31 | Stop reason: ALTCHOICE

## 2024-10-31 RX ORDER — PHENYLEPHRINE HCL IN 0.9% NACL 0.4MG/10ML
SYRINGE (ML) INTRAVENOUS
Status: DISCONTINUED | OUTPATIENT
Start: 2024-10-31 | End: 2024-10-31 | Stop reason: SDUPTHER

## 2024-10-31 RX ORDER — SODIUM CHLORIDE, SODIUM LACTATE, POTASSIUM CHLORIDE, CALCIUM CHLORIDE 600; 310; 30; 20 MG/100ML; MG/100ML; MG/100ML; MG/100ML
INJECTION, SOLUTION INTRAVENOUS ONCE
Status: DISCONTINUED | OUTPATIENT
Start: 2024-10-31 | End: 2024-10-31 | Stop reason: HOSPADM

## 2024-10-31 RX ORDER — ONDANSETRON 2 MG/ML
4 INJECTION INTRAMUSCULAR; INTRAVENOUS
Status: DISCONTINUED | OUTPATIENT
Start: 2024-10-31 | End: 2024-10-31 | Stop reason: HOSPADM

## 2024-10-31 RX ORDER — LIDOCAINE HYDROCHLORIDE 20 MG/ML
INJECTION, SOLUTION EPIDURAL; INFILTRATION; INTRACAUDAL; PERINEURAL
Status: DISCONTINUED | OUTPATIENT
Start: 2024-10-31 | End: 2024-10-31 | Stop reason: SDUPTHER

## 2024-10-31 RX ORDER — OXYCODONE HYDROCHLORIDE 5 MG/1
5 TABLET ORAL PRN
Status: DISCONTINUED | OUTPATIENT
Start: 2024-10-31 | End: 2024-10-31 | Stop reason: HOSPADM

## 2024-10-31 RX ORDER — SODIUM CHLORIDE 0.9 % (FLUSH) 0.9 %
5-40 SYRINGE (ML) INJECTION EVERY 12 HOURS SCHEDULED
Status: DISCONTINUED | OUTPATIENT
Start: 2024-10-31 | End: 2024-10-31 | Stop reason: HOSPADM

## 2024-10-31 RX ORDER — LIDOCAINE 4 G/G
1 PATCH TOPICAL AS NEEDED
Status: DISCONTINUED | OUTPATIENT
Start: 2024-10-31 | End: 2024-10-31 | Stop reason: HOSPADM

## 2024-10-31 RX ORDER — CEFAZOLIN SODIUM 1 G/3ML
INJECTION, POWDER, FOR SOLUTION INTRAMUSCULAR; INTRAVENOUS
Status: DISCONTINUED | OUTPATIENT
Start: 2024-10-31 | End: 2024-10-31 | Stop reason: SDUPTHER

## 2024-10-31 RX ORDER — LABETALOL HYDROCHLORIDE 5 MG/ML
10 INJECTION, SOLUTION INTRAVENOUS
Status: DISCONTINUED | OUTPATIENT
Start: 2024-10-31 | End: 2024-10-31 | Stop reason: HOSPADM

## 2024-10-31 RX ORDER — FENTANYL CITRATE 50 UG/ML
INJECTION, SOLUTION INTRAMUSCULAR; INTRAVENOUS
Status: DISCONTINUED | OUTPATIENT
Start: 2024-10-31 | End: 2024-10-31 | Stop reason: SDUPTHER

## 2024-10-31 RX ORDER — GLYCOPYRROLATE 0.2 MG/ML
INJECTION INTRAMUSCULAR; INTRAVENOUS
Status: DISCONTINUED | OUTPATIENT
Start: 2024-10-31 | End: 2024-10-31 | Stop reason: SDUPTHER

## 2024-10-31 RX ORDER — IPRATROPIUM BROMIDE AND ALBUTEROL SULFATE 2.5; .5 MG/3ML; MG/3ML
1 SOLUTION RESPIRATORY (INHALATION)
Status: DISCONTINUED | OUTPATIENT
Start: 2024-10-31 | End: 2024-10-31 | Stop reason: HOSPADM

## 2024-10-31 RX ORDER — MEPERIDINE HYDROCHLORIDE 25 MG/ML
12.5 INJECTION INTRAMUSCULAR; INTRAVENOUS; SUBCUTANEOUS EVERY 5 MIN PRN
Status: DISCONTINUED | OUTPATIENT
Start: 2024-10-31 | End: 2024-10-31 | Stop reason: HOSPADM

## 2024-10-31 RX ADMIN — FAMOTIDINE 20 MG: 10 INJECTION, SOLUTION INTRAVENOUS at 07:41

## 2024-10-31 RX ADMIN — Medication 200 MCG: at 08:30

## 2024-10-31 RX ADMIN — Medication 200 MCG: at 08:22

## 2024-10-31 RX ADMIN — PROPOFOL 200 MG: 10 INJECTION, EMULSION INTRAVENOUS at 07:47

## 2024-10-31 RX ADMIN — Medication 20 MG: at 07:50

## 2024-10-31 RX ADMIN — Medication 200 MCG: at 08:26

## 2024-10-31 RX ADMIN — FENTANYL CITRATE 25 MCG: 50 INJECTION INTRAMUSCULAR; INTRAVENOUS at 07:47

## 2024-10-31 RX ADMIN — DEXAMETHASONE SODIUM PHOSPHATE 8 MG: 4 INJECTION, SOLUTION INTRA-ARTICULAR; INTRALESIONAL; INTRAMUSCULAR; INTRAVENOUS; SOFT TISSUE at 07:58

## 2024-10-31 RX ADMIN — FENTANYL CITRATE 25 MCG: 50 INJECTION INTRAMUSCULAR; INTRAVENOUS at 08:26

## 2024-10-31 RX ADMIN — CEFAZOLIN 3 G: 1 INJECTION, POWDER, FOR SOLUTION INTRAMUSCULAR; INTRAVENOUS at 07:41

## 2024-10-31 RX ADMIN — ONDANSETRON 4 MG: 2 INJECTION INTRAMUSCULAR; INTRAVENOUS at 07:47

## 2024-10-31 RX ADMIN — MIDAZOLAM 2 MG: 1 INJECTION INTRAMUSCULAR; INTRAVENOUS at 07:41

## 2024-10-31 RX ADMIN — LIDOCAINE HYDROCHLORIDE 100 MG: 20 INJECTION, SOLUTION EPIDURAL; INFILTRATION; INTRACAUDAL; PERINEURAL at 07:47

## 2024-10-31 RX ADMIN — FENTANYL CITRATE 25 MCG: 50 INJECTION INTRAMUSCULAR; INTRAVENOUS at 08:00

## 2024-10-31 RX ADMIN — GLYCOPYRROLATE 0.2 MG: 0.2 INJECTION INTRAMUSCULAR; INTRAVENOUS at 07:41

## 2024-10-31 RX ADMIN — Medication 200 MCG: at 08:17

## 2024-10-31 RX ADMIN — Medication 150 MCG: at 08:13

## 2024-10-31 RX ADMIN — SODIUM CHLORIDE, POTASSIUM CHLORIDE, SODIUM LACTATE AND CALCIUM CHLORIDE: 600; 310; 30; 20 INJECTION, SOLUTION INTRAVENOUS at 06:46

## 2024-10-31 RX ADMIN — Medication 100 MCG: at 08:03

## 2024-10-31 RX ADMIN — Medication 150 MCG: at 08:07

## 2024-10-31 RX ADMIN — Medication 200 MCG: at 08:34

## 2024-10-31 RX ADMIN — FENTANYL CITRATE 25 MCG: 50 INJECTION INTRAMUSCULAR; INTRAVENOUS at 08:40

## 2024-10-31 RX ADMIN — Medication 100 MCG: at 08:10

## 2024-10-31 RX ADMIN — Medication 100 MCG: at 08:15

## 2024-10-31 RX ADMIN — FENTANYL CITRATE 25 MCG: 50 INJECTION INTRAMUSCULAR; INTRAVENOUS at 08:13

## 2024-10-31 ASSESSMENT — PAIN - FUNCTIONAL ASSESSMENT
PAIN_FUNCTIONAL_ASSESSMENT: 0-10

## 2024-10-31 NOTE — H&P
UROLOGY HISTORY AND PHYSICAL  Jonah Astorga MD  [phone] Office    Patient: Cam Levine MRN: [id number]  SSN: [id number]    YOB: 1955  Age: 69 y.o.  Sex: male          Date of Encounter:  October 31, 2024  Chief Complaint:  phimosis             History of Present Illness:  Patient is a 69 y.o. male here for surgery.    He has 3 months of phimosis.  He has a little burning or discomfort.  He is not very sexually active.  He does get nocturnal erections with his skin does not retract.            Past Medical History:  Allergies   Allergen Reactions    Cyclobenzaprine Other (See Comments)     Leg cramps    Oxycodone     Oxycodone-Acetaminophen Nausea Only      Prior to Admission medications    Medication Sig Start Date End Date Taking? Authorizing Provider   allopurinol (ZYLOPRIM) 300 MG tablet TAKE 1 TABLET BY MOUTH DAILY 10/30/24  Yes Kayley Dhaliwal MD   lisinopril-hydroCHLOROthiazide (PRINZIDE;ZESTORETIC) 20-12.5 MG per tablet Take 1 tablet by mouth daily   Yes Provider, MD Krysta   Dulaglutide (TRULICITY) 4.5 MG/0.5ML SOPN INJECT 4.5MG (0.5ML) UNDER THE SKIN ONCE A WEEK  Patient taking differently: Inject 4.5 mg into the skin once a week INJECT 4.5MG (0.5ML) UNDER THE SKIN ONCE A WEEK Sundays 10/16/24  Yes Kayley Dhaliwal MD   gabapentin (NEURONTIN) 300 MG capsule TAKE 1 CAPSULE BY MOUTH TWICE DAILY MAX  DAILY  AMOUNT  600MG  Patient taking differently: Take 1 capsule by mouth in the morning and at bedtime. TAKE 1 CAPSULE BY MOUTH TWICE DAILY MAX  DAILY  AMOUNT  600MG 10/7/24 11/7/24 Yes Kayley Dhaliwal MD   dapagliflozin (FARXIGA) 10 MG tablet Take 1 tablet by mouth every morning 8/12/24  Yes Kayley Dhaliwal MD   ezetimibe (ZETIA) 10 MG tablet Take 1 tablet by mouth daily 8/12/24  Yes Kayley Dhaliwal MD   glipiZIDE (GLUCOTROL) 10 MG tablet Take 1 tablet by mouth 2 times daily 8/12/24  Yes Kayley Dhaliwal MD   metFORMIN (GLUCOPHAGE) 1000 MG tablet Take 1 tablet by mouth 2 times daily  (with meals) 8/12/24  Yes Kayley Dhaliwal MD   metoprolol tartrate (LOPRESSOR) 50 MG tablet Take 1 tablet by mouth 2 times daily  Patient taking differently: Take 1 tablet by mouth daily 8/12/24  Yes Kayley Dhaliwal MD   pravastatin (PRAVACHOL) 40 MG tablet Take 1 tablet by mouth nightly 8/12/24  Yes Kayley Dhaliwal MD   verapamil (CALAN SR) 180 MG extended release tablet Take 1 tablet by mouth nightly 8/12/24  Yes Kayley Dhaliwal MD   diclofenac sodium (VOLTAREN) 1 % GEL Apply 2 g topically 4 times daily 6/14/24  Yes Rosario Pinedo,    acetaminophen (TYLENOL) 500 MG tablet Take 1 tablet by mouth every 6 hours as needed for Pain   Yes ProviderKrysta MD   aspirin 325 MG tablet Take 1 tablet by mouth daily 12/4/12  Yes Automatic Reconciliation, Ar   lisinopril (PRINIVIL;ZESTRIL) 20 MG tablet Take 1 tablet by mouth daily  Patient not taking: Reported on 10/23/2024 8/12/24   Kayley Dhaliwal MD   hydroCHLOROthiazide 12.5 MG capsule Take 1 capsule by mouth every morning  Patient not taking: Reported on 10/23/2024 8/12/24   Kayley Dhaliwal MD   triamcinolone (KENALOG) 0.1 % cream Apply topically 2 times daily to affected area x 7 days, foot  Patient not taking: Reported on 10/23/2024 8/12/24   Kayley Dhaliwal MD      PMHx:  has a past medical history of Diabetes (HCC), Hypercholesterolemia, and Hypertension.   PSurgHx:  has a past surgical history that includes orthopedic surgery (2004); orthopedic surgery; and Colonoscopy.  PSocHx:  reports that he quit smoking about 32 years ago. His smoking use included cigarettes. He started smoking about 62 years ago. He has a 60 pack-year smoking history. He has never used smokeless tobacco. He reports that he does not drink alcohol and does not use drugs.   FamHx:   Family History   Problem Relation Age of Onset    Diabetes Father     Hypertension Father      ROS:  Admission ROS by Jonah Astorga MD from 10/31/2024 were reviewed with the patient and changes (other than per HPI)

## 2024-10-31 NOTE — PROGRESS NOTES
Discharge instructions printed and provided to patient and patient's wife, Anitra with all questions answered in full. PIV x1 removed with cath tip intact. Pt VSS and no signs of distress noted. Pt tolerating liquids and solids with no issues. Dr. Astorga at bedside. Pt can return to work when he's comfortable per MD order. Pt ambulating within room with no issues. Pt wheeled down to main entrance accompanied by staff. Pt condition stable at time of discharge.

## 2024-10-31 NOTE — ANESTHESIA PRE PROCEDURE
Department of Anesthesiology  Preprocedure Note       Name:  Cam Levine   Age:  69 y.o.  :  1955                                          MRN:  [id number]         Date:  10/31/2024      Surgeon: Surgeon(s):  Jonah Astorga MD    Procedure: Procedure(s):  CIRCUMCISION    Medications prior to admission:   Prior to Admission medications    Medication Sig Start Date End Date Taking? Authorizing Provider   allopurinol (ZYLOPRIM) 300 MG tablet TAKE 1 TABLET BY MOUTH DAILY 10/30/24  Yes Kayley Dhaliwal MD   lisinopril-hydroCHLOROthiazide (PRINZIDE;ZESTORETIC) 20-12.5 MG per tablet Take 1 tablet by mouth daily   Yes Provider, MD Krysta   Dulaglutide (TRULICITY) 4.5 MG/0.5ML SOPN INJECT 4.5MG (0.5ML) UNDER THE SKIN ONCE A WEEK  Patient taking differently: Inject 4.5 mg into the skin once a week INJECT 4.5MG (0.5ML) UNDER THE SKIN ONCE A WEEK Sundays 10/16/24  Yes Kayley Dhaliwal MD   gabapentin (NEURONTIN) 300 MG capsule TAKE 1 CAPSULE BY MOUTH TWICE DAILY MAX  DAILY  AMOUNT  600MG  Patient taking differently: Take 1 capsule by mouth in the morning and at bedtime. TAKE 1 CAPSULE BY MOUTH TWICE DAILY MAX  DAILY  AMOUNT  600MG 10/7/24 11/7/24 Yes Kayley Dhaliwal MD   dapagliflozin (FARXIGA) 10 MG tablet Take 1 tablet by mouth every morning 24  Yes Kayley Dhaliwal MD   ezetimibe (ZETIA) 10 MG tablet Take 1 tablet by mouth daily 24  Yes Kayley Dhaliwal MD   glipiZIDE (GLUCOTROL) 10 MG tablet Take 1 tablet by mouth 2 times daily 24  Yes Kayley Dhaliwal MD   metFORMIN (GLUCOPHAGE) 1000 MG tablet Take 1 tablet by mouth 2 times daily (with meals) 24  Yes Kayley Dhaliwal MD   metoprolol tartrate (LOPRESSOR) 50 MG tablet Take 1 tablet by mouth 2 times daily  Patient taking differently: Take 1 tablet by mouth daily 24  Yes Kayley Dhaliwal MD   pravastatin (PRAVACHOL) 40 MG tablet Take 1 tablet by mouth nightly 24  Yes Kayley Dhaliwal MD   verapamil (CALAN SR) 180 MG extended release tablet Take 1 tablet by

## 2024-10-31 NOTE — OP NOTE
Operative Note      Patient: Cam Levine  YOB: 1955  MRN: [id number]    Date of Procedure: 10/31/2024    Pre-Op Diagnosis Codes:      * Phimosis [N47.1]    Post-Op Diagnosis: Same       Procedure(s):  CIRCUMCISION    Surgeon(s):  Jonah Astorga MD    Assistant:   Surgical Assistant: Ean De La Fuente    Anesthesia: General    Estimated Blood Loss (mL): Minimal    Complications: None    Specimens:   * No specimens in log *    Implants:  * No implants in log *      Drains: * No LDAs found *    Findings:  Infection Present At Time Of Surgery (PATOS) (choose all levels that have infection present):  No infection present  Other Findings: Tight phimosis    Detailed Description of Procedure:   The patient was given sufficient anesthesia and placed supine on the procedure table.  His genitals were prepped and draped in the usual sterile manner.    The shaft skin collar was marked with a marking pen.  0.25% Marcaine was infiltrated in the penile shaft.  Additional Betadine prep was placed under the prepuce.  Hemostats were applied to the dorsal aspect of the redundant foreskin and the dorsal skin was crushed and incised.  The foreskin was able to be retracted.  Adhesions were bluntly taken down.  The mucosal skin collar was marked with a marking pen.    Using a #15 blade the shaft skin collar was circumferentially incised.  The mucosal skin collar was also circumferentially incised.  The 2 incisions were joined dorsally.  The redundant foreskin was carefully excised with cautery.  Hemostasis was meticulously performed with cautery.  The mucosal and skin collars were approximated with 3-0 chromic suture in the 4 quadrants, dorsally, ventrally, and laterally on both sides.  The mucosal and shaft skin incisions were approximated with running 3-0 and 4-0 chromic suture.  The cosmetic appearance was excellent.  Xeroform gauze, Maribeth and Coban dressing were applied.    The patient tolerated the procedure well and was

## 2024-11-08 ENCOUNTER — OFFICE VISIT (OUTPATIENT)
Age: 69
End: 2024-11-08

## 2024-11-08 VITALS
SYSTOLIC BLOOD PRESSURE: 127 MMHG | WEIGHT: 288 LBS | BODY MASS INDEX: 36.96 KG/M2 | HEART RATE: 83 BPM | DIASTOLIC BLOOD PRESSURE: 72 MMHG | HEIGHT: 74 IN

## 2024-11-08 DIAGNOSIS — N47.1 PHIMOSIS: Primary | ICD-10-CM

## 2024-11-08 PROCEDURE — 99024 POSTOP FOLLOW-UP VISIT: CPT | Performed by: UROLOGY

## 2024-11-08 RX ORDER — CEPHALEXIN 500 MG/1
500 CAPSULE ORAL 4 TIMES DAILY
Qty: 10 CAPSULE | Refills: 0 | Status: SHIPPED | OUTPATIENT
Start: 2024-11-08

## 2024-11-08 NOTE — PROGRESS NOTES
Chief Complaint   Patient presents with    Post-Op Check     circumcision     1. Have you been to the ER, urgent care clinic since your last visit?  Hospitalized since your last visit?No    2. Have you seen or consulted any other health care providers outside of the [facility] since your last visit?  Include any pap smears or colon screening. No  /72   Pulse 83   Ht 1.88 m (6' 2\")   Wt 130.6 kg (288 lb)   BMI 36.98 kg/m²

## 2024-11-08 NOTE — PROGRESS NOTES
HISTORY OF PRESENT ILLNESS  Cam Levine is a 69 y.o. male.   has a past medical history of Diabetes (HCC), Hypercholesterolemia, and Hypertension.  has a past surgical history that includes orthopedic surgery (2004); orthopedic surgery; Colonoscopy; and Circumcision (N/A, 10/31/2024).  Chief Complaint   Patient presents with    Post-Op Check     circumcision     HPI    1. Phimosis  Overview:  He is s/p circumcision on 10/31/24.  Assessment & Plan:  He is a little sore.  He is happy with improved urine stream.  He has some persistent suture as expected. I advised him to soak 15 min per day.  He can use vaseline or antibiotic ointment.  He has a little more erythema than expected.  I will put him on a 5 day course of keflex.        Allergies   Allergen Reactions    Cyclobenzaprine Other (See Comments)     Leg cramps    Oxycodone     Oxycodone-Acetaminophen Nausea Only    Pollen Extract       Prior to Admission medications    Medication Sig Start Date End Date Taking? Authorizing Provider   cephALEXin (KEFLEX) 500 MG capsule Take 1 capsule by mouth 4 times daily 11/8/24  Yes Jonah Astorga MD   allopurinol (ZYLOPRIM) 300 MG tablet TAKE 1 TABLET BY MOUTH DAILY 10/30/24  Yes Kayley Dhaliwal MD   lisinopril-hydroCHLOROthiazide (PRINZIDE;ZESTORETIC) 20-12.5 MG per tablet Take 1 tablet by mouth daily   Yes ProviderKrysta MD   Dulaglutide (TRULICITY) 4.5 MG/0.5ML SOPN INJECT 4.5MG (0.5ML) UNDER THE SKIN ONCE A WEEK  Patient taking differently: Inject 4.5 mg into the skin once a week INJECT 4.5MG (0.5ML) UNDER THE SKIN ONCE A WEEK Sundays 10/16/24  Yes Kayley Dhaliwal MD   gabapentin (NEURONTIN) 300 MG capsule TAKE 1 CAPSULE BY MOUTH TWICE DAILY MAX  DAILY  AMOUNT  600MG  Patient taking differently: Take 1 capsule by mouth in the morning and at bedtime. TAKE 1 CAPSULE BY MOUTH TWICE DAILY MAX  DAILY  AMOUNT  600MG 10/7/24 11/8/24 Yes Kayley Dhaliwal MD   dapagliflozin (FARXIGA) 10 MG tablet Take 1 tablet by mouth every

## 2024-11-08 NOTE — ASSESSMENT & PLAN NOTE
He is a little sore.  He has some persistent suture as expected. I advised him to soak 15 min per day.  He can use vaseline or antibiotic ointment.  He has a little more erythema than expected.  I will put him on a 5 day course of keflex.

## 2024-11-11 DIAGNOSIS — M54.16 RADICULOPATHY, LUMBAR REGION: ICD-10-CM

## 2024-11-11 DIAGNOSIS — M25.551 RIGHT HIP PAIN: ICD-10-CM

## 2024-11-12 ENCOUNTER — OFFICE VISIT (OUTPATIENT)
Facility: CLINIC | Age: 69
End: 2024-11-12

## 2024-11-12 VITALS
OXYGEN SATURATION: 93 % | WEIGHT: 289 LBS | TEMPERATURE: 97.7 F | HEART RATE: 81 BPM | BODY MASS INDEX: 37.09 KG/M2 | HEIGHT: 74 IN | RESPIRATION RATE: 16 BRPM | DIASTOLIC BLOOD PRESSURE: 73 MMHG | SYSTOLIC BLOOD PRESSURE: 115 MMHG

## 2024-11-12 DIAGNOSIS — E11.22 TYPE 2 DIABETES MELLITUS WITH STAGE 3A CHRONIC KIDNEY DISEASE, WITHOUT LONG-TERM CURRENT USE OF INSULIN (HCC): Primary | ICD-10-CM

## 2024-11-12 DIAGNOSIS — M10.9 ACUTE GOUT OF MULTIPLE SITES, UNSPECIFIED CAUSE: ICD-10-CM

## 2024-11-12 DIAGNOSIS — E78.2 MIXED HYPERLIPIDEMIA: ICD-10-CM

## 2024-11-12 DIAGNOSIS — M54.16 RADICULOPATHY, LUMBAR REGION: ICD-10-CM

## 2024-11-12 DIAGNOSIS — I10 PRIMARY HYPERTENSION: ICD-10-CM

## 2024-11-12 DIAGNOSIS — N18.31 STAGE 3A CHRONIC KIDNEY DISEASE (HCC): ICD-10-CM

## 2024-11-12 DIAGNOSIS — L20.9 ATOPIC DERMATITIS, UNSPECIFIED TYPE: ICD-10-CM

## 2024-11-12 DIAGNOSIS — N18.31 TYPE 2 DIABETES MELLITUS WITH STAGE 3A CHRONIC KIDNEY DISEASE, WITHOUT LONG-TERM CURRENT USE OF INSULIN (HCC): Primary | ICD-10-CM

## 2024-11-12 PROBLEM — N47.1 PHIMOSIS: Status: RESOLVED | Noted: 2024-09-10 | Resolved: 2024-11-12

## 2024-11-12 LAB — HBA1C MFR BLD: 7.4 %

## 2024-11-12 RX ORDER — PRAVASTATIN SODIUM 40 MG
40 TABLET ORAL NIGHTLY
Qty: 90 TABLET | Refills: 1 | Status: SHIPPED | OUTPATIENT
Start: 2024-11-12

## 2024-11-12 RX ORDER — ALLOPURINOL 300 MG/1
300 TABLET ORAL DAILY
Qty: 90 TABLET | Refills: 1 | Status: SHIPPED | OUTPATIENT
Start: 2024-11-12

## 2024-11-12 RX ORDER — GLIPIZIDE 5 MG/1
5 TABLET ORAL 2 TIMES DAILY
Qty: 180 TABLET | Refills: 1 | Status: SHIPPED | OUTPATIENT
Start: 2024-11-12

## 2024-11-12 RX ORDER — MOMETASONE FUROATE 1 MG/G
OINTMENT TOPICAL
Qty: 15 G | Refills: 3 | Status: SHIPPED | OUTPATIENT
Start: 2024-11-12

## 2024-11-12 RX ORDER — GABAPENTIN 300 MG/1
CAPSULE ORAL
Qty: 60 CAPSULE | Refills: 2 | Status: SHIPPED | OUTPATIENT
Start: 2024-11-12 | End: 2025-02-12

## 2024-11-12 ASSESSMENT — ENCOUNTER SYMPTOMS
SHORTNESS OF BREATH: 0
COUGH: 0
BACK PAIN: 1

## 2024-11-12 NOTE — ASSESSMENT & PLAN NOTE
Lab Results   Component Value Date    LDL 39.2 08/12/2024         Orders:    pravastatin (PRAVACHOL) 40 MG tablet; Take 1 tablet by mouth nightly

## 2024-11-12 NOTE — PATIENT INSTRUCTIONS
RECOMMENDATIONS given include: Recommended BID use of moisturizer. Topical steroid therapy --only use this for up to two weeks at a time daily and then take at least a month off before using it again to avoid and steroid induced skin changes.    Reduce glipizide to 5 mg twice daily.

## 2024-11-12 NOTE — ASSESSMENT & PLAN NOTE
Near goal, the following changes in treatment are made reduce glipizide, monitor for lows, if occur will stop glipizide    Orders:    AMB POC HEMOGLOBIN A1C    Microalbumin / Creatinine Urine Ratio; Future    glipiZIDE (GLUCOTROL) 5 MG tablet; Take 1 tablet by mouth 2 times daily    Microalbumin / Creatinine Urine Ratio

## 2024-11-12 NOTE — PROGRESS NOTES
Chief Complaint   Patient presents with    3 Month Follow-Up        \"Have you been to the ER, urgent care clinic since your last visit?  Hospitalized since your last visit?\"    NO    “Have you seen or consulted any other health care providers outside of [facility] since your last visit?”    NO        “Have you had a colorectal cancer screening such as a colonoscopy/FIT/Cologuard?    NO    Date of last Colonoscopy: 12/26/2007  No cologuard on file  Date of last FIT: 7/9/2023   No flexible sigmoidoscopy on file         Click Here for Release of Records Request     Health Maintenance Due   Topic Date Due    Pneumococcal 65+ years Vaccine (1 of 2 - PCV) Never done    Respiratory Syncytial Virus (RSV) Pregnant or age 60 yrs+ (1 - 1-dose 60+ series) Never done    Diabetic Alb to Cr ratio (uACR) test  06/13/2024    Shingles vaccine (2 of 2) 06/23/2024    Colorectal Cancer Screen  07/09/2024    Diabetic retinal exam  07/24/2024    COVID-19 Vaccine (3 - 2023-24 season) 09/01/2024       
vaccines today  Return in about 3 months (around 2/12/2025).   I have discussed the diagnosis with the patient and the intended plan as seen in the above orders.  Social history, medical history, and labs were reviewed.  The patient has received an after-visit summary and questions were answered concerning future plans.  I have discussed medication side effects and warnings with the patient as well. Patient verbalized understanding and accepts plan & risks.        Kayley Dhaliwal MD  [facility]  11/12/24

## 2024-11-12 NOTE — ASSESSMENT & PLAN NOTE
lab results and schedule of future lab studies reviewed with patient, and reviewed medications and side effects in detail

## 2024-11-13 LAB
CREAT UR-MCNC: 123 MG/DL
MICROALBUMIN UR-MCNC: 3.25 MG/DL
MICROALBUMIN/CREAT UR-RTO: 26 MG/G (ref 0–30)
SPECIMEN HOLD: NORMAL

## 2024-11-15 ENCOUNTER — TELEPHONE (OUTPATIENT)
Facility: CLINIC | Age: 69
End: 2024-11-15

## 2024-11-15 NOTE — TELEPHONE ENCOUNTER
Spoke to patient regarding re-enrollment for [facility] (Trulicity) and AZ&me (Farxiga). Forms placed at  for patient .

## 2024-11-16 NOTE — ANESTHESIA POSTPROCEDURE EVALUATION
Department of Anesthesiology  Postprocedure Note    Patient: Cam Levine  MRN: [id number]  YOB: 1955    Procedure Summary       Date: 10/31/24 Room / Location: [facility] MAIN OR 03 / [facility] MAIN OR    Anesthesia Start: 0741 Anesthesia Stop: 0850    Procedure: CIRCUMCISION (Penis) Diagnosis:       Phimosis      (Phimosis [N47.1])    Surgeons: Jonah Astorga MD Responsible Provider: Hermann Funez MD    Anesthesia Type: General ASA Status: 3            Anesthesia Type: General    Christian Phase I: Christian Score: 10    Christian Phase II: Christian Score: 10    Anesthesia Post Evaluation    Patient location during evaluation: PACU  Patient participation: complete - patient cannot participate  Level of consciousness: awake  Pain score: 0  Airway patency: patent  Nausea & Vomiting: no nausea and no vomiting  Cardiovascular status: hemodynamically stable  Respiratory status: acceptable  Hydration status: stable  Multimodal analgesia pain management approach        No notable events documented.

## 2024-11-29 ENCOUNTER — OFFICE VISIT (OUTPATIENT)
Facility: CLINIC | Age: 69
End: 2024-11-29
Payer: MEDICARE

## 2024-11-29 VITALS
WEIGHT: 289 LBS | OXYGEN SATURATION: 93 % | HEART RATE: 82 BPM | BODY MASS INDEX: 37.09 KG/M2 | DIASTOLIC BLOOD PRESSURE: 74 MMHG | TEMPERATURE: 98.7 F | HEIGHT: 74 IN | SYSTOLIC BLOOD PRESSURE: 126 MMHG | RESPIRATION RATE: 16 BRPM

## 2024-11-29 DIAGNOSIS — L30.9 DERMATITIS: Primary | ICD-10-CM

## 2024-11-29 PROCEDURE — 1123F ACP DISCUSS/DSCN MKR DOCD: CPT | Performed by: FAMILY MEDICINE

## 2024-11-29 PROCEDURE — 3074F SYST BP LT 130 MM HG: CPT | Performed by: FAMILY MEDICINE

## 2024-11-29 PROCEDURE — 99213 OFFICE O/P EST LOW 20 MIN: CPT | Performed by: FAMILY MEDICINE

## 2024-11-29 PROCEDURE — 3078F DIAST BP <80 MM HG: CPT | Performed by: FAMILY MEDICINE

## 2024-11-29 RX ORDER — HYDROXYZINE HYDROCHLORIDE 25 MG/1
25 TABLET, FILM COATED ORAL EVERY 8 HOURS PRN
Qty: 30 TABLET | Refills: 1 | Status: SHIPPED | OUTPATIENT
Start: 2024-11-29

## 2024-11-29 RX ORDER — KETOCONAZOLE 20 MG/G
CREAM TOPICAL
Qty: 30 G | Refills: 1 | Status: SHIPPED | OUTPATIENT
Start: 2024-11-29

## 2024-11-29 NOTE — PROGRESS NOTES
\"Have you been to the ER, urgent care clinic since your last visit?  Hospitalized since your last visit?\"    NO    “Have you seen or consulted any other health care providers outside of [facility] since your last visit?”    NO        “Have you had a colorectal cancer screening such as a colonoscopy/FIT/Cologuard?    NO    Date of last Colonoscopy: 12/26/2007  No cologuard on file  Date of last FIT: 7/9/2023   No flexible sigmoidoscopy on file         Click Here for Release of Records Request     Health Maintenance Due   Topic Date Due    Pneumococcal 65+ years Vaccine (1 of 2 - PCV) Never done    Shingles vaccine (2 of 2) 06/23/2024    Colorectal Cancer Screen  07/09/2024    Diabetic retinal exam  07/24/2024    COVID-19 Vaccine (3 - 2023-24 season) 09/01/2024

## 2024-12-31 ENCOUNTER — TELEPHONE (OUTPATIENT)
Facility: CLINIC | Age: 69
End: 2024-12-31

## 2024-12-31 ENCOUNTER — OFFICE VISIT (OUTPATIENT)
Facility: CLINIC | Age: 69
End: 2024-12-31
Payer: MEDICARE

## 2024-12-31 VITALS
SYSTOLIC BLOOD PRESSURE: 123 MMHG | OXYGEN SATURATION: 93 % | BODY MASS INDEX: 37.49 KG/M2 | HEART RATE: 87 BPM | TEMPERATURE: 98.6 F | RESPIRATION RATE: 16 BRPM | DIASTOLIC BLOOD PRESSURE: 77 MMHG | WEIGHT: 292 LBS

## 2024-12-31 DIAGNOSIS — J40 BRONCHITIS: Primary | ICD-10-CM

## 2024-12-31 DIAGNOSIS — J01.90 ACUTE NON-RECURRENT SINUSITIS, UNSPECIFIED LOCATION: ICD-10-CM

## 2024-12-31 PROCEDURE — 1160F RVW MEDS BY RX/DR IN RCRD: CPT | Performed by: FAMILY MEDICINE

## 2024-12-31 PROCEDURE — 3074F SYST BP LT 130 MM HG: CPT | Performed by: FAMILY MEDICINE

## 2024-12-31 PROCEDURE — 99213 OFFICE O/P EST LOW 20 MIN: CPT | Performed by: FAMILY MEDICINE

## 2024-12-31 PROCEDURE — 1159F MED LIST DOCD IN RCRD: CPT | Performed by: FAMILY MEDICINE

## 2024-12-31 PROCEDURE — 3078F DIAST BP <80 MM HG: CPT | Performed by: FAMILY MEDICINE

## 2024-12-31 PROCEDURE — 1123F ACP DISCUSS/DSCN MKR DOCD: CPT | Performed by: FAMILY MEDICINE

## 2024-12-31 RX ORDER — DOXYCYCLINE HYCLATE 100 MG
100 TABLET ORAL 2 TIMES DAILY
Qty: 14 TABLET | Refills: 0 | Status: SHIPPED | OUTPATIENT
Start: 2024-12-31 | End: 2025-01-07

## 2024-12-31 SDOH — ECONOMIC STABILITY: FOOD INSECURITY: WITHIN THE PAST 12 MONTHS, YOU WORRIED THAT YOUR FOOD WOULD RUN OUT BEFORE YOU GOT MONEY TO BUY MORE.: NEVER TRUE

## 2024-12-31 SDOH — ECONOMIC STABILITY: FOOD INSECURITY: WITHIN THE PAST 12 MONTHS, THE FOOD YOU BOUGHT JUST DIDN'T LAST AND YOU DIDN'T HAVE MONEY TO GET MORE.: NEVER TRUE

## 2024-12-31 SDOH — ECONOMIC STABILITY: INCOME INSECURITY: HOW HARD IS IT FOR YOU TO PAY FOR THE VERY BASICS LIKE FOOD, HOUSING, MEDICAL CARE, AND HEATING?: NOT HARD AT ALL

## 2024-12-31 NOTE — TELEPHONE ENCOUNTER
Coughing, runny nose, headaches- currently taking umberto seltzer, dayquil, and nyquil.  Has had the symptoms for a week. Patient would like to know if anything can be sent to pharmacy.  I informed patient that an appt may be required.  Please call Marine Zee (wife) [phone].

## 2024-12-31 NOTE — PROGRESS NOTES
Chief Complaint   Patient presents with    Nasal Congestion     Dayquild and cold plus med, sneezing, coughing up phlegm     Cough     Chest congestion with productive cough, fatigue - all symptoms starting x2 weeks ago         \"Have you been to the ER, urgent care clinic since your last visit?  Hospitalized since your last visit?\"    NO    “Have you seen or consulted any other health care providers outside of [facility] since your last visit?”    NO        “Have you had a colorectal cancer screening such as a colonoscopy/FIT/Cologuard?    NO    Date of last Colonoscopy: 12/26/2007  No cologuard on file  Date of last FIT: 7/9/2023   No flexible sigmoidoscopy on file         Click Here for Release of Records Request     Health Maintenance Due   Topic Date Due    Pneumococcal 65+ years Vaccine (1 of 2 - PCV) Never done    Shingles vaccine (2 of 2) 06/23/2024    Colorectal Cancer Screen  07/09/2024    Diabetic retinal exam  07/24/2024    COVID-19 Vaccine (3 - 2023-24 season) 09/01/2024       
rhinorrea  Respiratory:  cough       Physical Exam:  /77   Pulse 87   Temp 98.6 °F (37 °C) (Infrared)   Resp 16   Wt 132.5 kg (292 lb)   SpO2 93%   BMI 37.49 kg/m²   General: Alert and oriented, in no acute distress.  Responds to all questions appropriately.  SKIN: No rash.  Normal color.  EYES: Conjunctiva are clear  EARS: External normal, canals clear, tympanic membranes normal.  OROPHARYNX: Slight tonsil  erythema, no exudate.    LUNGS: Respirations unlabored; coarse BS bilaterally, no wheeze, rales or rhonchi.   CARDIOVASCULAR: Regular, rate, and rhythm without murmurs, gallops or rubs.      Assessment:   Diagnosis Orders   1. Bronchitis  doxycycline hyclate (VIBRA-TABS) 100 MG tablet      2. Acute non-recurrent sinusitis, unspecified location  doxycycline hyclate (VIBRA-TABS) 100 MG tablet          Plan:  Discharge instructions:  Combination cough and cold medicine such as Mucinex otc  RECOMMENDATIONS given include: Symptom care/Env changes/Rest/Push clears constantly. F/U with MD if symptoms worsen or fail to improve and resolve as anticipated.      If you get suddenly worse, go to the nearest hospital Emergency Room    I have discussed the diagnosis with the patient and the intended plan as seen in the above orders.  Social history, medical history, and labs were reviewed.  The patient has received an after-visit summary and questions were answered concerning future plans.  I have discussed medication side effects and warnings with the patient as well. Patient/guardian verbalized understanding and accepts plan & risks.       Kayley Dhaliwal MD

## 2025-01-07 DIAGNOSIS — N18.31 TYPE 2 DIABETES MELLITUS WITH STAGE 3A CHRONIC KIDNEY DISEASE, WITHOUT LONG-TERM CURRENT USE OF INSULIN (HCC): ICD-10-CM

## 2025-01-07 DIAGNOSIS — E11.22 TYPE 2 DIABETES MELLITUS WITH STAGE 3A CHRONIC KIDNEY DISEASE, WITHOUT LONG-TERM CURRENT USE OF INSULIN (HCC): ICD-10-CM

## 2025-01-07 RX ORDER — DAPAGLIFLOZIN 10 MG/1
10 TABLET, FILM COATED ORAL EVERY MORNING
Qty: 90 TABLET | Refills: 3 | Status: SHIPPED | OUTPATIENT
Start: 2025-01-07

## 2025-01-27 RX ORDER — HYDROCHLOROTHIAZIDE 12.5 MG/1
CAPSULE ORAL
Qty: 90 CAPSULE | Refills: 1 | Status: SHIPPED | OUTPATIENT
Start: 2025-01-27

## 2025-01-31 RX ORDER — LISINOPRIL 20 MG/1
20 TABLET ORAL DAILY
Qty: 90 TABLET | Refills: 0 | Status: SHIPPED | OUTPATIENT
Start: 2025-01-31

## 2025-02-03 ENCOUNTER — OFFICE VISIT (OUTPATIENT)
Facility: CLINIC | Age: 70
End: 2025-02-03
Payer: MEDICARE

## 2025-02-03 VITALS
DIASTOLIC BLOOD PRESSURE: 74 MMHG | RESPIRATION RATE: 16 BRPM | SYSTOLIC BLOOD PRESSURE: 130 MMHG | BODY MASS INDEX: 37.36 KG/M2 | OXYGEN SATURATION: 95 % | HEART RATE: 83 BPM | TEMPERATURE: 98.1 F | WEIGHT: 291 LBS

## 2025-02-03 DIAGNOSIS — Z79.899 LONG TERM USE OF DRUG: Primary | ICD-10-CM

## 2025-02-03 DIAGNOSIS — I10 PRIMARY HYPERTENSION: ICD-10-CM

## 2025-02-03 DIAGNOSIS — E11.22 TYPE 2 DIABETES MELLITUS WITH STAGE 3A CHRONIC KIDNEY DISEASE, WITHOUT LONG-TERM CURRENT USE OF INSULIN (HCC): ICD-10-CM

## 2025-02-03 DIAGNOSIS — E78.2 MIXED HYPERLIPIDEMIA: ICD-10-CM

## 2025-02-03 DIAGNOSIS — N18.31 TYPE 2 DIABETES MELLITUS WITH STAGE 3A CHRONIC KIDNEY DISEASE, WITHOUT LONG-TERM CURRENT USE OF INSULIN (HCC): ICD-10-CM

## 2025-02-03 DIAGNOSIS — M54.16 RADICULOPATHY, LUMBAR REGION: ICD-10-CM

## 2025-02-03 DIAGNOSIS — M25.551 RIGHT HIP PAIN: ICD-10-CM

## 2025-02-03 DIAGNOSIS — E66.01 MORBID (SEVERE) OBESITY DUE TO EXCESS CALORIES: ICD-10-CM

## 2025-02-03 PROCEDURE — 1159F MED LIST DOCD IN RCRD: CPT | Performed by: FAMILY MEDICINE

## 2025-02-03 PROCEDURE — 3075F SYST BP GE 130 - 139MM HG: CPT | Performed by: FAMILY MEDICINE

## 2025-02-03 PROCEDURE — 99214 OFFICE O/P EST MOD 30 MIN: CPT | Performed by: FAMILY MEDICINE

## 2025-02-03 PROCEDURE — G2211 COMPLEX E/M VISIT ADD ON: HCPCS | Performed by: FAMILY MEDICINE

## 2025-02-03 PROCEDURE — 3078F DIAST BP <80 MM HG: CPT | Performed by: FAMILY MEDICINE

## 2025-02-03 PROCEDURE — 1160F RVW MEDS BY RX/DR IN RCRD: CPT | Performed by: FAMILY MEDICINE

## 2025-02-03 PROCEDURE — 1126F AMNT PAIN NOTED NONE PRSNT: CPT | Performed by: FAMILY MEDICINE

## 2025-02-03 PROCEDURE — 1123F ACP DISCUSS/DSCN MKR DOCD: CPT | Performed by: FAMILY MEDICINE

## 2025-02-03 RX ORDER — HYDROCHLOROTHIAZIDE 12.5 MG/1
12.5 CAPSULE ORAL EVERY MORNING
Qty: 90 CAPSULE | Refills: 1 | Status: SHIPPED | OUTPATIENT
Start: 2025-02-03

## 2025-02-03 RX ORDER — EZETIMIBE 10 MG/1
10 TABLET ORAL DAILY
Qty: 90 TABLET | Refills: 1 | Status: SHIPPED | OUTPATIENT
Start: 2025-02-03

## 2025-02-03 RX ORDER — GABAPENTIN 300 MG/1
CAPSULE ORAL
Qty: 60 CAPSULE | Refills: 2 | Status: SHIPPED | OUTPATIENT
Start: 2025-02-03 | End: 2025-05-06

## 2025-02-03 RX ORDER — PRAVASTATIN SODIUM 40 MG
40 TABLET ORAL NIGHTLY
Qty: 90 TABLET | Refills: 1 | Status: SHIPPED | OUTPATIENT
Start: 2025-02-03

## 2025-02-03 RX ORDER — DAPAGLIFLOZIN 10 MG/1
10 TABLET, FILM COATED ORAL EVERY MORNING
Qty: 90 TABLET | Refills: 3 | Status: SHIPPED | OUTPATIENT
Start: 2025-02-03

## 2025-02-03 RX ORDER — VERAPAMIL HYDROCHLORIDE 180 MG/1
180 TABLET, EXTENDED RELEASE ORAL NIGHTLY
Qty: 90 TABLET | Refills: 1 | Status: SHIPPED | OUTPATIENT
Start: 2025-02-03

## 2025-02-03 RX ORDER — LISINOPRIL 20 MG/1
20 TABLET ORAL DAILY
Qty: 90 TABLET | Refills: 0 | Status: SHIPPED | OUTPATIENT
Start: 2025-02-03

## 2025-02-03 SDOH — ECONOMIC STABILITY: FOOD INSECURITY: WITHIN THE PAST 12 MONTHS, YOU WORRIED THAT YOUR FOOD WOULD RUN OUT BEFORE YOU GOT MONEY TO BUY MORE.: NEVER TRUE

## 2025-02-03 SDOH — ECONOMIC STABILITY: FOOD INSECURITY: WITHIN THE PAST 12 MONTHS, THE FOOD YOU BOUGHT JUST DIDN'T LAST AND YOU DIDN'T HAVE MONEY TO GET MORE.: NEVER TRUE

## 2025-02-03 ASSESSMENT — ENCOUNTER SYMPTOMS
WHEEZING: 0
COUGH: 0
SHORTNESS OF BREATH: 0

## 2025-02-03 ASSESSMENT — PATIENT HEALTH QUESTIONNAIRE - PHQ9
SUM OF ALL RESPONSES TO PHQ QUESTIONS 1-9: 0
SUM OF ALL RESPONSES TO PHQ QUESTIONS 1-9: 0
1. LITTLE INTEREST OR PLEASURE IN DOING THINGS: NOT AT ALL
SUM OF ALL RESPONSES TO PHQ9 QUESTIONS 1 & 2: 0
2. FEELING DOWN, DEPRESSED OR HOPELESS: NOT AT ALL
SUM OF ALL RESPONSES TO PHQ QUESTIONS 1-9: 0
SUM OF ALL RESPONSES TO PHQ QUESTIONS 1-9: 0

## 2025-02-03 NOTE — ASSESSMENT & PLAN NOTE
Chronic, at goal (stable), continue current treatment plan    Orders:    metFORMIN (GLUCOPHAGE) 1000 MG tablet; Take 1 tablet by mouth 2 times daily (with meals)    dapagliflozin (FARXIGA) 10 MG tablet; Take 1 tablet by mouth every morning    Hemoglobin A1C; Future

## 2025-02-03 NOTE — ASSESSMENT & PLAN NOTE
Chronic, at goal (stable), continue current treatment plan  Lab Results   Component Value Date    LDL 39.2 08/12/2024         Orders:    pravastatin (PRAVACHOL) 40 MG tablet; Take 1 tablet by mouth nightly    ezetimibe (ZETIA) 10 MG tablet; Take 1 tablet by mouth daily

## 2025-02-03 NOTE — ASSESSMENT & PLAN NOTE
Orders:    gabapentin (NEURONTIN) 300 MG capsule; TAKE 1 CAPSULE BY MOUTH TWICE DAILY (MAX  DAILY  AMOUNT  600MG)

## 2025-02-03 NOTE — ASSESSMENT & PLAN NOTE
Chronic, at goal (stable), continue current treatment plan    Orders:    hydroCHLOROthiazide 12.5 MG capsule; Take 1 capsule by mouth every morning    verapamil (CALAN SR) 180 MG extended release tablet; Take 1 tablet by mouth nightly    lisinopril (PRINIVIL;ZESTRIL) 20 MG tablet; Take 1 tablet by mouth daily

## 2025-02-03 NOTE — ASSESSMENT & PLAN NOTE
Chronic, at goal (stable), continue current treatment plan    Orders:    gabapentin (NEURONTIN) 300 MG capsule; TAKE 1 CAPSULE BY MOUTH TWICE DAILY (MAX  DAILY  AMOUNT  600MG)

## 2025-02-03 NOTE — PROGRESS NOTES
Chief Complaint   Patient presents with    Follow-up Chronic Condition        \"Have you been to the ER, urgent care clinic since your last visit?  Hospitalized since your last visit?\"    NO    “Have you seen or consulted any other health care providers outside of  since your last visit?”    [facility]         “Have you had a colorectal cancer screening such as a colonoscopy/FIT/Cologuard?    NO     Date of last Colonoscopy: 12/26/2007  No cologuard on file  Date of last FIT: 7/9/2023   No flexible sigmoidoscopy on file         Click Here for Release of Records Request     Health Maintenance Due   Topic Date Due    Pneumococcal 65+ years Vaccine (1 of 2 - PCV) Never done    Shingles vaccine (2 of 2) 06/23/2024    Colorectal Cancer Screen  07/09/2024    Diabetic retinal exam  07/24/2024    COVID-19 Vaccine (3 - 2023-24 season) 09/01/2024    Annual Wellness Visit (Medicare Advantage)  01/01/2025       
  Medication Sig Dispense Refill    allopurinol (ZYLOPRIM) 300 MG tablet Take 1 tablet by mouth daily 90 tablet 1    glipiZIDE (GLUCOTROL) 5 MG tablet Take 1 tablet by mouth 2 times daily 180 tablet 1    Dulaglutide (TRULICITY) 4.5 MG/0.5ML SOPN INJECT 4.5MG (0.5ML) UNDER THE SKIN ONCE A WEEK (Patient taking differently: Inject 4.5 mg into the skin once a week INJECT 4.5MG (0.5ML) UNDER THE SKIN ONCE A WEEK Sundays) 6 mL 3    metoprolol tartrate (LOPRESSOR) 50 MG tablet Take 1 tablet by mouth 2 times daily 180 tablet 1    acetaminophen (TYLENOL) 500 MG tablet Take 1 tablet by mouth every 6 hours as needed for Pain      aspirin 325 MG tablet Take 1 tablet by mouth daily      ketoconazole (NIZORAL) 2 % cream Apply thin layer topically to affected area twice daily. (Patient not taking: Reported on 2/3/2025) 30 g 1    hydrOXYzine HCl (ATARAX) 25 MG tablet Take 1 tablet by mouth every 8 hours as needed for Itching (Patient not taking: Reported on 2/3/2025) 30 tablet 1    mometasone (ELOCON) 0.1 % ointment Apply topically twice daily to hands x 7 days (Patient not taking: Reported on 2/3/2025) 15 g 3    triamcinolone (KENALOG) 0.1 % cream Apply topically 2 times daily to affected area x 7 days, foot (Patient not taking: Reported on 2/3/2025) 45 g 0    diclofenac sodium (VOLTAREN) 1 % GEL Apply 2 g topically 4 times daily (Patient not taking: Reported on 2/3/2025) 50 g 0     No current facility-administered medications on file prior to visit.       Patient Active Problem List   Diagnosis    Hyperlipidemia    HTN (hypertension)    Lumbar compression fracture (HCC)    Severe obesity (BMI 35.0-39.9) with comorbidity    Type 2 diabetes mellitus with stage 3a chronic kidney disease, without long-term current use of insulin (HCC)    Chronic renal disease, stage III (HCC)    Right hip pain    Radiculopathy, lumbar region    Body mass index [BMI] 37.0-37.9, adult (Z68.37)       Social History     Socioeconomic History    Marital

## 2025-02-13 DIAGNOSIS — E11.22 TYPE 2 DIABETES MELLITUS WITH STAGE 3A CHRONIC KIDNEY DISEASE, WITHOUT LONG-TERM CURRENT USE OF INSULIN (HCC): ICD-10-CM

## 2025-02-13 DIAGNOSIS — Z79.899 LONG TERM USE OF DRUG: ICD-10-CM

## 2025-02-13 DIAGNOSIS — N18.31 TYPE 2 DIABETES MELLITUS WITH STAGE 3A CHRONIC KIDNEY DISEASE, WITHOUT LONG-TERM CURRENT USE OF INSULIN (HCC): ICD-10-CM

## 2025-02-14 LAB
ALBUMIN SERPL-MCNC: 3.6 G/DL (ref 3.5–5)
ALBUMIN/GLOB SERPL: 1.4 (ref 1.1–2.2)
ALP SERPL-CCNC: 64 U/L (ref 45–117)
ALT SERPL-CCNC: 32 U/L (ref 12–78)
ANION GAP SERPL CALC-SCNC: 12 MMOL/L (ref 2–12)
AST SERPL-CCNC: 26 U/L (ref 15–37)
BASOPHILS # BLD: 0.13 K/UL (ref 0–0.1)
BASOPHILS NFR BLD: 1.3 % (ref 0–1)
BILIRUB SERPL-MCNC: 0.4 MG/DL (ref 0.2–1)
BUN SERPL-MCNC: 22 MG/DL (ref 6–20)
BUN/CREAT SERPL: 15 (ref 12–20)
CALCIUM SERPL-MCNC: 9.3 MG/DL (ref 8.5–10.1)
CHLORIDE SERPL-SCNC: 105 MMOL/L (ref 97–108)
CO2 SERPL-SCNC: 22 MMOL/L (ref 21–32)
CREAT SERPL-MCNC: 1.48 MG/DL (ref 0.7–1.3)
DIFFERENTIAL METHOD BLD: ABNORMAL
EOSINOPHIL # BLD: 1 K/UL (ref 0–0.4)
EOSINOPHIL NFR BLD: 10.1 % (ref 0–7)
ERYTHROCYTE [DISTWIDTH] IN BLOOD BY AUTOMATED COUNT: 15.8 % (ref 11.5–14.5)
EST. AVERAGE GLUCOSE BLD GHB EST-MCNC: 177 MG/DL
GLOBULIN SER CALC-MCNC: 2.5 G/DL (ref 2–4)
GLUCOSE SERPL-MCNC: 254 MG/DL (ref 65–100)
HBA1C MFR BLD: 7.8 % (ref 4–5.6)
HCT VFR BLD AUTO: 48.5 % (ref 36.6–50.3)
HGB BLD-MCNC: 15 G/DL (ref 12.1–17)
IMM GRANULOCYTES # BLD AUTO: 0.02 K/UL (ref 0–0.04)
IMM GRANULOCYTES NFR BLD AUTO: 0.2 % (ref 0–0.5)
LYMPHOCYTES # BLD: 2.04 K/UL (ref 0.8–3.5)
LYMPHOCYTES NFR BLD: 20.6 % (ref 12–49)
MCH RBC QN AUTO: 29.5 PG (ref 26–34)
MCHC RBC AUTO-ENTMCNC: 30.9 G/DL (ref 30–36.5)
MCV RBC AUTO: 95.5 FL (ref 80–99)
MONOCYTES # BLD: 0.57 K/UL (ref 0–1)
MONOCYTES NFR BLD: 5.8 % (ref 5–13)
NEUTS SEG # BLD: 6.14 K/UL (ref 1.8–8)
NEUTS SEG NFR BLD: 62 % (ref 32–75)
NRBC # BLD: 0 K/UL (ref 0–0.01)
NRBC BLD-RTO: 0 PER 100 WBC
PLATELET # BLD AUTO: 289 K/UL (ref 150–400)
PMV BLD AUTO: 10.2 FL (ref 8.9–12.9)
POTASSIUM SERPL-SCNC: 4.2 MMOL/L (ref 3.5–5.1)
PROT SERPL-MCNC: 6.1 G/DL (ref 6.4–8.2)
RBC # BLD AUTO: 5.08 M/UL (ref 4.1–5.7)
SODIUM SERPL-SCNC: 139 MMOL/L (ref 136–145)
WBC # BLD AUTO: 9.9 K/UL (ref 4.1–11.1)

## 2025-02-22 DIAGNOSIS — I10 PRIMARY HYPERTENSION: ICD-10-CM

## 2025-02-24 RX ORDER — METOPROLOL TARTRATE 50 MG
50 TABLET ORAL 2 TIMES DAILY
Qty: 180 TABLET | Refills: 0 | Status: SHIPPED | OUTPATIENT
Start: 2025-02-24

## 2025-03-03 ENCOUNTER — TELEPHONE (OUTPATIENT)
Facility: CLINIC | Age: 70
End: 2025-03-03

## 2025-03-03 NOTE — TELEPHONE ENCOUNTER
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 Pt states pcp was working on this last week. Please advise.

## 2025-03-05 NOTE — TELEPHONE ENCOUNTER
Dr. Renetta Rios will like to speak with pcp or pcp nurse in regards of pt paperwork for his DOT physical. Dr. Jackson states she will be with pt majority of the day but can leave a message with . Please advise.

## 2025-03-07 NOTE — TELEPHONE ENCOUNTER
Spoke with Chiropractor Dr. Renetta Jain re DOT physical.     Requesting letter informing patient is stable on gabapentin and is taking as prescribed and aware of side effects.    Notified that Dr. Dhaliwal nor any provider perform DOT physicals and we will contact her if Dr. Dhaliwal is willing to do this.

## 2025-03-07 NOTE — TELEPHONE ENCOUNTER
Dr. Jackson returned call and will like a call back from either Anjelica or Karen. States she has a question about the pt. Please advise.

## 2025-05-20 ENCOUNTER — OFFICE VISIT (OUTPATIENT)
Facility: CLINIC | Age: 70
End: 2025-05-20
Payer: MEDICARE

## 2025-05-20 VITALS
TEMPERATURE: 97.5 F | RESPIRATION RATE: 18 BRPM | HEART RATE: 82 BPM | SYSTOLIC BLOOD PRESSURE: 126 MMHG | WEIGHT: 278 LBS | HEIGHT: 74 IN | BODY MASS INDEX: 35.68 KG/M2 | OXYGEN SATURATION: 94 % | DIASTOLIC BLOOD PRESSURE: 74 MMHG

## 2025-05-20 DIAGNOSIS — Z00.00 MEDICARE ANNUAL WELLNESS VISIT, SUBSEQUENT: ICD-10-CM

## 2025-05-20 DIAGNOSIS — R20.2 PARESTHESIA: ICD-10-CM

## 2025-05-20 DIAGNOSIS — M54.16 RADICULOPATHY, LUMBAR REGION: Primary | ICD-10-CM

## 2025-05-20 DIAGNOSIS — M79.642 PAIN IN BOTH HANDS: ICD-10-CM

## 2025-05-20 DIAGNOSIS — I10 PRIMARY HYPERTENSION: ICD-10-CM

## 2025-05-20 DIAGNOSIS — N18.31 TYPE 2 DIABETES MELLITUS WITH STAGE 3A CHRONIC KIDNEY DISEASE, WITHOUT LONG-TERM CURRENT USE OF INSULIN (HCC): ICD-10-CM

## 2025-05-20 DIAGNOSIS — E66.01 MORBID (SEVERE) OBESITY DUE TO EXCESS CALORIES (HCC): ICD-10-CM

## 2025-05-20 DIAGNOSIS — M25.551 RIGHT HIP PAIN: ICD-10-CM

## 2025-05-20 DIAGNOSIS — M79.641 PAIN IN BOTH HANDS: ICD-10-CM

## 2025-05-20 DIAGNOSIS — E11.22 TYPE 2 DIABETES MELLITUS WITH STAGE 3A CHRONIC KIDNEY DISEASE, WITHOUT LONG-TERM CURRENT USE OF INSULIN (HCC): ICD-10-CM

## 2025-05-20 PROCEDURE — G0439 PPPS, SUBSEQ VISIT: HCPCS | Performed by: FAMILY MEDICINE

## 2025-05-20 PROCEDURE — 99214 OFFICE O/P EST MOD 30 MIN: CPT | Performed by: FAMILY MEDICINE

## 2025-05-20 PROCEDURE — G2211 COMPLEX E/M VISIT ADD ON: HCPCS | Performed by: FAMILY MEDICINE

## 2025-05-20 PROCEDURE — 1159F MED LIST DOCD IN RCRD: CPT | Performed by: FAMILY MEDICINE

## 2025-05-20 PROCEDURE — 3051F HG A1C>EQUAL 7.0%<8.0%: CPT | Performed by: FAMILY MEDICINE

## 2025-05-20 PROCEDURE — 1123F ACP DISCUSS/DSCN MKR DOCD: CPT | Performed by: FAMILY MEDICINE

## 2025-05-20 PROCEDURE — 3078F DIAST BP <80 MM HG: CPT | Performed by: FAMILY MEDICINE

## 2025-05-20 PROCEDURE — 1160F RVW MEDS BY RX/DR IN RCRD: CPT | Performed by: FAMILY MEDICINE

## 2025-05-20 PROCEDURE — 3074F SYST BP LT 130 MM HG: CPT | Performed by: FAMILY MEDICINE

## 2025-05-20 RX ORDER — VERAPAMIL HYDROCHLORIDE 180 MG/1
180 TABLET, FILM COATED, EXTENDED RELEASE ORAL NIGHTLY
Qty: 90 TABLET | Refills: 1 | Status: SHIPPED | OUTPATIENT
Start: 2025-05-20

## 2025-05-20 RX ORDER — GABAPENTIN 300 MG/1
CAPSULE ORAL
Qty: 60 CAPSULE | Refills: 2 | Status: SHIPPED | OUTPATIENT
Start: 2025-05-20 | End: 2025-08-20

## 2025-05-20 RX ORDER — GLIPIZIDE 5 MG/1
5 TABLET ORAL 2 TIMES DAILY
Qty: 180 TABLET | Refills: 1 | Status: SHIPPED | OUTPATIENT
Start: 2025-05-20

## 2025-05-20 RX ORDER — METOPROLOL TARTRATE 50 MG
50 TABLET ORAL 2 TIMES DAILY
Qty: 180 TABLET | Refills: 1 | Status: SHIPPED | OUTPATIENT
Start: 2025-05-20

## 2025-05-20 RX ORDER — LISINOPRIL 20 MG/1
20 TABLET ORAL DAILY
Qty: 90 TABLET | Refills: 1 | Status: SHIPPED | OUTPATIENT
Start: 2025-05-20

## 2025-05-20 ASSESSMENT — PATIENT HEALTH QUESTIONNAIRE - PHQ9
SUM OF ALL RESPONSES TO PHQ QUESTIONS 1-9: 0
1. LITTLE INTEREST OR PLEASURE IN DOING THINGS: NOT AT ALL
SUM OF ALL RESPONSES TO PHQ QUESTIONS 1-9: 0
2. FEELING DOWN, DEPRESSED OR HOPELESS: NOT AT ALL

## 2025-05-20 ASSESSMENT — ENCOUNTER SYMPTOMS
COUGH: 0
SHORTNESS OF BREATH: 0
BACK PAIN: 1
WHEEZING: 0

## 2025-05-20 ASSESSMENT — LIFESTYLE VARIABLES
HOW MANY STANDARD DRINKS CONTAINING ALCOHOL DO YOU HAVE ON A TYPICAL DAY: PATIENT DOES NOT DRINK
HOW OFTEN DO YOU HAVE A DRINK CONTAINING ALCOHOL: NEVER

## 2025-05-20 NOTE — PROGRESS NOTES
\"Have you been to the ER, urgent care clinic since your last visit?  Hospitalized since your last visit?\"    NO    “Have you seen or consulted any other health care providers outside of [facility] since your last visit?”    NO        “Have you had a colorectal cancer screening such as a colonoscopy/FIT/Cologuard?    NO    Date of last Colonoscopy: 12/26/2007  No cologuard on file  Date of last FIT: 7/9/2023   No flexible sigmoidoscopy on file         Click Here for Release of Records Request     Health Maintenance Due   Topic Date Due    Pneumococcal 50+ years Vaccine (1 of 2 - PCV) Never done    Respiratory Syncytial Virus (RSV) Pregnant or age 60 yrs+ (1 - Risk 60-74 years 1-dose series) Never done    Shingles vaccine (2 of 2) 06/23/2024    Colorectal Cancer Screen  07/09/2024    Diabetic retinal exam  07/24/2024    COVID-19 Vaccine (3 - 2024-25 season) 09/01/2024    Annual Wellness Visit (Medicare Advantage)  01/01/2025

## 2025-05-20 NOTE — ASSESSMENT & PLAN NOTE
Chronic, at goal (stable), continue current treatment plan    Orders:    glipiZIDE (GLUCOTROL) 5 MG tablet; Take 1 tablet by mouth 2 times daily    metFORMIN (GLUCOPHAGE) 1000 MG tablet; Take 1 tablet by mouth 2 times daily (with meals)

## 2025-05-20 NOTE — ASSESSMENT & PLAN NOTE
Chronic, at goal (stable), continue current treatment plan    Orders:    metoprolol tartrate (LOPRESSOR) 50 MG tablet; Take 1 tablet by mouth 2 times daily    lisinopril (PRINIVIL;ZESTRIL) 20 MG tablet; Take 1 tablet by mouth daily    verapamil (CALAN SR) 180 MG extended release tablet; Take 1 tablet by mouth nightly

## 2025-05-20 NOTE — PROGRESS NOTES
The following Annual Medicare Wellness Exam is distinct and separate from the medical evaluation and decision making.  Medicare Annual Wellness Visit    Cam Levine is here for 3 Month Follow-Up and Medicare AWV    Assessment & Plan   Radiculopathy, lumbar region  Assessment & Plan:   Chronic, not at goal (unstable), continue current treatment plan    Orders:    gabapentin (NEURONTIN) 300 MG capsule; TAKE 1 CAPSULE BY MOUTH TWICE DAILY (MAX  DAILY  AMOUNT  600MG)    Orders:  -     gabapentin (NEURONTIN) 300 MG capsule; TAKE 1 CAPSULE BY MOUTH TWICE DAILY (MAX  DAILY  AMOUNT  600MG), Disp-60 capsule, R-2Normal  Primary hypertension  Assessment & Plan:   Chronic, at goal (stable), continue current treatment plan    Orders:    metoprolol tartrate (LOPRESSOR) 50 MG tablet; Take 1 tablet by mouth 2 times daily    lisinopril (PRINIVIL;ZESTRIL) 20 MG tablet; Take 1 tablet by mouth daily    verapamil (CALAN SR) 180 MG extended release tablet; Take 1 tablet by mouth nightly    Orders:  -     metoprolol tartrate (LOPRESSOR) 50 MG tablet; Take 1 tablet by mouth 2 times daily, Disp-180 tablet, R-1Normal  -     lisinopril (PRINIVIL;ZESTRIL) 20 MG tablet; Take 1 tablet by mouth daily, Disp-90 tablet, R-1Normal  -     verapamil (CALAN SR) 180 MG extended release tablet; Take 1 tablet by mouth nightly, Disp-90 tablet, R-1Normal  Type 2 diabetes mellitus with stage 3a chronic kidney disease, without long-term current use of insulin ([facility])  Assessment & Plan:   Chronic, at goal (stable), continue current treatment plan    Orders:    glipiZIDE (GLUCOTROL) 5 MG tablet; Take 1 tablet by mouth 2 times daily    metFORMIN (GLUCOPHAGE) 1000 MG tablet; Take 1 tablet by mouth 2 times daily (with meals)    Orders:  -     glipiZIDE (GLUCOTROL) 5 MG tablet; Take 1 tablet by mouth 2 times daily, Disp-180 tablet, R-1Normal  -     metFORMIN (GLUCOPHAGE) 1000 MG tablet; Take 1 tablet by mouth 2 times daily (with meals), Disp-180 tablet,

## 2025-05-20 NOTE — ASSESSMENT & PLAN NOTE
Chronic, not at goal (unstable), continue current treatment plan    Orders:    gabapentin (NEURONTIN) 300 MG capsule; TAKE 1 CAPSULE BY MOUTH TWICE DAILY (MAX  DAILY  AMOUNT  600MG)

## 2025-05-20 NOTE — PROGRESS NOTES
[facility]  Chief Complaint   Patient presents with    3 Month Follow-Up    Medicare AWV       History of Present Illness:   Cam Levine is a 69 y.o. male       HPI:  Here for f/u radiculopathy, chronic pain. Taking meds as directed.  Says creams did not help much with finger so just using blistex. Dry scaly areas, no silvery plaques.    Pain with hands, knees, shoulders, arm. No numbness or weakness in legs. No foot drop.  Pain in hands getting worse, R>L and he is R handed.  Failed injections in back.   Estimated Creatinine Clearance: 68 mL/min (A) (based on SCr of 1.48 mg/dL (H)).  Gabapentin--makes pain tolerable.       Hemoglobin A1C   Date Value Ref Range Status   02/13/2025 7.8 (H) 4.0 - 5.6 % Final     Comment:     (NOTE)  HbA1C Interpretive Ranges  <5.7              Normal  5.7 - 6.4         Consider Prediabetes  >6.5              Consider Diabetes         Reduced glipizide in 11/24, no lows or further dizziness. FSBS 100.   Getting farixga from assistance, needs help with trulicity as well. Some weight loss noted.   Eye doc - pimentel in [place]    Unclear why on asa.     Health Maintenance  Health Maintenance Due   Topic Date Due    Pneumococcal 50+ years Vaccine (1 of 2 - PCV) Never done    Respiratory Syncytial Virus (RSV) Pregnant or age 60 yrs+ (1 - Risk 60-74 years 1-dose series) Never done    Shingles vaccine (2 of 2) 06/23/2024    Colorectal Cancer Screen  07/09/2024    Diabetic retinal exam  07/24/2024    COVID-19 Vaccine (3 - 2024-25 season) 09/01/2024    Annual Wellness Visit (Medicare Advantage)  01/01/2025       Past Medical, Family, and Social History:     Past Medical History:   Diagnosis Date    Diabetes (HCC)     Hypercholesterolemia     Hypertension     Phimosis 09/10/2024    He is s/p circumcision on 10/31/24.        Past Surgical History:   Procedure Laterality Date    CIRCUMCISION N/A 10/31/2024    CIRCUMCISION performed by Jonah Astorga MD at [facility] OR

## 2025-05-20 NOTE — PATIENT INSTRUCTIONS
cholesterol. If you think you may have a problem with alcohol or drug use, talk to your doctor.   Medicines    Take your medicines exactly as prescribed. Call your doctor if you think you are having a problem with your medicine.     If your doctor recommends aspirin, take the amount directed each day. Make sure you take aspirin and not another kind of pain reliever, such as acetaminophen (Tylenol).   When should you call for help?   Call 911 if you have symptoms of a heart attack. These may include:    Chest pain or pressure, or a strange feeling in the chest.     Sweating.     Shortness of breath.     Pain, pressure, or a strange feeling in the back, neck, jaw, or upper belly or in one or both shoulders or arms.     Lightheadedness or sudden weakness.     A fast or irregular heartbeat.   After you call 911, the  may tell you to chew 1 adult-strength or 2 to 4 low-dose aspirin. Wait for an ambulance. Do not try to drive yourself.  Watch closely for changes in your health, and be sure to contact your doctor if you have any problems.  Where can you learn more?  Go to https://www.[employer].net/patientEd and enter F075 to learn more about \"A Healthy Heart: Care Instructions.\"  Current as of: July 31, 2024  Content Version: 14.4  © 8090-7766 [employer]   Care instructions adapted under license by [employer]. If you have questions about a medical condition or this instruction, always ask your healthcare professional. [employer], disclaims any warranty or liability for your use of this information.    Personalized Preventive Plan for Cam Levine - 5/20/2025  Medicare offers a range of preventive health benefits. Some of the tests and screenings are paid in full while other may be subject to a deductible, co-insurance, and/or copay.  Some of these benefits include a comprehensive review of your medical history including lifestyle, illnesses that may run in your family, and various

## 2025-05-21 LAB
ERYTHROCYTE [SEDIMENTATION RATE] IN BLOOD: 1 MM/HR (ref 0–20)
TSH SERPL DL<=0.05 MIU/L-ACNC: 0.54 UIU/ML (ref 0.36–3.74)
VIT B12 SERPL-MCNC: 281 PG/ML (ref 193–986)

## 2025-05-22 LAB — ANA SER QL: NEGATIVE

## 2025-05-23 ENCOUNTER — RESULTS FOLLOW-UP (OUTPATIENT)
Facility: CLINIC | Age: 70
End: 2025-05-23

## 2025-05-23 LAB
C-ANCA TITR SER IF: NORMAL TITER
CCP IGA+IGG SERPL IA-ACNC: 8 UNITS (ref 0–19)
MYELOPEROXIDASE AB SER IA-ACNC: <0.2 UNITS (ref 0–0.9)
P-ANCA ATYPICAL TITR SER IF: NORMAL TITER
P-ANCA TITR SER IF: NORMAL TITER
PROTEINASE3 AB SER IA-ACNC: <0.2 UNITS (ref 0–0.9)
RHEUMATOID FACT SERPL-ACNC: <10 IU/ML

## 2025-06-24 ENCOUNTER — HOSPITAL ENCOUNTER (EMERGENCY)
Facility: HOSPITAL | Age: 70
Discharge: HOME | End: 2025-06-24
Attending: STUDENT IN AN ORGANIZED HEALTH CARE EDUCATION/TRAINING PROGRAM
Payer: MEDICARE

## 2025-06-24 ENCOUNTER — APPOINTMENT (OUTPATIENT)
Facility: HOSPITAL | Age: 70
End: 2025-06-24
Payer: MEDICARE

## 2025-06-24 ENCOUNTER — TELEPHONE (OUTPATIENT)
Facility: CLINIC | Age: 70
End: 2025-06-24

## 2025-06-24 VITALS
RESPIRATION RATE: 13 BRPM | DIASTOLIC BLOOD PRESSURE: 57 MMHG | HEART RATE: 75 BPM | OXYGEN SATURATION: 94 % | HEIGHT: 74 IN | WEIGHT: 278 LBS | TEMPERATURE: 97.8 F | BODY MASS INDEX: 35.68 KG/M2 | SYSTOLIC BLOOD PRESSURE: 117 MMHG

## 2025-06-24 DIAGNOSIS — R53.83 OTHER FATIGUE: Primary | ICD-10-CM

## 2025-06-24 LAB
ALBUMIN SERPL-MCNC: 3.4 G/DL (ref 3.5–5)
ALBUMIN/GLOB SERPL: 1.3 (ref 1.1–2.2)
ALP SERPL-CCNC: 68 U/L (ref 45–117)
ALT SERPL-CCNC: 41 U/L (ref 12–78)
ANION GAP SERPL CALC-SCNC: 7 MMOL/L (ref 2–12)
AST SERPL W P-5'-P-CCNC: 23 U/L (ref 15–37)
BASOPHILS # BLD: 0.12 K/UL (ref 0–0.1)
BASOPHILS NFR BLD: 1.1 % (ref 0–1)
BILIRUB SERPL-MCNC: 0.3 MG/DL (ref 0.2–1)
BNP SERPL-MCNC: 59 PG/ML
BUN SERPL-MCNC: 22 MG/DL (ref 6–20)
BUN/CREAT SERPL: 18 (ref 12–20)
CA-I BLD-MCNC: 9.6 MG/DL (ref 8.5–10.1)
CHLORIDE SERPL-SCNC: 110 MMOL/L (ref 97–108)
CO2 SERPL-SCNC: 23 MMOL/L (ref 21–32)
CREAT SERPL-MCNC: 1.2 MG/DL (ref 0.7–1.3)
DIFFERENTIAL METHOD BLD: ABNORMAL
EOSINOPHIL # BLD: 0.96 K/UL (ref 0–0.4)
EOSINOPHIL NFR BLD: 9.1 % (ref 0–7)
ERYTHROCYTE [DISTWIDTH] IN BLOOD BY AUTOMATED COUNT: 14.6 % (ref 11.5–14.5)
GLOBULIN SER CALC-MCNC: 2.7 G/DL (ref 2–4)
GLUCOSE SERPL-MCNC: 98 MG/DL (ref 65–100)
HCT VFR BLD AUTO: 45.2 % (ref 36.6–50.3)
HGB BLD-MCNC: 14.9 G/DL (ref 12.1–17)
IMM GRANULOCYTES # BLD AUTO: 0.05 K/UL (ref 0–0.04)
IMM GRANULOCYTES NFR BLD AUTO: 0.5 % (ref 0–0.5)
LYMPHOCYTES # BLD: 2.45 K/UL (ref 0.8–3.5)
LYMPHOCYTES NFR BLD: 23.1 % (ref 12–49)
MCH RBC QN AUTO: 30.3 PG (ref 26–34)
MCHC RBC AUTO-ENTMCNC: 33 G/DL (ref 30–36.5)
MCV RBC AUTO: 92.1 FL (ref 80–99)
MONOCYTES # BLD: 0.84 K/UL (ref 0–1)
MONOCYTES NFR BLD: 7.9 % (ref 5–13)
NEUTS SEG # BLD: 6.17 K/UL (ref 1.8–8)
NEUTS SEG NFR BLD: 58.3 % (ref 32–75)
NRBC # BLD: 0 K/UL (ref 0–0.01)
NRBC BLD-RTO: 0 PER 100 WBC
PLATELET # BLD AUTO: 271 K/UL (ref 150–400)
PMV BLD AUTO: 9.6 FL (ref 8.9–12.9)
POTASSIUM SERPL-SCNC: 4.1 MMOL/L (ref 3.5–5.1)
PROT SERPL-MCNC: 6.1 G/DL (ref 6.4–8.2)
RBC # BLD AUTO: 4.91 M/UL (ref 4.1–5.7)
SODIUM SERPL-SCNC: 140 MMOL/L (ref 136–145)
TROPONIN I SERPL HS-MCNC: 5 NG/L (ref 0–76)
TROPONIN I SERPL HS-MCNC: 6 NG/L (ref 0–76)
WBC # BLD AUTO: 10.6 K/UL (ref 4.1–11.1)

## 2025-06-24 PROCEDURE — 84484 ASSAY OF TROPONIN QUANT: CPT

## 2025-06-24 PROCEDURE — 71045 X-RAY EXAM CHEST 1 VIEW: CPT

## 2025-06-24 PROCEDURE — 83880 ASSAY OF NATRIURETIC PEPTIDE: CPT

## 2025-06-24 PROCEDURE — 80053 COMPREHEN METABOLIC PANEL: CPT

## 2025-06-24 PROCEDURE — 93005 ELECTROCARDIOGRAM TRACING: CPT | Performed by: STUDENT IN AN ORGANIZED HEALTH CARE EDUCATION/TRAINING PROGRAM

## 2025-06-24 PROCEDURE — 36415 COLL VENOUS BLD VENIPUNCTURE: CPT

## 2025-06-24 PROCEDURE — 85025 COMPLETE CBC W/AUTO DIFF WBC: CPT

## 2025-06-24 PROCEDURE — 99285 EMERGENCY DEPT VISIT HI MDM: CPT

## 2025-06-24 ASSESSMENT — PAIN SCALES - GENERAL: PAINLEVEL_OUTOF10: 7

## 2025-06-24 ASSESSMENT — PAIN DESCRIPTION - LOCATION: LOCATION: BACK

## 2025-06-24 ASSESSMENT — PAIN - FUNCTIONAL ASSESSMENT
PAIN_FUNCTIONAL_ASSESSMENT: 0-10
PAIN_FUNCTIONAL_ASSESSMENT: NONE - DENIES PAIN

## 2025-06-24 NOTE — TELEPHONE ENCOUNTER
Pt's wife states the pt feels like he does not have any energy. She states he gets worn out just walking from the house to the vehicle. Pt has been taking him meds, diet has not changed, She states his BP yesterday was fine as well. Pt got on phone and states he just feels tired all the time now, with no energy, and gets worn out doing the smallest thing. Pt would like to talk to the Dr. Please advise.

## 2025-06-24 NOTE — TELEPHONE ENCOUNTER
Returned call and spoke to patient. He was unable to describe how he has felt. Denies being SOB, dizzy, or CP.  He states \" I'm just fatigued and get out easy.\"  Reports blood sugars in 100's range. Asked him to take BP while on the phone. /75 P 149    Asked him  to feel radial pulse but neither him or wife able to feel pulse correctly.   I recommended patient to go to ER for eval of sx. He agrees.     Attending Psychiatrist without NP/Trainee

## 2025-06-24 NOTE — ED TRIAGE NOTES
Pt arrives with complaints of \"no energy\".     PCP sent him here to be evaluated. States that walking across the parking lot to get inside has taken all of his energy.     States that they checked his pulse at home and it was in the 140s.     States that this has been going on since last week.

## 2025-06-25 LAB
EKG ATRIAL RATE: 77 BPM
EKG DIAGNOSIS: NORMAL
EKG P AXIS: 37 DEGREES
EKG P-R INTERVAL: 186 MS
EKG Q-T INTERVAL: 390 MS
EKG QRS DURATION: 116 MS
EKG QTC CALCULATION (BAZETT): 441 MS
EKG R AXIS: -8 DEGREES
EKG T AXIS: 37 DEGREES
EKG VENTRICULAR RATE: 77 BPM

## 2025-06-26 NOTE — ED PROVIDER NOTES
[facility] EMERGENCY DEPT  EMERGENCY DEPARTMENT HISTORY AND PHYSICAL EXAM      Date of evaluation: 6/24/2025  Patient Name: Cam Levine  Birthdate 1955  MRN: [id number]  ED Provider: Davida Sanders MD   Note Started: 10:18 AM EDT 6/26/25    HISTORY OF PRESENT ILLNESS     Chief Complaint   Patient presents with    Fatigue       History Provided By: Patient, only     HPI: Cam Levine is a 70 y.o. male with PMH of DM, HTN, HLD, and obesity who comes to the ED with chief complaint of fatigue.  Patient reports over the last couple of weeks has been experiencing fatigue and low energy.  He reports when he is exerting himself is feeling very tired.  He however is denying any sensation of chest pain or shortness of breath during that period.  Patient had an episode at home and she checked his pulse and it was in the 140s.  Currently, patient denies any chest pain, shortness of breath, lightheadedness or dizziness.  There is no lower extremity swelling or pain.  Denies any recent illnesses.  No nausea or vomiting.  No known history of cardiac disease.    PAST MEDICAL HISTORY   Past Medical History:  Past Medical History:   Diagnosis Date    Diabetes (HCC)     Hypercholesterolemia     Hypertension     Phimosis 09/10/2024    He is s/p circumcision on 10/31/24.         Past Surgical History:  Past Surgical History:   Procedure Laterality Date    CIRCUMCISION N/A 10/31/2024    CIRCUMCISION performed by Jonah Astorga MD at [facility] MAIN OR    COLONOSCOPY      ORTHOPEDIC SURGERY  2004    rt shoulder surgery    ORTHOPEDIC SURGERY      fx lft arm       Family History:  Family History   Problem Relation Age of Onset    Diabetes Father     Hypertension Father        Social History:  Social History     Tobacco Use    Smoking status: Former     Current packs/day: 0.00     Average packs/day: 2.0 packs/day for 30.0 years (60.0 ttl pk-yrs)     Types: Cigarettes     Start date: 6/1/1962     Quit date: 6/1/1992     Years since quitting:

## 2025-07-08 ENCOUNTER — OFFICE VISIT (OUTPATIENT)
Facility: CLINIC | Age: 70
End: 2025-07-08
Payer: MEDICARE

## 2025-07-08 VITALS
OXYGEN SATURATION: 96 % | HEIGHT: 74 IN | BODY MASS INDEX: 35.81 KG/M2 | SYSTOLIC BLOOD PRESSURE: 106 MMHG | WEIGHT: 279 LBS | TEMPERATURE: 97.6 F | HEART RATE: 67 BPM | RESPIRATION RATE: 20 BRPM | DIASTOLIC BLOOD PRESSURE: 67 MMHG

## 2025-07-08 DIAGNOSIS — N18.31 TYPE 2 DIABETES MELLITUS WITH STAGE 3A CHRONIC KIDNEY DISEASE, WITHOUT LONG-TERM CURRENT USE OF INSULIN (HCC): Primary | ICD-10-CM

## 2025-07-08 DIAGNOSIS — E53.8 B12 DEFICIENCY: ICD-10-CM

## 2025-07-08 DIAGNOSIS — Z09 HOSPITAL DISCHARGE FOLLOW-UP: ICD-10-CM

## 2025-07-08 DIAGNOSIS — I10 PRIMARY HYPERTENSION: ICD-10-CM

## 2025-07-08 DIAGNOSIS — E11.22 TYPE 2 DIABETES MELLITUS WITH STAGE 3A CHRONIC KIDNEY DISEASE, WITHOUT LONG-TERM CURRENT USE OF INSULIN (HCC): Primary | ICD-10-CM

## 2025-07-08 DIAGNOSIS — E78.2 MIXED HYPERLIPIDEMIA: ICD-10-CM

## 2025-07-08 PROCEDURE — 3078F DIAST BP <80 MM HG: CPT

## 2025-07-08 PROCEDURE — 3051F HG A1C>EQUAL 7.0%<8.0%: CPT

## 2025-07-08 PROCEDURE — 2022F DILAT RTA XM EVC RTNOPTHY: CPT

## 2025-07-08 PROCEDURE — 99214 OFFICE O/P EST MOD 30 MIN: CPT

## 2025-07-08 PROCEDURE — G8427 DOCREV CUR MEDS BY ELIG CLIN: HCPCS

## 2025-07-08 PROCEDURE — 3017F COLORECTAL CA SCREEN DOC REV: CPT

## 2025-07-08 PROCEDURE — 1111F DSCHRG MED/CURRENT MED MERGE: CPT

## 2025-07-08 PROCEDURE — 1036F TOBACCO NON-USER: CPT

## 2025-07-08 PROCEDURE — G8417 CALC BMI ABV UP PARAM F/U: HCPCS

## 2025-07-08 PROCEDURE — 1123F ACP DISCUSS/DSCN MKR DOCD: CPT

## 2025-07-08 PROCEDURE — 1125F AMNT PAIN NOTED PAIN PRSNT: CPT

## 2025-07-08 PROCEDURE — 3074F SYST BP LT 130 MM HG: CPT

## 2025-07-08 RX ORDER — MULTIVITAMIN WITH IRON
1000 TABLET ORAL DAILY
Qty: 90 TABLET | Refills: 3 | Status: SHIPPED | OUTPATIENT
Start: 2025-07-08

## 2025-07-08 NOTE — PATIENT INSTRUCTIONS
Take Lopressor 25 mg twice daily (cut your 50 mg tablet in half)      [facility]  Affiliated with [address]  [address] [phone]    Monitor blood pressure outside the office several times weekly at different times during the day and evening.   Blood Pressure Record     Patient Name:  _____Cam Levine_________________ :  ________1955________    Date/Time BP Reading Pulse

## 2025-07-08 NOTE — PROGRESS NOTES
\"Have you been to the ER, urgent care clinic since your last visit?  Hospitalized since your last visit?\"    Yes     “Have you seen or consulted any other health care providers outside our system since your last visit?”    no      “Have you had a colorectal cancer screening such as a colonoscopy/FIT/Cologuard?    NO    Date of last Colonoscopy: 12/26/2007  No cologuard on file  Date of last FIT: 7/9/2023   No flexible sigmoidoscopy on file     “Have you had a diabetic eye exam?”    NO     Date of last diabetic eye exam: 7/24/2023             Goals that were addressed and/or need to be completed during or after this appointment include   Health Maintenance Due   Topic Date Due    Pneumococcal 50+ years Vaccine (1 of 2 - PCV) Never done    Respiratory Syncytial Virus (RSV) Pregnant or age 60 yrs+ (1 - Risk 60-74 years 1-dose series) Never done    Shingles vaccine (2 of 2) 06/23/2024    Colorectal Cancer Screen  07/09/2024    Diabetic retinal exam  07/24/2024    COVID-19 Vaccine (3 - 2024-25 season) 09/01/2024

## 2025-07-08 NOTE — PROGRESS NOTES
Post-Discharge Transitional Care  Follow Up      Cam Levine   YOB: 1955    Date of Office Visit:  7/8/2025  Date of Hospital Admission: 6/24/25  Date of Hospital Discharge: 6/24/25  Risk of hospital readmission (high >=14%. Medium >=10%) :No data recorded    Care management risk score Rising risk (score 2-5) and Complex Care (Scores >=6): No Risk Score On File     Non face to face  following discharge, date last encounter closed (first attempt may have been earlier): *No documented post hospital discharge outreach found in the last 14 days    Call initiated 2 business days of discharge: *No response recorded in the last 14 days    ASSESSMENT/PLAN:   Type 2 diabetes mellitus with stage 3a chronic kidney disease, without long-term current use of insulin (HCC)  POA, uncontrolled. Labs as below. No med changes done today.   -     Albumin/Creatinine Ratio, Urine; Future  -     Hemoglobin A1C; Future    Mixed hyperlipidemia  POA. Lab as below. Cont Pravachol 40 mg qhs, Zetia 10 mg qd.   -     Lipid Panel; Future    B12 deficiency  Start supp as below.   Lab Results   Component Value Date    [id number] 281 05/20/2025     -     vitamin B-12 (CYANOCOBALAMIN) 500 MCG tablet; Take 2 tablets by mouth daily, Disp-90 tablet, R-3Normal    Primary hypertension  POA /67. Currently on 4 medications for HTN (HCTZ, lisinopril, Lopressor, Calan). Recommend decreasing Lopressor to 25 mg bid as known SE of fatigue. To monitor BP at home and ff up in 1 week for BP check. High potential to d/c eventually if w/ good BP control.   -     Lipid Panel; Future  -     Albumin/Creatinine Ratio, Urine; Future      Medical Decision Making: moderate complexity  Return in about 1 week (around 7/15/2025) for BP check.           Subjective:   HPI:  Follow up of Hospital problems/diagnosis(es):     Inpatient course: Discharge summary reviewed- see chart.    Interval history/Current status:   - ED visit 6/24 for fatigue/low

## 2025-07-09 LAB
CHOLEST SERPL-MCNC: 121 MG/DL
CREAT UR-MCNC: 112 MG/DL
EST. AVERAGE GLUCOSE BLD GHB EST-MCNC: 160 MG/DL
HBA1C MFR BLD: 7.2 % (ref 4–5.6)
HDLC SERPL-MCNC: 33 MG/DL
HDLC SERPL: 3.7 (ref 0–5)
LDLC SERPL CALC-MCNC: 29 MG/DL (ref 0–100)
MICROALBUMIN UR-MCNC: 0.79 MG/DL
MICROALBUMIN/CREAT UR-RTO: 7 MG/G (ref 0–30)
TRIGL SERPL-MCNC: 295 MG/DL
VLDLC SERPL CALC-MCNC: 59 MG/DL

## 2025-07-10 ENCOUNTER — RESULTS FOLLOW-UP (OUTPATIENT)
Facility: CLINIC | Age: 70
End: 2025-07-10

## 2025-07-15 ENCOUNTER — OFFICE VISIT (OUTPATIENT)
Facility: CLINIC | Age: 70
End: 2025-07-15
Payer: MEDICARE

## 2025-07-15 VITALS
OXYGEN SATURATION: 94 % | RESPIRATION RATE: 16 BRPM | HEIGHT: 74 IN | DIASTOLIC BLOOD PRESSURE: 75 MMHG | WEIGHT: 271 LBS | SYSTOLIC BLOOD PRESSURE: 125 MMHG | HEART RATE: 76 BPM | BODY MASS INDEX: 34.78 KG/M2 | TEMPERATURE: 96.9 F

## 2025-07-15 DIAGNOSIS — R19.7 DIARRHEA, UNSPECIFIED TYPE: Primary | ICD-10-CM

## 2025-07-15 DIAGNOSIS — N18.31 TYPE 2 DIABETES MELLITUS WITH STAGE 3A CHRONIC KIDNEY DISEASE, WITHOUT LONG-TERM CURRENT USE OF INSULIN (HCC): ICD-10-CM

## 2025-07-15 DIAGNOSIS — E11.22 TYPE 2 DIABETES MELLITUS WITH STAGE 3A CHRONIC KIDNEY DISEASE, WITHOUT LONG-TERM CURRENT USE OF INSULIN (HCC): ICD-10-CM

## 2025-07-15 DIAGNOSIS — R19.7 DIARRHEA, UNSPECIFIED TYPE: ICD-10-CM

## 2025-07-15 DIAGNOSIS — R63.4 WEIGHT LOSS: ICD-10-CM

## 2025-07-15 PROCEDURE — 3074F SYST BP LT 130 MM HG: CPT | Performed by: FAMILY MEDICINE

## 2025-07-15 PROCEDURE — 1159F MED LIST DOCD IN RCRD: CPT | Performed by: FAMILY MEDICINE

## 2025-07-15 PROCEDURE — 2022F DILAT RTA XM EVC RTNOPTHY: CPT | Performed by: FAMILY MEDICINE

## 2025-07-15 PROCEDURE — G2211 COMPLEX E/M VISIT ADD ON: HCPCS | Performed by: FAMILY MEDICINE

## 2025-07-15 PROCEDURE — G8417 CALC BMI ABV UP PARAM F/U: HCPCS | Performed by: FAMILY MEDICINE

## 2025-07-15 PROCEDURE — 3017F COLORECTAL CA SCREEN DOC REV: CPT | Performed by: FAMILY MEDICINE

## 2025-07-15 PROCEDURE — 3051F HG A1C>EQUAL 7.0%<8.0%: CPT | Performed by: FAMILY MEDICINE

## 2025-07-15 PROCEDURE — G8427 DOCREV CUR MEDS BY ELIG CLIN: HCPCS | Performed by: FAMILY MEDICINE

## 2025-07-15 PROCEDURE — 3078F DIAST BP <80 MM HG: CPT | Performed by: FAMILY MEDICINE

## 2025-07-15 PROCEDURE — 1123F ACP DISCUSS/DSCN MKR DOCD: CPT | Performed by: FAMILY MEDICINE

## 2025-07-15 PROCEDURE — 1160F RVW MEDS BY RX/DR IN RCRD: CPT | Performed by: FAMILY MEDICINE

## 2025-07-15 PROCEDURE — 1036F TOBACCO NON-USER: CPT | Performed by: FAMILY MEDICINE

## 2025-07-15 PROCEDURE — 99214 OFFICE O/P EST MOD 30 MIN: CPT | Performed by: FAMILY MEDICINE

## 2025-07-15 RX ORDER — CIPROFLOXACIN 500 MG/1
500 TABLET, FILM COATED ORAL 2 TIMES DAILY
Qty: 14 TABLET | Refills: 0 | Status: SHIPPED | OUTPATIENT
Start: 2025-07-15 | End: 2025-07-22

## 2025-07-15 ASSESSMENT — ENCOUNTER SYMPTOMS
BLOOD IN STOOL: 0
ABDOMINAL PAIN: 1
DIARRHEA: 1
NAUSEA: 0
CONSTIPATION: 0
VOMITING: 0

## 2025-07-15 NOTE — ASSESSMENT & PLAN NOTE
Chronic, at goal (stable), hold metformin       Justification for level of billing, time spent: 32 min with patient, reviewing chart and face to face exam, clinical documentation. Time prior to the visit was spent reviewing external notes results and imaging reports.  As well during the visit, time included evaluating the patient, discussing results and plans with the patient, and coordinating care.  As well, after the visit additional time spent documenting clinical care, interpreting results, and coordinating care.  This time was all spent during the date of service.

## 2025-07-15 NOTE — PROGRESS NOTES
Chief Complaint   Patient presents with    Follow-up     1 week follow up for BP. Low appetite, diarrhea, heavy sweating/fatigue x1-2 months         \"Have you been to the ER, urgent care clinic since your last visit?  Hospitalized since your last visit?\"    NO    “Have you seen or consulted any other health care providers outside of [facility] since your last visit?”    NO        “Have you had a colorectal cancer screening such as a colonoscopy/FIT/Cologuard?    NO    Date of last Colonoscopy: 12/26/2007  No cologuard on file  Date of last FIT: 7/9/2023   No flexible sigmoidoscopy on file         Click Here for Release of Records Request     Health Maintenance Due   Topic Date Due    Pneumococcal 50+ years Vaccine (1 of 2 - PCV) Never done    Respiratory Syncytial Virus (RSV) Pregnant or age 60 yrs+ (1 - Risk 60-74 years 1-dose series) Never done    Shingles vaccine (2 of 2) 06/23/2024    Colorectal Cancer Screen  07/09/2024    Diabetic retinal exam  07/24/2024    COVID-19 Vaccine (3 - 2024-25 season) 09/01/2024    Diabetic foot exam  08/12/2025       
Vital Sign     Unable to Pay for Housing in the Last Year: No     Number of Times Moved in the Last Year: 0     Homeless in the Last Year: No        Review of Systems   Review of Systems   Constitutional:  Positive for fatigue. Negative for fever.   Gastrointestinal:  Positive for abdominal pain and diarrhea. Negative for blood in stool, constipation, nausea and vomiting.   Neurological:  Positive for weakness.         Objective:   /75   Pulse 76   Temp 96.9 °F (36.1 °C) (Infrared)   Resp 16   Ht 1.88 m (6' 2\")   Wt 122.9 kg (271 lb)   SpO2 94%   BMI 34.79 kg/m²    Physical Exam   PHYSICAL EXAM:  Gen: Pt sitting in chair, in NAD  Head: Normocephalic, atraumatic  Eyes: Sclera anicteric, EOM grossly intact  CVS: Normal S1, S2, no m/r/g  Resp: CTAB, no wheezes or rales or rhonchi  Abd: Soft, non-tender, non-distended, +BS  Neuro: Alert, oriented, appropriate      Assessment and orders:       ICD-10-CM    1. Diarrhea, unspecified type  R19.7 Clostridium Difficile Toxin/Antigen     Ova+Parasite + Giardia     Culture, Stool     Allergen, Food, Alpha-Gal Panel, IgE     AFL - RigobertoTarun king MD, Gastroenterology, [place]     ciprofloxacin (CIPRO) 500 MG tablet     Allergen, Food, Alpha-Gal Panel, IgE      2. Weight loss  R63.4         Assessment & Plan  Diarrhea, unspecified type   Hold metformin, check for c diff, stool culture, o&P, treat empirically with cipro, refer to GI    Orders:    Clostridium Difficile Toxin/Antigen; Future    Ova+Parasite + Giardia; Future    Culture, Stool; Future    Allergen, Food, Alpha-Gal Panel, IgE; Future    JAILENE - RigobertoTarun king MD, Gastroenterology, [place]    ciprofloxacin (CIPRO) 500 MG tablet; Take 1 tablet by mouth 2 times daily for 7 days    Weight loss   Concerning due to recent diarrhea, normal tsh, referred to GI           Type 2 diabetes mellitus with stage 3a chronic kidney disease, without long-term current use of insulin (HCC)   Chronic, at goal (stable), hold

## 2025-07-16 LAB
C DIFF GDH STL QL: NEGATIVE
C DIFF TOX A+B STL QL IA: NEGATIVE
C DIFF TOXIN INTERPRETATION: NORMAL

## 2025-07-18 ENCOUNTER — RESULTS FOLLOW-UP (OUTPATIENT)
Age: 70
End: 2025-07-18

## 2025-07-18 LAB
ALLERGEN LAMB/MUTTON, IGE, AGAL3: 0.4 KU/L
ALPHA-GAL IGE QN: 3.12 KU/L
BEEF IGE QN: 1.15 KU/L
G LAMBLIA AG STL QL IA: NEGATIVE
IGE SERPL-ACNC: 93 IU/ML (ref 6–495)
Lab: ABNORMAL
O+P STL CONC: NORMAL
PORK IGE QN: 0.23 KU/L
SPECIMEN SOURCE: NORMAL

## 2025-07-20 LAB
BACTERIA SPEC CULT: NORMAL
CAMPYLOBACTER STL CULT: NORMAL
E COLI SXT STL QL IA: NEGATIVE
SALMONELLA/SHIGELLA SCREEN: NORMAL
SPECIMEN SOURCE: NORMAL

## 2025-07-21 ENCOUNTER — TELEPHONE (OUTPATIENT)
Facility: CLINIC | Age: 70
End: 2025-07-21

## 2025-07-21 DIAGNOSIS — R19.7 DIARRHEA, UNSPECIFIED TYPE: ICD-10-CM

## 2025-07-21 RX ORDER — CIPROFLOXACIN 500 MG/1
500 TABLET, FILM COATED ORAL 2 TIMES DAILY
Qty: 14 TABLET | Refills: 0 | Status: SHIPPED | OUTPATIENT
Start: 2025-07-21 | End: 2025-07-28

## 2025-07-21 NOTE — TELEPHONE ENCOUNTER
Pt would like for the pcp to contact him. He would like to discuss taking dorycycline for his tick disease. Pt states he would like to try this to see if it would help. Please advise.

## 2025-07-21 NOTE — TELEPHONE ENCOUNTER
Patient notes that he is having persistent issues with diarrhea, low energy, and not feeling well still.  Discussed about Alpha gal, and explained that Doxycycline would not help to reduce Alpha gal.  Of note has been avoiding Beef for about 4 days now.  Still possible gelatin in the diet.    Will continue Cipro for 1 week in case there is tickborn illness component, and will try to remove as much mammal protein from diet over the next week.  Follow up with Dr. Dhaliwal in about a week.    nAant Jorgensen MD  [facility]  07/21/25

## 2025-07-23 DIAGNOSIS — N18.31 TYPE 2 DIABETES MELLITUS WITH STAGE 3A CHRONIC KIDNEY DISEASE, WITHOUT LONG-TERM CURRENT USE OF INSULIN (HCC): ICD-10-CM

## 2025-07-23 DIAGNOSIS — E11.22 TYPE 2 DIABETES MELLITUS WITH STAGE 3A CHRONIC KIDNEY DISEASE, WITHOUT LONG-TERM CURRENT USE OF INSULIN (HCC): ICD-10-CM

## 2025-07-23 RX ORDER — DAPAGLIFLOZIN 10 MG/1
10 TABLET, FILM COATED ORAL EVERY MORNING
Qty: 90 TABLET | Refills: 3 | Status: SHIPPED | OUTPATIENT
Start: 2025-07-23

## 2025-07-23 NOTE — TELEPHONE ENCOUNTER
Received fax from [employer] patient assistance that patient needs to Rx for farxiga on file. Medication pended for their [facility].

## 2025-07-24 ENCOUNTER — TELEPHONE (OUTPATIENT)
Facility: CLINIC | Age: 70
End: 2025-07-24

## 2025-07-24 NOTE — TELEPHONE ENCOUNTER
Pt's wife states that the Cipro is making the pt very dizzy and light headed. Please advise if pt should continue medication.

## 2025-07-30 ENCOUNTER — OFFICE VISIT (OUTPATIENT)
Facility: CLINIC | Age: 70
End: 2025-07-30
Payer: MEDICARE

## 2025-07-30 VITALS
OXYGEN SATURATION: 96 % | DIASTOLIC BLOOD PRESSURE: 67 MMHG | HEART RATE: 78 BPM | SYSTOLIC BLOOD PRESSURE: 106 MMHG | RESPIRATION RATE: 16 BRPM | HEIGHT: 74 IN | WEIGHT: 270 LBS | TEMPERATURE: 97.5 F | BODY MASS INDEX: 34.65 KG/M2

## 2025-07-30 DIAGNOSIS — N18.31 TYPE 2 DIABETES MELLITUS WITH STAGE 3A CHRONIC KIDNEY DISEASE, WITHOUT LONG-TERM CURRENT USE OF INSULIN (HCC): ICD-10-CM

## 2025-07-30 DIAGNOSIS — R10.84 GENERALIZED ABDOMINAL PAIN: Primary | ICD-10-CM

## 2025-07-30 DIAGNOSIS — E11.22 TYPE 2 DIABETES MELLITUS WITH STAGE 3A CHRONIC KIDNEY DISEASE, WITHOUT LONG-TERM CURRENT USE OF INSULIN (HCC): ICD-10-CM

## 2025-07-30 LAB — GLUCOSE, POC: 162 MG/DL

## 2025-07-30 PROCEDURE — 3074F SYST BP LT 130 MM HG: CPT | Performed by: FAMILY MEDICINE

## 2025-07-30 PROCEDURE — 82962 GLUCOSE BLOOD TEST: CPT | Performed by: FAMILY MEDICINE

## 2025-07-30 PROCEDURE — 1123F ACP DISCUSS/DSCN MKR DOCD: CPT | Performed by: FAMILY MEDICINE

## 2025-07-30 PROCEDURE — G8417 CALC BMI ABV UP PARAM F/U: HCPCS | Performed by: FAMILY MEDICINE

## 2025-07-30 PROCEDURE — 3078F DIAST BP <80 MM HG: CPT | Performed by: FAMILY MEDICINE

## 2025-07-30 PROCEDURE — 2022F DILAT RTA XM EVC RTNOPTHY: CPT | Performed by: FAMILY MEDICINE

## 2025-07-30 PROCEDURE — 99214 OFFICE O/P EST MOD 30 MIN: CPT | Performed by: FAMILY MEDICINE

## 2025-07-30 PROCEDURE — G8427 DOCREV CUR MEDS BY ELIG CLIN: HCPCS | Performed by: FAMILY MEDICINE

## 2025-07-30 PROCEDURE — G2211 COMPLEX E/M VISIT ADD ON: HCPCS | Performed by: FAMILY MEDICINE

## 2025-07-30 PROCEDURE — 3051F HG A1C>EQUAL 7.0%<8.0%: CPT | Performed by: FAMILY MEDICINE

## 2025-07-30 PROCEDURE — 1160F RVW MEDS BY RX/DR IN RCRD: CPT | Performed by: FAMILY MEDICINE

## 2025-07-30 PROCEDURE — 3017F COLORECTAL CA SCREEN DOC REV: CPT | Performed by: FAMILY MEDICINE

## 2025-07-30 PROCEDURE — 1036F TOBACCO NON-USER: CPT | Performed by: FAMILY MEDICINE

## 2025-07-30 PROCEDURE — 1159F MED LIST DOCD IN RCRD: CPT | Performed by: FAMILY MEDICINE

## 2025-07-30 ASSESSMENT — ENCOUNTER SYMPTOMS
ABDOMINAL PAIN: 1
DIARRHEA: 1
BLOOD IN STOOL: 0

## 2025-07-30 NOTE — ASSESSMENT & PLAN NOTE
Chronic, at goal (stable), continue current treatment plan    Orders:    COLLECTION CAPILLARY BLOOD SPECIMEN    AMB POC GLUCOSE BLOOD, BY GLUCOSE MONITORING DEVICE  Justification for level of billing, time spent: 32 min with patient, reviewing chart and face to face exam, clinical documentation. Time prior to the visit was spent reviewing external notes results and imaging reports.  As well during the visit, time included evaluating the patient, discussing results and plans with the patient, and coordinating care.  As well, after the visit additional time spent documenting clinical care, interpreting results, and coordinating care.  This time was all spent during the date of service.

## 2025-07-30 NOTE — PROGRESS NOTES
Chief Complaint   Patient presents with    Follow-up     2 week follow up for alpha gal, had to stop cipro because of lightheadedness/dizziness. Had to stop metformin again from diarrhea. Patient feels worse than he did at last visit. No appetite.         \"Have you been to the ER, urgent care clinic since your last visit?  Hospitalized since your last visit?\"    NO    “Have you seen or consulted any other health care providers outside of [facility] since your last visit?”    NO        “Have you had a colorectal cancer screening such as a colonoscopy/FIT/Cologuard?    NO    Date of last Colonoscopy: 12/26/2007  No cologuard on file  Date of last FIT: 7/9/2023   No flexible sigmoidoscopy on file         Click Here for Release of Records Request     Health Maintenance Due   Topic Date Due    Pneumococcal 50+ years Vaccine (1 of 2 - PCV) Never done    Respiratory Syncytial Virus (RSV) Pregnant or age 60 yrs+ (1 - Risk 60-74 years 1-dose series) Never done    Shingles vaccine (2 of 2) 06/23/2024    Colorectal Cancer Screen  07/09/2024    Diabetic retinal exam  07/24/2024    COVID-19 Vaccine (3 - 2024-25 season) 09/01/2024    Diabetic foot exam  08/12/2025       
CIRCUMCISION performed by Jonah Astorga MD at [facility] MAIN OR    COLONOSCOPY      ORTHOPEDIC SURGERY  2004    rt shoulder surgery    ORTHOPEDIC SURGERY      fx lft arm       Current Outpatient Medications on File Prior to Visit   Medication Sig Dispense Refill    dapagliflozin (FARXIGA) 10 MG tablet Take 1 tablet by mouth every morning 90 tablet 3    vitamin B-12 (CYANOCOBALAMIN) 500 MCG tablet Take 2 tablets by mouth daily 90 tablet 3    metoprolol tartrate (LOPRESSOR) 50 MG tablet Take 1 tablet by mouth 2 times daily (Patient taking differently: Take 0.5 tablets by mouth 2 times daily) 180 tablet 1    glipiZIDE (GLUCOTROL) 5 MG tablet Take 1 tablet by mouth 2 times daily 180 tablet 1    lisinopril (PRINIVIL;ZESTRIL) 20 MG tablet Take 1 tablet by mouth daily 90 tablet 1    gabapentin (NEURONTIN) 300 MG capsule TAKE 1 CAPSULE BY MOUTH TWICE DAILY (MAX  DAILY  AMOUNT  600MG) 60 capsule 2    verapamil (CALAN SR) 180 MG extended release tablet Take 1 tablet by mouth nightly 90 tablet 1    pravastatin (PRAVACHOL) 40 MG tablet Take 1 tablet by mouth nightly 90 tablet 1    hydroCHLOROthiazide 12.5 MG capsule Take 1 capsule by mouth every morning 90 capsule 1    ezetimibe (ZETIA) 10 MG tablet Take 1 tablet by mouth daily 90 tablet 1    allopurinol (ZYLOPRIM) 300 MG tablet Take 1 tablet by mouth daily 90 tablet 1    Dulaglutide (TRULICITY) 4.5 MG/0.5ML SOPN INJECT 4.5MG (0.5ML) UNDER THE SKIN ONCE A WEEK 6 mL 3    acetaminophen (TYLENOL) 500 MG tablet Take 1 tablet by mouth every 6 hours as needed for Pain       No current facility-administered medications on file prior to visit.       Patient Active Problem List   Diagnosis    Hyperlipidemia    HTN (hypertension)    Lumbar compression fracture (HCC)    Severe obesity (BMI 35.0-39.9) with comorbidity (HCC)    Type 2 diabetes mellitus with stage 3a chronic kidney disease, without long-term current use of insulin (HCC)    Chronic renal disease, stage III (HCC)    Right hip pain

## 2025-07-31 LAB
ALBUMIN SERPL-MCNC: 3.9 G/DL (ref 3.5–5.2)
ALBUMIN/GLOB SERPL: 1.5 (ref 1.1–2.2)
ALP SERPL-CCNC: 81 U/L (ref 40–129)
ALT SERPL-CCNC: 33 U/L (ref 10–50)
ANION GAP SERPL CALC-SCNC: 14 MMOL/L (ref 2–14)
AST SERPL-CCNC: 22 U/L (ref 10–50)
BASOPHILS # BLD: 0.13 K/UL (ref 0–0.1)
BASOPHILS NFR BLD: 1.2 % (ref 0–1)
BILIRUB SERPL-MCNC: 0.3 MG/DL (ref 0–1.2)
BUN SERPL-MCNC: 29 MG/DL (ref 8–23)
BUN/CREAT SERPL: 21 (ref 12–20)
CALCIUM SERPL-MCNC: 10.1 MG/DL (ref 8.8–10.2)
CHLORIDE SERPL-SCNC: 103 MMOL/L (ref 98–107)
CO2 SERPL-SCNC: 22 MMOL/L (ref 20–29)
CREAT SERPL-MCNC: 1.35 MG/DL (ref 0.7–1.2)
DIFFERENTIAL METHOD BLD: ABNORMAL
EOSINOPHIL # BLD: 0.98 K/UL (ref 0–0.4)
EOSINOPHIL NFR BLD: 8.9 % (ref 0–7)
ERYTHROCYTE [DISTWIDTH] IN BLOOD BY AUTOMATED COUNT: 14.6 % (ref 11.5–14.5)
GLOBULIN SER CALC-MCNC: 2.5 G/DL (ref 2–4)
GLUCOSE SERPL-MCNC: 99 MG/DL (ref 65–100)
HCT VFR BLD AUTO: 48.5 % (ref 36.6–50.3)
HGB BLD-MCNC: 15.3 G/DL (ref 12.1–17)
IMM GRANULOCYTES # BLD AUTO: 0.03 K/UL (ref 0–0.04)
IMM GRANULOCYTES NFR BLD AUTO: 0.3 % (ref 0–0.5)
LYMPHOCYTES # BLD: 2.37 K/UL (ref 0.8–3.5)
LYMPHOCYTES NFR BLD: 21.4 % (ref 12–49)
MCH RBC QN AUTO: 30.4 PG (ref 26–34)
MCHC RBC AUTO-ENTMCNC: 31.5 G/DL (ref 30–36.5)
MCV RBC AUTO: 96.4 FL (ref 80–99)
MONOCYTES # BLD: 0.68 K/UL (ref 0–1)
MONOCYTES NFR BLD: 6.1 % (ref 5–13)
NEUTS SEG # BLD: 6.88 K/UL (ref 1.8–8)
NEUTS SEG NFR BLD: 62.1 % (ref 32–75)
NRBC # BLD: 0 K/UL (ref 0–0.01)
NRBC BLD-RTO: 0 PER 100 WBC
PLATELET # BLD AUTO: 377 K/UL (ref 150–400)
PMV BLD AUTO: 9.5 FL (ref 8.9–12.9)
POTASSIUM SERPL-SCNC: 4.7 MMOL/L (ref 3.5–5.1)
PROT SERPL-MCNC: 6.4 G/DL (ref 6.4–8.3)
RBC # BLD AUTO: 5.03 M/UL (ref 4.1–5.7)
SODIUM SERPL-SCNC: 139 MMOL/L (ref 136–145)
WBC # BLD AUTO: 11.1 K/UL (ref 4.1–11.1)

## 2025-08-01 ENCOUNTER — RESULTS FOLLOW-UP (OUTPATIENT)
Facility: CLINIC | Age: 70
End: 2025-08-01

## 2025-08-06 ENCOUNTER — HOSPITAL ENCOUNTER (OUTPATIENT)
Facility: HOSPITAL | Age: 70
Discharge: HOME | End: 2025-08-09
Attending: FAMILY MEDICINE
Payer: MEDICARE

## 2025-08-06 DIAGNOSIS — R10.84 GENERALIZED ABDOMINAL PAIN: ICD-10-CM

## 2025-08-06 PROCEDURE — 6360000004 HC RX CONTRAST MEDICATION: Performed by: FAMILY MEDICINE

## 2025-08-06 PROCEDURE — 76705 ECHO EXAM OF ABDOMEN: CPT

## 2025-08-06 PROCEDURE — 74177 CT ABD & PELVIS W/CONTRAST: CPT

## 2025-08-06 RX ORDER — IOPAMIDOL 755 MG/ML
100 INJECTION, SOLUTION INTRAVASCULAR
Status: COMPLETED | OUTPATIENT
Start: 2025-08-06 | End: 2025-08-06

## 2025-08-06 RX ADMIN — IOPAMIDOL 100 ML: 755 INJECTION, SOLUTION INTRAVENOUS at 09:23

## 2025-08-14 DIAGNOSIS — E11.22 TYPE 2 DIABETES MELLITUS WITH STAGE 3A CHRONIC KIDNEY DISEASE, WITHOUT LONG-TERM CURRENT USE OF INSULIN (HCC): ICD-10-CM

## 2025-08-14 DIAGNOSIS — N18.31 TYPE 2 DIABETES MELLITUS WITH STAGE 3A CHRONIC KIDNEY DISEASE, WITHOUT LONG-TERM CURRENT USE OF INSULIN (HCC): ICD-10-CM

## 2025-09-02 ENCOUNTER — OFFICE VISIT (OUTPATIENT)
Facility: CLINIC | Age: 70
End: 2025-09-02
Payer: MEDICARE

## 2025-09-02 VITALS
RESPIRATION RATE: 18 BRPM | OXYGEN SATURATION: 96 % | HEART RATE: 80 BPM | WEIGHT: 273 LBS | HEIGHT: 74 IN | TEMPERATURE: 98 F | DIASTOLIC BLOOD PRESSURE: 78 MMHG | SYSTOLIC BLOOD PRESSURE: 132 MMHG | BODY MASS INDEX: 35.04 KG/M2

## 2025-09-02 DIAGNOSIS — E11.22 TYPE 2 DIABETES MELLITUS WITH STAGE 3A CHRONIC KIDNEY DISEASE, WITHOUT LONG-TERM CURRENT USE OF INSULIN (HCC): Primary | ICD-10-CM

## 2025-09-02 DIAGNOSIS — K59.00 CONSTIPATION, UNSPECIFIED CONSTIPATION TYPE: ICD-10-CM

## 2025-09-02 DIAGNOSIS — M25.551 RIGHT HIP PAIN: ICD-10-CM

## 2025-09-02 DIAGNOSIS — B35.3 TINEA PEDIS OF LEFT FOOT: ICD-10-CM

## 2025-09-02 DIAGNOSIS — N18.31 TYPE 2 DIABETES MELLITUS WITH STAGE 3A CHRONIC KIDNEY DISEASE, WITHOUT LONG-TERM CURRENT USE OF INSULIN (HCC): Primary | ICD-10-CM

## 2025-09-02 DIAGNOSIS — M54.16 RADICULOPATHY, LUMBAR REGION: ICD-10-CM

## 2025-09-02 DIAGNOSIS — R10.11 RUQ PAIN: ICD-10-CM

## 2025-09-02 PROCEDURE — 99214 OFFICE O/P EST MOD 30 MIN: CPT | Performed by: FAMILY MEDICINE

## 2025-09-02 PROCEDURE — 3017F COLORECTAL CA SCREEN DOC REV: CPT | Performed by: FAMILY MEDICINE

## 2025-09-02 PROCEDURE — G8417 CALC BMI ABV UP PARAM F/U: HCPCS | Performed by: FAMILY MEDICINE

## 2025-09-02 PROCEDURE — G2211 COMPLEX E/M VISIT ADD ON: HCPCS | Performed by: FAMILY MEDICINE

## 2025-09-02 PROCEDURE — 2022F DILAT RTA XM EVC RTNOPTHY: CPT | Performed by: FAMILY MEDICINE

## 2025-09-02 PROCEDURE — 3075F SYST BP GE 130 - 139MM HG: CPT | Performed by: FAMILY MEDICINE

## 2025-09-02 PROCEDURE — 1160F RVW MEDS BY RX/DR IN RCRD: CPT | Performed by: FAMILY MEDICINE

## 2025-09-02 PROCEDURE — 1123F ACP DISCUSS/DSCN MKR DOCD: CPT | Performed by: FAMILY MEDICINE

## 2025-09-02 PROCEDURE — 1036F TOBACCO NON-USER: CPT | Performed by: FAMILY MEDICINE

## 2025-09-02 PROCEDURE — 3078F DIAST BP <80 MM HG: CPT | Performed by: FAMILY MEDICINE

## 2025-09-02 PROCEDURE — 1159F MED LIST DOCD IN RCRD: CPT | Performed by: FAMILY MEDICINE

## 2025-09-02 PROCEDURE — G8427 DOCREV CUR MEDS BY ELIG CLIN: HCPCS | Performed by: FAMILY MEDICINE

## 2025-09-02 PROCEDURE — 1125F AMNT PAIN NOTED PAIN PRSNT: CPT | Performed by: FAMILY MEDICINE

## 2025-09-02 PROCEDURE — 3051F HG A1C>EQUAL 7.0%<8.0%: CPT | Performed by: FAMILY MEDICINE

## 2025-09-02 RX ORDER — GABAPENTIN 300 MG/1
CAPSULE ORAL
Qty: 60 CAPSULE | Refills: 2 | Status: SHIPPED | OUTPATIENT
Start: 2025-09-02 | End: 2025-12-16

## 2025-09-02 RX ORDER — NYSTATIN 100000 U/G
CREAM TOPICAL
Qty: 30 G | Refills: 1 | Status: SHIPPED | OUTPATIENT
Start: 2025-09-02

## 2025-09-02 RX ORDER — GLIPIZIDE 2.5 MG/1
2.5 TABLET ORAL 2 TIMES DAILY
Qty: 180 TABLET | Refills: 0 | Status: SHIPPED | OUTPATIENT
Start: 2025-09-02

## 2025-09-02 ASSESSMENT — ENCOUNTER SYMPTOMS
COUGH: 0
CONSTIPATION: 1
DIARRHEA: 0
BACK PAIN: 1
ABDOMINAL PAIN: 1
WHEEZING: 0
SHORTNESS OF BREATH: 0

## (undated) DEVICE — BLADE,CARBON-STEEL,15,STRL,DISPOSABLE,TB: Brand: MEDLINE

## (undated) DEVICE — SOLUTION IRRIG 500ML 0.9% SOD CHLO USP POUR PLAS BTL

## (undated) DEVICE — GOWN,SIRUS,POLYRNF,BRTHSLV,XLN/XL,20/CS: Brand: MEDLINE

## (undated) DEVICE — 3M™ COBAN™ NL STERILE NON-LATEX SELF-ADHERENT WRAP, 2084S, 4 IN X 5 YD (10 CM X 4,5 M), 18 ROLLS/CASE: Brand: 3M™ COBAN™

## (undated) DEVICE — SUTURE CHROMIC GUT SZ 3-0 L27IN ABSRB BRN L26MM SH 1/2 CIR G122H

## (undated) DEVICE — SYRINGE MED 10ML LUERLOCK TIP W/O SFTY DISP

## (undated) DEVICE — MINOR GENERAL: Brand: MEDLINE INDUSTRIES, INC.

## (undated) DEVICE — DRESSING,GAUZE,XEROFORM,CURAD,1"X8",ST: Brand: CURAD

## (undated) DEVICE — SUTURE CHROMIC GUT SZ 4-0 L27IN ABSRB BRN L17MM RB-1 1/2 U203H

## (undated) DEVICE — GLOVE SURG SZ 75 L12IN FNGR THK79MIL GRN LTX FREE

## (undated) DEVICE — SYRINGE IRRIG 60ML SFT PLIABLE BLB EZ TO GRP 1 HND USE W/

## (undated) DEVICE — WET SKIN PREP TRAY: Brand: MEDLINE INDUSTRIES, INC.

## (undated) DEVICE — BANDAGE GZ W2XL75IN ST RAYON POLY CNFRM STRTCH LTWT

## (undated) DEVICE — BANDAGE COMPR COHESIVE 5 YDX4 IN SELF ADH TAN NS COBAN

## (undated) DEVICE — HYPODERMIC SAFETY NEEDLE: Brand: MONOJECT

## (undated) DEVICE — SPONGE GZ W4XL4IN COT 12 PLY TYP VII WVN C FLD DSGN STERILE

## (undated) DEVICE — BOWL MED M 16OZ PLAS CAP GRAD